# Patient Record
Sex: MALE | NOT HISPANIC OR LATINO | URBAN - METROPOLITAN AREA
[De-identification: names, ages, dates, MRNs, and addresses within clinical notes are randomized per-mention and may not be internally consistent; named-entity substitution may affect disease eponyms.]

---

## 2017-12-18 ENCOUNTER — INPATIENT (INPATIENT)
Facility: HOSPITAL | Age: 82
LOS: 3 days | Discharge: NURSING, REHAB OR LONG-TERM CARE | End: 2017-12-22
Attending: INTERNAL MEDICINE | Admitting: INTERNAL MEDICINE
Payer: MEDICARE

## 2017-12-18 VITALS
HEART RATE: 146 BPM | WEIGHT: 184.97 LBS | OXYGEN SATURATION: 95 % | SYSTOLIC BLOOD PRESSURE: 149 MMHG | HEIGHT: 70 IN | DIASTOLIC BLOOD PRESSURE: 75 MMHG | RESPIRATION RATE: 20 BRPM | TEMPERATURE: 99 F

## 2017-12-18 DIAGNOSIS — J15.9 UNSPECIFIED BACTERIAL PNEUMONIA: ICD-10-CM

## 2017-12-18 DIAGNOSIS — J18.9 PNEUMONIA, UNSPECIFIED ORGANISM: ICD-10-CM

## 2017-12-18 DIAGNOSIS — I10 ESSENTIAL (PRIMARY) HYPERTENSION: ICD-10-CM

## 2017-12-18 DIAGNOSIS — I48.0 PAROXYSMAL ATRIAL FIBRILLATION: ICD-10-CM

## 2017-12-18 DIAGNOSIS — E78.00 PURE HYPERCHOLESTEROLEMIA, UNSPECIFIED: ICD-10-CM

## 2017-12-18 DIAGNOSIS — Z98.41 CATARACT EXTRACTION STATUS, RIGHT EYE: Chronic | ICD-10-CM

## 2017-12-18 DIAGNOSIS — A41.9 SEPSIS, UNSPECIFIED ORGANISM: ICD-10-CM

## 2017-12-18 LAB
ALBUMIN SERPL ELPH-MCNC: 3 G/DL — LOW (ref 3.3–5)
ALP SERPL-CCNC: 90 U/L — SIGNIFICANT CHANGE UP (ref 40–120)
ALT FLD-CCNC: 16 U/L — SIGNIFICANT CHANGE UP (ref 12–78)
ANION GAP SERPL CALC-SCNC: 11 MMOL/L — SIGNIFICANT CHANGE UP (ref 5–17)
ANISOCYTOSIS BLD QL: SLIGHT — SIGNIFICANT CHANGE UP
APPEARANCE UR: CLEAR — SIGNIFICANT CHANGE UP
AST SERPL-CCNC: 21 U/L — SIGNIFICANT CHANGE UP (ref 15–37)
BACTERIA # UR AUTO: ABNORMAL
BILIRUB SERPL-MCNC: 3.2 MG/DL — HIGH (ref 0.2–1.2)
BILIRUB UR-MCNC: NEGATIVE — SIGNIFICANT CHANGE UP
BUN SERPL-MCNC: 19 MG/DL — SIGNIFICANT CHANGE UP (ref 7–23)
CALCIUM SERPL-MCNC: 8.4 MG/DL — LOW (ref 8.5–10.1)
CHLORIDE SERPL-SCNC: 97 MMOL/L — SIGNIFICANT CHANGE UP (ref 96–108)
CK MB BLD-MCNC: 2.7 % — SIGNIFICANT CHANGE UP (ref 0–3.5)
CK MB CFR SERPL CALC: 0.9 NG/ML — SIGNIFICANT CHANGE UP (ref 0.5–3.6)
CK SERPL-CCNC: 33 U/L — SIGNIFICANT CHANGE UP (ref 26–308)
CO2 SERPL-SCNC: 27 MMOL/L — SIGNIFICANT CHANGE UP (ref 22–31)
COLOR SPEC: YELLOW — SIGNIFICANT CHANGE UP
CREAT SERPL-MCNC: 1.33 MG/DL — HIGH (ref 0.5–1.3)
DIFF PNL FLD: ABNORMAL
FLUAV SPEC QL CULT: NEGATIVE — SIGNIFICANT CHANGE UP
FLUBV AG SPEC QL IA: NEGATIVE — SIGNIFICANT CHANGE UP
GIANT PLATELETS BLD QL SMEAR: PRESENT — SIGNIFICANT CHANGE UP
GLUCOSE SERPL-MCNC: 136 MG/DL — HIGH (ref 70–99)
GLUCOSE UR QL: NEGATIVE MG/DL — SIGNIFICANT CHANGE UP
HCT VFR BLD CALC: 46.7 % — SIGNIFICANT CHANGE UP (ref 39–50)
HGB BLD-MCNC: 15.2 G/DL — SIGNIFICANT CHANGE UP (ref 13–17)
HYALINE CASTS # UR AUTO: ABNORMAL /LPF
KETONES UR-MCNC: NEGATIVE — SIGNIFICANT CHANGE UP
LACTATE SERPL-SCNC: 1.8 MMOL/L — SIGNIFICANT CHANGE UP (ref 0.7–2)
LACTATE SERPL-SCNC: 2.9 MMOL/L — HIGH (ref 0.7–2)
LEUKOCYTE ESTERASE UR-ACNC: NEGATIVE — SIGNIFICANT CHANGE UP
LG PLATELETS BLD QL AUTO: SLIGHT — SIGNIFICANT CHANGE UP
MACROCYTES BLD QL: SLIGHT — SIGNIFICANT CHANGE UP
MCHC RBC-ENTMCNC: 30.5 PG — SIGNIFICANT CHANGE UP (ref 27–34)
MCHC RBC-ENTMCNC: 32.6 GM/DL — SIGNIFICANT CHANGE UP (ref 32–36)
MCV RBC AUTO: 93.6 FL — SIGNIFICANT CHANGE UP (ref 80–100)
MONOCYTES NFR BLD AUTO: 9 % — SIGNIFICANT CHANGE UP (ref 2–14)
NEUTROPHILS NFR BLD AUTO: 78 % — HIGH (ref 43–77)
NEUTS BAND # BLD: 1 % — SIGNIFICANT CHANGE UP (ref 0–8)
NITRITE UR-MCNC: NEGATIVE — SIGNIFICANT CHANGE UP
NT-PROBNP SERPL-SCNC: 2988 PG/ML — HIGH (ref 0–450)
PH UR: 6 — SIGNIFICANT CHANGE UP (ref 5–8)
PLAT MORPH BLD: NORMAL — SIGNIFICANT CHANGE UP
PLATELET # BLD AUTO: 263 K/UL — SIGNIFICANT CHANGE UP (ref 150–400)
POLYCHROMASIA BLD QL SMEAR: SLIGHT — SIGNIFICANT CHANGE UP
POTASSIUM SERPL-MCNC: 3.4 MMOL/L — LOW (ref 3.5–5.3)
POTASSIUM SERPL-SCNC: 3.4 MMOL/L — LOW (ref 3.5–5.3)
PROT SERPL-MCNC: 7.5 GM/DL — SIGNIFICANT CHANGE UP (ref 6–8.3)
PROT UR-MCNC: 15 MG/DL
RBC # BLD: 5 M/UL — SIGNIFICANT CHANGE UP (ref 4.2–5.8)
RBC # FLD: 12.1 % — SIGNIFICANT CHANGE UP (ref 11–15)
RBC BLD AUTO: ABNORMAL
RBC CASTS # UR COMP ASSIST: SIGNIFICANT CHANGE UP /HPF (ref 0–4)
SODIUM SERPL-SCNC: 135 MMOL/L — SIGNIFICANT CHANGE UP (ref 135–145)
SP GR SPEC: 1.01 — SIGNIFICANT CHANGE UP (ref 1.01–1.02)
TOXIC GRANULES BLD QL SMEAR: PRESENT — SIGNIFICANT CHANGE UP
TROPONIN I SERPL-MCNC: <.015 NG/ML — SIGNIFICANT CHANGE UP (ref 0.01–0.04)
UROBILINOGEN FLD QL: 1 MG/DL
VARIANT LYMPHS # BLD: 12 % — HIGH (ref 0–6)
WBC # BLD: 30.8 K/UL — HIGH (ref 3.8–10.5)
WBC # FLD AUTO: 30.8 K/UL — HIGH (ref 3.8–10.5)

## 2017-12-18 PROCEDURE — 99291 CRITICAL CARE FIRST HOUR: CPT

## 2017-12-18 PROCEDURE — 99223 1ST HOSP IP/OBS HIGH 75: CPT

## 2017-12-18 PROCEDURE — 93010 ELECTROCARDIOGRAM REPORT: CPT

## 2017-12-18 PROCEDURE — 71010: CPT | Mod: 26

## 2017-12-18 RX ORDER — SODIUM CHLORIDE 9 MG/ML
1000 INJECTION INTRAMUSCULAR; INTRAVENOUS; SUBCUTANEOUS ONCE
Qty: 0 | Refills: 0 | Status: COMPLETED | OUTPATIENT
Start: 2017-12-18 | End: 2017-12-18

## 2017-12-18 RX ORDER — PIPERACILLIN AND TAZOBACTAM 4; .5 G/20ML; G/20ML
3.38 INJECTION, POWDER, LYOPHILIZED, FOR SOLUTION INTRAVENOUS ONCE
Qty: 0 | Refills: 0 | Status: DISCONTINUED | OUTPATIENT
Start: 2017-12-18 | End: 2017-12-18

## 2017-12-18 RX ORDER — SODIUM CHLORIDE 9 MG/ML
3 INJECTION INTRAMUSCULAR; INTRAVENOUS; SUBCUTANEOUS ONCE
Qty: 0 | Refills: 0 | Status: COMPLETED | OUTPATIENT
Start: 2017-12-18 | End: 2017-12-18

## 2017-12-18 RX ORDER — ATORVASTATIN CALCIUM 80 MG/1
10 TABLET, FILM COATED ORAL AT BEDTIME
Qty: 0 | Refills: 0 | Status: DISCONTINUED | OUTPATIENT
Start: 2017-12-18 | End: 2017-12-22

## 2017-12-18 RX ORDER — VANCOMYCIN HCL 1 G
1000 VIAL (EA) INTRAVENOUS ONCE
Qty: 0 | Refills: 0 | Status: COMPLETED | OUTPATIENT
Start: 2017-12-18 | End: 2017-12-18

## 2017-12-18 RX ORDER — POTASSIUM CHLORIDE 20 MEQ
40 PACKET (EA) ORAL ONCE
Qty: 0 | Refills: 0 | Status: COMPLETED | OUTPATIENT
Start: 2017-12-18 | End: 2017-12-18

## 2017-12-18 RX ORDER — ALLOPURINOL 300 MG
100 TABLET ORAL
Qty: 0 | Refills: 0 | Status: DISCONTINUED | OUTPATIENT
Start: 2017-12-18 | End: 2017-12-22

## 2017-12-18 RX ORDER — OFLOXACIN 0.3 %
1 DROPS OPHTHALMIC (EYE) ONCE
Qty: 0 | Refills: 0 | Status: COMPLETED | OUTPATIENT
Start: 2017-12-18 | End: 2017-12-18

## 2017-12-18 RX ORDER — ACETAMINOPHEN 500 MG
650 TABLET ORAL EVERY 6 HOURS
Qty: 0 | Refills: 0 | Status: DISCONTINUED | OUTPATIENT
Start: 2017-12-18 | End: 2017-12-22

## 2017-12-18 RX ORDER — SODIUM CHLORIDE 9 MG/ML
2000 INJECTION INTRAMUSCULAR; INTRAVENOUS; SUBCUTANEOUS ONCE
Qty: 0 | Refills: 0 | Status: COMPLETED | OUTPATIENT
Start: 2017-12-18 | End: 2017-12-18

## 2017-12-18 RX ORDER — PIPERACILLIN AND TAZOBACTAM 4; .5 G/20ML; G/20ML
3.38 INJECTION, POWDER, LYOPHILIZED, FOR SOLUTION INTRAVENOUS EVERY 8 HOURS
Qty: 0 | Refills: 0 | Status: DISCONTINUED | OUTPATIENT
Start: 2017-12-18 | End: 2017-12-21

## 2017-12-18 RX ORDER — ACETAMINOPHEN 500 MG
975 TABLET ORAL ONCE
Qty: 0 | Refills: 0 | Status: COMPLETED | OUTPATIENT
Start: 2017-12-18 | End: 2017-12-18

## 2017-12-18 RX ORDER — OFLOXACIN 0.3 %
1 DROPS OPHTHALMIC (EYE) EVERY 4 HOURS
Qty: 0 | Refills: 0 | Status: COMPLETED | OUTPATIENT
Start: 2017-12-18 | End: 2017-12-19

## 2017-12-18 RX ORDER — PIPERACILLIN AND TAZOBACTAM 4; .5 G/20ML; G/20ML
3.38 INJECTION, POWDER, LYOPHILIZED, FOR SOLUTION INTRAVENOUS ONCE
Qty: 0 | Refills: 0 | Status: COMPLETED | OUTPATIENT
Start: 2017-12-18 | End: 2017-12-18

## 2017-12-18 RX ORDER — ENOXAPARIN SODIUM 100 MG/ML
30 INJECTION SUBCUTANEOUS EVERY 24 HOURS
Qty: 0 | Refills: 0 | Status: DISCONTINUED | OUTPATIENT
Start: 2017-12-18 | End: 2017-12-22

## 2017-12-18 RX ORDER — ATENOLOL 25 MG/1
50 TABLET ORAL DAILY
Qty: 0 | Refills: 0 | Status: DISCONTINUED | OUTPATIENT
Start: 2017-12-18 | End: 2017-12-22

## 2017-12-18 RX ADMIN — ENOXAPARIN SODIUM 30 MILLIGRAM(S): 100 INJECTION SUBCUTANEOUS at 17:25

## 2017-12-18 RX ADMIN — PIPERACILLIN AND TAZOBACTAM 100 GRAM(S): 4; .5 INJECTION, POWDER, LYOPHILIZED, FOR SOLUTION INTRAVENOUS at 08:11

## 2017-12-18 RX ADMIN — SODIUM CHLORIDE 500 MILLILITER(S): 9 INJECTION INTRAMUSCULAR; INTRAVENOUS; SUBCUTANEOUS at 09:07

## 2017-12-18 RX ADMIN — Medication 100 MILLIGRAM(S): at 17:33

## 2017-12-18 RX ADMIN — SODIUM CHLORIDE 3 MILLILITER(S): 9 INJECTION INTRAMUSCULAR; INTRAVENOUS; SUBCUTANEOUS at 08:58

## 2017-12-18 RX ADMIN — Medication 1 DROP(S): at 14:53

## 2017-12-18 RX ADMIN — ATORVASTATIN CALCIUM 10 MILLIGRAM(S): 80 TABLET, FILM COATED ORAL at 21:53

## 2017-12-18 RX ADMIN — Medication 1 DROP(S): at 21:54

## 2017-12-18 RX ADMIN — Medication 40 MILLIEQUIVALENT(S): at 10:07

## 2017-12-18 RX ADMIN — PIPERACILLIN AND TAZOBACTAM 12.5 GRAM(S): 4; .5 INJECTION, POWDER, LYOPHILIZED, FOR SOLUTION INTRAVENOUS at 14:53

## 2017-12-18 RX ADMIN — Medication 975 MILLIGRAM(S): at 08:11

## 2017-12-18 RX ADMIN — Medication 250 MILLIGRAM(S): at 08:42

## 2017-12-18 RX ADMIN — Medication 1 DROP(S): at 09:48

## 2017-12-18 RX ADMIN — Medication 1 DROP(S): at 09:05

## 2017-12-18 RX ADMIN — SODIUM CHLORIDE 2000 MILLILITER(S): 9 INJECTION INTRAMUSCULAR; INTRAVENOUS; SUBCUTANEOUS at 08:02

## 2017-12-18 RX ADMIN — PIPERACILLIN AND TAZOBACTAM 12.5 GRAM(S): 4; .5 INJECTION, POWDER, LYOPHILIZED, FOR SOLUTION INTRAVENOUS at 21:53

## 2017-12-18 RX ADMIN — Medication 1 DROP(S): at 17:26

## 2017-12-18 NOTE — H&P ADULT - NSHPLABSRESULTS_GEN_ALL_CORE
15.2   30.8  )-----------( 263      ( 18 Dec 2017 08:07 )             46.7   12-18    135  |  97  |  19  ----------------------------<  136<H>  3.4<L>   |  27  |  1.33<H>    Ca    8.4<L>      18 Dec 2017 08:07    TPro  7.5  /  Alb  3.0<L>  /  TBili  3.2<H>  /  DBili  x   /  AST  21  /  ALT  16  /  AlkPhos  90  12-18  < from: Xray Chest 1 View AP/PA. (12.18.17 @ 08:32) >    IMPRESSION:  Multifocal pneumonia in the right lung.    ekg- atrial fib?

## 2017-12-18 NOTE — ED PROVIDER NOTE - MEDICAL DECISION MAKING DETAILS
pt with sepsis and PNA R middle/lower lobe noted to admit for further care with Dr. Romero, Otherwise placed on isolation precautions due to rapidity of symptoms development. Placed DNR/DNI as per conversation with son who is surrogate/healthcare agent

## 2017-12-18 NOTE — H&P ADULT - NSHPREVIEWOFSYSTEMS_GEN_ALL_CORE
CONSTITUTIONAL fever, no weight loss, POSITIVE  fatigue  EYES: No eye pain, visual disturbances, or discharge  ENMT:  No difficulty hearing, tinnitus, vertigo; No sinus or throat pain  NECK: No pain or stiffness  RESPIRATORY:pos cough, no wheezing, chills or hemoptysis; mild shortness of breath  CARDIOVASCULAR: No chest pain, palpitations, dizziness, or leg swelling  GASTROINTESTINAL: No abdominal or epigastric pain. No nausea, vomiting, or hematemesis; No diarrhea or constipation. No melena or hematochezia.  GENITOURINARY: No dysuria, frequency, hematuria, or incontinence  NEUROLOGICAL: No headaches, memory loss, loss of strength, numbness, or tremors  SKIN: No itching, burning, rashes, or lesions   LYMPH NODES: No enlarged glands  ENDOCRINE: No heat or cold intolerance; No hair loss  MUSCULOSKELETAL: No joint pain or swelling; No muscle, back, or extremity pain  PSYCHIATRIC: No depression, anxiety, mood swings, or difficulty sleeping  HEME/LYMPH: No easy bruising, or bleeding gums  ALLERGY AND IMMUNOLOGIC: No hives or eczema

## 2017-12-18 NOTE — CONSULT NOTE ADULT - SUBJECTIVE AND OBJECTIVE BOX
[facility], Division of Infectious Diseases  BROOKS Bernardo A. Lee  [phone]  BENJIE CANDELARIO  89y, Male  [id number]    HPI:pt hard of hearing.  89 year old male with PMH of HTN, cataract Sx on R eye, otherwise presenting due to cough and SOB noted for past 2 days. Noted no fever at triage but after receiving patient to main ER noted rectal temp of 101,    States woke up and could not breath.  Has had some rhinorrhea recently, no sore throat, no dysuria, no diarrhea, no chest pain.  Denies sick contact.      PMH/PSH--  Elevated cholesterol  HTN (hypertension)  History of cataract surgery, right      Allergies--aspirin      Medications--  Antibiotics: piperacillin/tazobactam IVPB. 3.375 Gram(s) IV Intermittent every 8 hours    Immunologic:   Other: acetaminophen   Tablet PRN  allopurinol  ATENolol  Tablet  atorvastatin  enoxaparin Injectable  ofloxacin 0.3% Solution      Social History--  EtOH: denies ***  Tobacco: 70 years ago  Drug Use: denies ***  lives alone    Family/Marital History--    NC    Remainder not relevant to clinical concern.    Travel/Environmental/Occupational History:  retired  Review of Systems:  A >=10-point review of systems was obtained.     Pertinent positives and negatives--  Constitutional: No fevers. No Chills. No Rigors.   Eyes: ++ blurry vision + hard of hearing  ENMT: no sore throat  Cardiovascular: No chest pain. No palpitations.  Respiratory: ++ shortness of breath. ++ cough.  Gastrointestinal: No nausea or vomiting. No diarrhea or constipation.   Genitourinary: no dysuria  Musculoskeletal: no joint pain  Skin: no rash  Neurologic: no headache  Psychiatric: denies    Review of systems otherwise negative except as previously noted.    Physical Exam--  Vital Signs: T(F): 98.5 (12-18-17 @ 11:45), Max: 101.3 (12-18-17 @ 08:07)  HR: 89 (12-18-17 @ 11:45)  BP: 147/57 (12-18-17 @ 11:45)  RR: 21 (12-18-17 @ 11:45)  SpO2: 98% (12-18-17 @ 11:45)  Wt(kg): --  General: Nontoxic-appearing Male in no acute distress.  HEENT: AT/NC. Anicteric. Conjunctiva pink and moist. Oropharynx clear. teeth missin  Neck: Not rigid. No sense of mass.  Nodes: None palpable.  Lungs: Clear bilaterally without rales, wheezing or rhonchi  Heart: s1s2 rrr  Abdomen: Bowel sounds present and normoactive. Soft. Nondistended. Nontender  Back: No spinal tenderness. No costovertebral angle tenderness.   Extremities: No cyanosis or clubbing. trace edema.   Skin: Warm. Dry. Good turgor. No rash. No vasculitic stigmata.  Psychiatric: Appropriate affect and mood for situation.         Laboratory & Imaging Data--  CBC                        15.2   30.8  )-----------( 263      ( 18 Dec 2017 08:07 )             46.7       Chemistries  12-18    135  |  97  |  19  ----------------------------<  136<H>  3.4<L>   |  27  |  1.33<H>    Ca    8.4<L>      18 Dec 2017 08:07    TPro  7.5  /  Alb  3.0<L>  /  TBili  3.2<H>  /  DBili  x   /  AST  21  /  ALT  16  /  AlkPhos  90  12-18      Culture Data  Influenza Virus A and B Rapid Antigen (12.18.17 @ 08:20)    Influenza A Rapid Screen: Negative: Interpretation of Influenza virus Type A - Antigen Enzyme Immunoassay:  This test screens for Influenza A.  A negative result does not eliminate  the possibility of infection with influenza A.  Depending upon the type  and adequacy of the specimen collected, the specificity of the assay  ranges from % and the sensitivity from 20-70%.    Influenza B Rapid Screen: Negative: Interpretation of Influenza Virus Type B - Antigen Enzyme Immunoassay:  This test screens for Influenza B.  A negative result does not eliminate  the possibility of infection with influenza B. Depending upon the type  and adequacy of the specimen collected, the specificty of the assay  ranges from % and the sensitivity from 20-70%.        < from: Xray Chest 1 View AP/PA. (12.18.17 @ 08:32) >    EXAM:  CHEST SINGLE VIEW                            PROCEDURE DATE:  12/18/2017          INTERPRETATION:  cough    A frontal chest film demonstrates an infiltrate in the right upper lobe   in the anterior segment. There is also airspace disease in the right   lower lobe.    The heart size and vascular markings are within normal limits for   technique.      No mediastinal or hilar adenopathy is suggested. .     The osseous structures appear intact intact.     IMPRESSION:  Multifocal pneumonia inthe right lung.      < end of copied text >

## 2017-12-18 NOTE — ED PROVIDER NOTE - OBJECTIVE STATEMENT
89 year old male with PMH of HTN, cataract Sx on R eye, otherwise presenting due to cough and SOB noted for past 2 days. Noted no fever at triage but after receiving patient to main ER noted rectal temp of 101, sepsis protocol started on care of patient immediately. Pt otherwise states has not seen a doctor for a long time. Denies chest pain. Otherwise states no sick contacts and usually in good health, no recent hospitalizations.

## 2017-12-18 NOTE — ED PROVIDER NOTE - EYES, MLM
Left eye with conjunctival injection and crusting around eye, pupils equal, round and reactive to light.

## 2017-12-18 NOTE — ED PROVIDER NOTE - CRITICAL CARE PROVIDED
direct patient care (not related to procedure)/documentation/consultation with other physicians/interpretation of diagnostic studies/consult w/ pt's family directly relating to pts condition

## 2017-12-18 NOTE — H&P ADULT - NSHPPHYSICALEXAM_GEN_ALL_CORE
GENERAL: NAD well-developed  HEAD:  Atraumatic, Normocephalic  EYES: EOMI, PERRLA, conjunctiva and sclera clear  ENMT: No tonsillar erythema, exudates, or enlargement; Moist mucous membranes, Good dentition, No lesions  NECK: Supple, No JVD, Normal thyroid  NERVOUS SYSTEM:  Alert & Oriented X3, Good concentration; Motor Strength 5/5 B/L upper and lower extremities; DTRs 2+ intact and symmetric  CHEST/LUNG:  rales at base no rhonchi, wheezing, or rubs  HEART: Regular rate and rhythm; No murmurs, rubs, or gallops  ABDOMEN: Soft, Nontender, Nondistended; Bowel sounds present  EXTREMITIES:  2+ Peripheral Pulses, No clubbing, cyanosis, or edema  LYMPH: No lymphadenopathy   SKIN: No rashes or lesions

## 2017-12-18 NOTE — CONSULT NOTE ADULT - PROBLEM SELECTOR RECOMMENDATION 9
f/u blood cx  rvp concern for bacterial superimposed on viral pna  urine for legionella  not recently hospitalized   maybe concern for aspiration in this 89 yr old  given vancomycin times 1,   change zosyn to unasyn  add azithromycin until legionella is back  follow cbc and fever curve

## 2017-12-19 DIAGNOSIS — Z29.9 ENCOUNTER FOR PROPHYLACTIC MEASURES, UNSPECIFIED: ICD-10-CM

## 2017-12-19 DIAGNOSIS — R53.1 WEAKNESS: ICD-10-CM

## 2017-12-19 LAB
ANION GAP SERPL CALC-SCNC: 8 MMOL/L — SIGNIFICANT CHANGE UP (ref 5–17)
BUN SERPL-MCNC: 19 MG/DL — SIGNIFICANT CHANGE UP (ref 7–23)
CALCIUM SERPL-MCNC: 7.7 MG/DL — LOW (ref 8.5–10.1)
CHLORIDE SERPL-SCNC: 103 MMOL/L — SIGNIFICANT CHANGE UP (ref 96–108)
CO2 SERPL-SCNC: 27 MMOL/L — SIGNIFICANT CHANGE UP (ref 22–31)
CREAT SERPL-MCNC: 0.93 MG/DL — SIGNIFICANT CHANGE UP (ref 0.5–1.3)
CULTURE RESULTS: NO GROWTH — SIGNIFICANT CHANGE UP
GLUCOSE SERPL-MCNC: 111 MG/DL — HIGH (ref 70–99)
HCT VFR BLD CALC: 36.9 % — LOW (ref 39–50)
HGB BLD-MCNC: 12.6 G/DL — LOW (ref 13–17)
LEGIONELLA AG UR QL: NEGATIVE — SIGNIFICANT CHANGE UP
MCHC RBC-ENTMCNC: 31.8 PG — SIGNIFICANT CHANGE UP (ref 27–34)
MCHC RBC-ENTMCNC: 34 GM/DL — SIGNIFICANT CHANGE UP (ref 32–36)
MCV RBC AUTO: 93.6 FL — SIGNIFICANT CHANGE UP (ref 80–100)
PLATELET # BLD AUTO: 205 K/UL — SIGNIFICANT CHANGE UP (ref 150–400)
POTASSIUM SERPL-MCNC: 3.8 MMOL/L — SIGNIFICANT CHANGE UP (ref 3.5–5.3)
POTASSIUM SERPL-SCNC: 3.8 MMOL/L — SIGNIFICANT CHANGE UP (ref 3.5–5.3)
RBC # BLD: 3.94 M/UL — LOW (ref 4.2–5.8)
RBC # FLD: 12.5 % — SIGNIFICANT CHANGE UP (ref 11–15)
SODIUM SERPL-SCNC: 138 MMOL/L — SIGNIFICANT CHANGE UP (ref 135–145)
SPECIMEN SOURCE: SIGNIFICANT CHANGE UP
WBC # BLD: 22.3 K/UL — HIGH (ref 3.8–10.5)
WBC # FLD AUTO: 22.3 K/UL — HIGH (ref 3.8–10.5)

## 2017-12-19 PROCEDURE — 99233 SBSQ HOSP IP/OBS HIGH 50: CPT

## 2017-12-19 RX ORDER — IPRATROPIUM/ALBUTEROL SULFATE 18-103MCG
3 AEROSOL WITH ADAPTER (GRAM) INHALATION EVERY 6 HOURS
Qty: 0 | Refills: 0 | Status: DISCONTINUED | OUTPATIENT
Start: 2017-12-19 | End: 2017-12-22

## 2017-12-19 RX ORDER — ASPIRIN/CALCIUM CARB/MAGNESIUM 324 MG
325 TABLET ORAL DAILY
Qty: 0 | Refills: 0 | Status: DISCONTINUED | OUTPATIENT
Start: 2017-12-19 | End: 2017-12-22

## 2017-12-19 RX ORDER — INFLUENZA VIRUS VACCINE 15; 15; 15; 15 UG/.5ML; UG/.5ML; UG/.5ML; UG/.5ML
0.5 SUSPENSION INTRAMUSCULAR ONCE
Qty: 0 | Refills: 0 | Status: DISCONTINUED | OUTPATIENT
Start: 2017-12-19 | End: 2017-12-22

## 2017-12-19 RX ORDER — IPRATROPIUM/ALBUTEROL SULFATE 18-103MCG
3 AEROSOL WITH ADAPTER (GRAM) INHALATION ONCE
Qty: 0 | Refills: 0 | Status: COMPLETED | OUTPATIENT
Start: 2017-12-19 | End: 2017-12-19

## 2017-12-19 RX ADMIN — Medication 1 DROP(S): at 10:20

## 2017-12-19 RX ADMIN — Medication 100 MILLIGRAM(S): at 17:54

## 2017-12-19 RX ADMIN — Medication 1 DROP(S): at 13:58

## 2017-12-19 RX ADMIN — Medication 1 DROP(S): at 02:17

## 2017-12-19 RX ADMIN — Medication 100 MILLIGRAM(S): at 10:20

## 2017-12-19 RX ADMIN — Medication 3 MILLILITER(S): at 17:05

## 2017-12-19 RX ADMIN — ATORVASTATIN CALCIUM 10 MILLIGRAM(S): 80 TABLET, FILM COATED ORAL at 21:22

## 2017-12-19 RX ADMIN — ENOXAPARIN SODIUM 30 MILLIGRAM(S): 100 INJECTION SUBCUTANEOUS at 17:54

## 2017-12-19 RX ADMIN — PIPERACILLIN AND TAZOBACTAM 12.5 GRAM(S): 4; .5 INJECTION, POWDER, LYOPHILIZED, FOR SOLUTION INTRAVENOUS at 05:45

## 2017-12-19 RX ADMIN — Medication 1 DROP(S): at 05:44

## 2017-12-19 RX ADMIN — PIPERACILLIN AND TAZOBACTAM 12.5 GRAM(S): 4; .5 INJECTION, POWDER, LYOPHILIZED, FOR SOLUTION INTRAVENOUS at 13:57

## 2017-12-19 RX ADMIN — Medication 3 MILLILITER(S): at 11:02

## 2017-12-19 RX ADMIN — PIPERACILLIN AND TAZOBACTAM 12.5 GRAM(S): 4; .5 INJECTION, POWDER, LYOPHILIZED, FOR SOLUTION INTRAVENOUS at 21:25

## 2017-12-19 RX ADMIN — Medication 3 MILLILITER(S): at 03:26

## 2017-12-19 RX ADMIN — ATENOLOL 50 MILLIGRAM(S): 25 TABLET ORAL at 05:44

## 2017-12-19 NOTE — PROGRESS NOTE ADULT - PROBLEM SELECTOR PLAN 1
Would continue IV Zosyn, patient seem sot still be significantly compromised by PNA and will require continued inpatient care.  Will follow results of urine legionella but patient improving on current Rx.

## 2017-12-19 NOTE — PROGRESS NOTE ADULT - SUBJECTIVE AND OBJECTIVE BOX
Patient is a 89y old  Male who presents with a chief complaint of     INTERVAL HPI/ OVERNIGHT EVENTS: Pt was seen and examined at bedside today, No significant overnight events, pt admits to feeling better, still has cough, denies CP and SOB     MEDICATIONS  (STANDING):  ALBUTerol/ipratropium for Nebulization 3 milliLiter(s) Nebulizer every 6 hours  allopurinol 100 milliGRAM(s) Oral two times a day after meals  aspirin 325 milliGRAM(s) Oral daily  ATENolol  Tablet 50 milliGRAM(s) Oral daily  atorvastatin 10 milliGRAM(s) Oral at bedtime  enoxaparin Injectable 30 milliGRAM(s) SubCutaneous every 24 hours  influenza   Vaccine 0.5 milliLiter(s) IntraMuscular once  piperacillin/tazobactam IVPB. 3.375 Gram(s) IV Intermittent every 8 hours    MEDICATIONS  (PRN):  acetaminophen   Tablet 650 milliGRAM(s) Oral every 6 hours PRN For Temp greater than 38 C (100.4 F)      Allergies    aspirin (Stomach Upset)    Intolerances        REVIEW OF SYSTEMS:    Unable to examine due to [ ] Altered Mental Status [ ] Advanced Dementia [ ] Expressive Aphasia [ ] Non-verbal patient    CONSTITUTIONAL: No fever, + generalized weakness/Fatigue, No weight loss  EYES: No eye pain, visual disturbances, or discharge  ENMT:  No difficulty hearing, tinnitus, vertigo; No sinus or throat pain  NECK: No pain or stiffness  RESPIRATORY: +cough w/ cream sputum, No shortness of breath,  wheezing, chills or hemoptysis   CARDIOVASCULAR: No chest pain, palpitations, or leg swelling  GASTROINTESTINAL: No abdominal pain. No nausea, vomiting, diarrhea or constipation. No melena or hematochezia.  GENITOURINARY: No dysuria, frequency, hematuria, or incontinence  NEUROLOGICAL: No headaches, Dizziness, memory loss, loss of strength, numbness, or tremors  SKIN: No itching, burning, rashes, or lesions   MUSCULOSKELETAL: No joint pain or swelling; No muscle, back, or extremity pain  PSYCHIATRIC: No depression, anxiety, mood swings, or difficulty sleeping  HEME/LYMPH: No easy bruising, or bleeding gums  ALLERGY AND IMMUNOLOGIC: No hives or eczema    Vital Signs Last 24 Hrs  T(C): 36.9 (19 Dec 2017 17:33), Max: 37 (19 Dec 2017 00:41)  T(F): 98.4 (19 Dec 2017 17:33), Max: 98.6 (19 Dec 2017 00:41)  HR: 75 (19 Dec 2017 17:45) (74 - 111)  BP: 126/75 (19 Dec 2017 17:45) (126/75 - 145/74)  BP(mean): --  RR: 18 (19 Dec 2017 17:33) (18 - 20)  SpO2: 95% (19 Dec 2017 17:45) (95% - 98%)    PHYSICAL EXAM:  GENERAL: NAD, well-groomed, well-developed, heard of hearing  HEAD:  Atraumatic, Normocephalic  EYES: EOMI, PERRLA, conjunctiva and sclera clear  ENMT: Moist mucous membranes  NECK: Supple, No JVD, Normal thyroid  CHEST/LUNG: Clear to Auscultation bilaterally; No rales, rhonchi, wheezing, or rubs  HEART: Regular rate and rhythm; No murmurs, rubs, or gallops  ABDOMEN: Soft, Nontender, Nondistended; Bowel sounds present  EXTREMITIES:  2+ Peripheral Pulses, No clubbing, cyanosis, or edema  LYMPH: No lymphadenopathy noted  SKIN: No rashes or lesions  NERVOUS SYSTEM:  Alert & Oriented X3, Good concentration; Motor Strength 5/5 B/L upper and lower extremities    LABS:                        12.6   22.3  )-----------( 205      ( 19 Dec 2017 06:37 )             36.9     12-19    138  |  103  |  19  ----------------------------<  111<H>  3.8   |  27  |  0.93    Ca    7.7<L>      19 Dec 2017 06:37    TPro  7.5  /  Alb  3.0<L>  /  TBili  3.2<H>  /  DBili  x   /  AST  21  /  ALT  16  /  AlkPhos  90  12-18      Urinalysis Basic - ( 18 Dec 2017 11:26 )    Color: Yellow / Appearance: Clear / S.010 / pH: x  Gluc: x / Ketone: Negative  / Bili: Negative / Urobili: 1 mg/dL   Blood: x / Protein: 15 mg/dL / Nitrite: Negative   Leuk Esterase: Negative / RBC: 0-2 /HPF / WBC x   Sq Epi: x / Non Sq Epi: x / Bacteria: Occasional      CAPILLARY BLOOD GLUCOSE            Culture - Urine (collected 17)  Source: .Urine Clean Catch (Midstream)  Final Report (17):    No growth    Culture - Blood (collected 17)  Source: .Blood Blood  Preliminary Report (17):    No growth to date.    Culture - Blood (collected 17)  Source: .Blood Blood  Preliminary Report (17):    No growth to date.        RADIOLOGY & ADDITIONAL TESTS:    < from: Xray Chest 1 View AP/PA. (17 @ 08:32) >  IMPRESSION:  Multifocal pneumonia inthe right lung.    < end of copied text >          Imaging Personally Reviewed:  [x ] YES  [ ] NO    Consultant(s) Notes Reviewed:  [x ] YES  [ ] NO    Care Discussed with Consultants/Other Providers [ x YES  [ ] NO

## 2017-12-19 NOTE — PHYSICAL THERAPY INITIAL EVALUATION ADULT - PERTINENT HX OF CURRENT PROBLEM, REHAB EVAL
Patient came to ED with increasing dyspnea, related to cough. Chart reviewed and noted – hematology: downtrending but raised WBC, low abut WNL H+H, chemistry: raised glucose (downtrending; no HbA1C), unrinalysis: no evident infection;virology: ruled out influenza or RVP; imaging: Chest XRay showing clear lung fileds s signs of consolidation, effusion, edema, or atelectasis multifocal PNA on right UL. Impression: PNA with impaired balance and strength.

## 2017-12-19 NOTE — PHYSICAL THERAPY INITIAL EVALUATION ADULT - MODIFIED CLINICAL TEST OF SENSORY INTEGRATION IN BALANCE TEST
Barthel Index: Feeding Score _10__, Bathing Score _0__, Grooming Score _5__, Dressing Score _5__, Bowels Score _0__, Bladder Score _5__, Toilet Score _0__, Transfers Score _10__, Mobility Score _0__, Stairs Score _0__,     Total Score __35_

## 2017-12-19 NOTE — PHYSICAL THERAPY INITIAL EVALUATION ADULT - GENERAL OBSERVATIONS, REHAB EVAL
Patient supine in bed c stable vital signs. Cardiac monitor in place. AAOx4. Hard of hearing (usually with hearing aides); visually impaired due to cataracts. Patient denies pain or shortness of breath at rest. Chest Auscultation: crackles and expiratory wheezes on right anterior and posterior chest. Palpation: no tactile fremitus, symmetric expansion. Cough: strong, effective.

## 2017-12-19 NOTE — PROGRESS NOTE ADULT - PROBLEM SELECTOR PLAN 1
Sepsis POA resolved now, Right lung, presumed Gram - bacteria, influenza neg, as seen on CXR, afebrile, ++leukocytosis, ID consult appreciated, will continue with IV broad spectrum abx.

## 2017-12-19 NOTE — PHYSICAL THERAPY INITIAL EVALUATION ADULT - PRECAUTIONS/LIMITATIONS, REHAB EVAL
per EMR has conjunctivitis; per son by bedside later in session, has cataracts; uses hearing aides/cardiac precautions/fall precautions/hearing precautions/vision precautions

## 2017-12-19 NOTE — PHYSICAL THERAPY INITIAL EVALUATION ADULT - IMPAIRMENTS FOUND, PT EVAL
ROM/sensory integrity/poor safety awareness/ventilation and respiration/gas exchange/neuromotor development and sensory integration/visual motor/gait, locomotion, and balance/integumentary integrity/joint integrity and mobility/muscle strength/aerobic capacity/endurance/cranial and peripheral nerve integrity

## 2017-12-19 NOTE — PROGRESS NOTE ADULT - SUBJECTIVE AND OBJECTIVE BOX
infectious diseases progress note:    BENJIE CANDELARIO is a 89y y. o. Male patient    Patient reports: breathing attack this morning    ROS:    EYES:  Negative  blurry vision or double vision  GASTROINTESTINAL:  Negative for nausea, vomiting, diarrhea  -otherwise negative except for subjective    Allergies    aspirin (Stomach Upset)    Intolerances        ANTIBIOTICS/RELEVANT:  antimicrobials  piperacillin/tazobactam IVPB. 3.375 Gram(s) IV Intermittent every 8 hours    immunologic:    OTHER:  acetaminophen   Tablet 650 milliGRAM(s) Oral every 6 hours PRN  ALBUTerol/ipratropium for Nebulization 3 milliLiter(s) Nebulizer every 6 hours  ALBUTerol/ipratropium for Nebulization. 3 milliLiter(s) Nebulizer once  allopurinol 100 milliGRAM(s) Oral two times a day after meals  ATENolol  Tablet 50 milliGRAM(s) Oral daily  atorvastatin 10 milliGRAM(s) Oral at bedtime  enoxaparin Injectable 30 milliGRAM(s) SubCutaneous every 24 hours  ofloxacin 0.3% Solution 1 Drop(s) Left EYE every 4 hours      Objective:  Last 24-Vital Signs Last 24 Hrs  T(C): 37 (19 Dec 2017 00:41), Max: 37 (19 Dec 2017 00:41)  T(F): 98.6 (19 Dec 2017 00:41), Max: 98.6 (19 Dec 2017 00:41)  HR: 111 (19 Dec 2017 03:29) (86 - 116)  BP: 136/64 (19 Dec 2017 00:41) (136/64 - 147/74)  BP(mean): --  RR: 20 (19 Dec 2017 00:41) (18 - 21)  SpO2: 97% (19 Dec 2017 03:29) (96% - 98%)    T(C): 37 (17 @ 00:41), Max: 38.5 (17 @ 08:07)  T(F): 98.6 (17 @ 00:41), Max: 101.3 (17 @ 08:07)  T(C): 37 (17 @ 00:41), Max: 38.5 (17 @ 08:07)  T(F): 98.6 (17 @ 00:41), Max: 101.3 (17 @ 08:07)  T(C): 37 (17 @ 00:41), Max: 38.5 (17 @ 08:07)  T(F): 98.6 (17 @ 00:41), Max: 101.3 (17 @ 08:07)    PHYSICAL EXAM:  Constitutional:Well-developed, well nourished  Eyes:PERRLA, EOMI  Ear/Nose/Throat: oropharynx normal	  Neck:no JVD, no lymphadenopathy, supple  Respiratory: no accessory muscle use, lung fields with significant ronchi and crackles mostly on the right  Cardiovascular:RRR, normal S1, S2 no m/r/g  Gastrointestinal:soft, NT, no HSM, BS-normal  Extremities:no clubbing, no cyanosis, edema absent  Neuro-patient alert, oriented and appropriate  Skin-no sig lesions      LABS:                        12.6   22.3  )-----------( 205      ( 19 Dec 2017 06:37 )             36.9       WBC 22.3   @ 06:37  WBC 30.8   @ 08:07          138  |  103  |  19  ----------------------------<  111<H>  3.8   |  27  |  0.93    Ca    7.7<L>      19 Dec 2017 06:37    TPro  7.5  /  Alb  3.0<L>  /  TBili  3.2<H>  /  DBili  x   /  AST  21  /  ALT  16  /  AlkPhos  90        Urinalysis Basic - ( 18 Dec 2017 11:26 )    Color: Yellow / Appearance: Clear / S.010 / pH: x  Gluc: x / Ketone: Negative  / Bili: Negative / Urobili: 1 mg/dL   Blood: x / Protein: 15 mg/dL / Nitrite: Negative   Leuk Esterase: Negative / RBC: 0-2 /HPF / WBC x   Sq Epi: x / Non Sq Epi: x / Bacteria: Occasional          MICROBIOLOGY:        RADIOLOGY & ADDITIONAL STUDIES:

## 2017-12-19 NOTE — PHYSICAL THERAPY INITIAL EVALUATION ADULT - PLANNED THERAPY INTERVENTIONS, PT EVAL
gait training/postural re-education/strengthening/neuromuscular re-education/ROM/transfer training/balance training/stretching/bed mobility training

## 2017-12-19 NOTE — PHYSICAL THERAPY INITIAL EVALUATION ADULT - CRITERIA FOR SKILLED THERAPEUTIC INTERVENTIONS
functional limitations in following categories/anticipated discharge recommendation/impairments found/risk reduction/prevention/rehab potential

## 2017-12-19 NOTE — PHYSICAL THERAPY INITIAL EVALUATION ADULT - ADDITIONAL COMMENTS
As per patient, lives alone in private house c 3 stair steps to enter c handrails. Only dwells now on house's main level, where living area was converted to his bedroom and bathroom is about 10 feet from bed. Aleks[facility] home health aide. Does not use a cane he has at home to ambulate prior to admission.

## 2017-12-20 LAB
ANION GAP SERPL CALC-SCNC: 9 MMOL/L — SIGNIFICANT CHANGE UP (ref 5–17)
BUN SERPL-MCNC: 20 MG/DL — SIGNIFICANT CHANGE UP (ref 7–23)
CALCIUM SERPL-MCNC: 7.6 MG/DL — LOW (ref 8.5–10.1)
CHLORIDE SERPL-SCNC: 104 MMOL/L — SIGNIFICANT CHANGE UP (ref 96–108)
CO2 SERPL-SCNC: 27 MMOL/L — SIGNIFICANT CHANGE UP (ref 22–31)
CREAT SERPL-MCNC: 0.9 MG/DL — SIGNIFICANT CHANGE UP (ref 0.5–1.3)
GLUCOSE SERPL-MCNC: 91 MG/DL — SIGNIFICANT CHANGE UP (ref 70–99)
HCT VFR BLD CALC: 36.6 % — LOW (ref 39–50)
HGB BLD-MCNC: 12.3 G/DL — LOW (ref 13–17)
MCHC RBC-ENTMCNC: 31.4 PG — SIGNIFICANT CHANGE UP (ref 27–34)
MCHC RBC-ENTMCNC: 33.7 GM/DL — SIGNIFICANT CHANGE UP (ref 32–36)
MCV RBC AUTO: 93.1 FL — SIGNIFICANT CHANGE UP (ref 80–100)
PLATELET # BLD AUTO: 216 K/UL — SIGNIFICANT CHANGE UP (ref 150–400)
POTASSIUM SERPL-MCNC: 3.6 MMOL/L — SIGNIFICANT CHANGE UP (ref 3.5–5.3)
POTASSIUM SERPL-SCNC: 3.6 MMOL/L — SIGNIFICANT CHANGE UP (ref 3.5–5.3)
RBC # BLD: 3.93 M/UL — LOW (ref 4.2–5.8)
RBC # FLD: 11.9 % — SIGNIFICANT CHANGE UP (ref 11–15)
SODIUM SERPL-SCNC: 140 MMOL/L — SIGNIFICANT CHANGE UP (ref 135–145)
TSH SERPL-MCNC: 1.05 UU/ML — SIGNIFICANT CHANGE UP (ref 0.36–3.74)
WBC # BLD: 17.9 K/UL — HIGH (ref 3.8–10.5)
WBC # FLD AUTO: 17.9 K/UL — HIGH (ref 3.8–10.5)

## 2017-12-20 PROCEDURE — 93010 ELECTROCARDIOGRAM REPORT: CPT

## 2017-12-20 PROCEDURE — 99233 SBSQ HOSP IP/OBS HIGH 50: CPT

## 2017-12-20 RX ADMIN — Medication 3 MILLILITER(S): at 17:11

## 2017-12-20 RX ADMIN — Medication 325 MILLIGRAM(S): at 11:41

## 2017-12-20 RX ADMIN — Medication 3 MILLILITER(S): at 11:21

## 2017-12-20 RX ADMIN — Medication 3 MILLILITER(S): at 23:49

## 2017-12-20 RX ADMIN — Medication 100 MILLIGRAM(S): at 16:38

## 2017-12-20 RX ADMIN — PIPERACILLIN AND TAZOBACTAM 12.5 GRAM(S): 4; .5 INJECTION, POWDER, LYOPHILIZED, FOR SOLUTION INTRAVENOUS at 21:55

## 2017-12-20 RX ADMIN — ATENOLOL 50 MILLIGRAM(S): 25 TABLET ORAL at 05:57

## 2017-12-20 RX ADMIN — Medication 100 MILLIGRAM(S): at 09:15

## 2017-12-20 RX ADMIN — ATORVASTATIN CALCIUM 10 MILLIGRAM(S): 80 TABLET, FILM COATED ORAL at 21:55

## 2017-12-20 RX ADMIN — ENOXAPARIN SODIUM 30 MILLIGRAM(S): 100 INJECTION SUBCUTANEOUS at 16:38

## 2017-12-20 RX ADMIN — PIPERACILLIN AND TAZOBACTAM 12.5 GRAM(S): 4; .5 INJECTION, POWDER, LYOPHILIZED, FOR SOLUTION INTRAVENOUS at 05:57

## 2017-12-20 RX ADMIN — PIPERACILLIN AND TAZOBACTAM 12.5 GRAM(S): 4; .5 INJECTION, POWDER, LYOPHILIZED, FOR SOLUTION INTRAVENOUS at 13:42

## 2017-12-20 NOTE — PROGRESS NOTE ADULT - PROBLEM SELECTOR PLAN 1
Sepsis POA resolved now, Right lung, presumed Gram - bacteria, influenza neg, as seen on CXR, afebrile, improving leukocytosis, ID on board, will continue with IV broad spectrum abx.

## 2017-12-20 NOTE — PROGRESS NOTE ADULT - SUBJECTIVE AND OBJECTIVE BOX
Patient is a 89y old  Male who presents with a chief complaint of     INTERVAL HPI/ OVERNIGHT EVENTS: Pt was seen and examined at bedside today, No significant overnight events, His breathing and cough continues to feel better, pt still feels a little weak     MEDICATIONS  (STANDING):  ALBUTerol/ipratropium for Nebulization 3 milliLiter(s) Nebulizer every 6 hours  allopurinol 100 milliGRAM(s) Oral two times a day after meals  aspirin 325 milliGRAM(s) Oral daily  ATENolol  Tablet 50 milliGRAM(s) Oral daily  atorvastatin 10 milliGRAM(s) Oral at bedtime  enoxaparin Injectable 30 milliGRAM(s) SubCutaneous every 24 hours  influenza   Vaccine 0.5 milliLiter(s) IntraMuscular once  piperacillin/tazobactam IVPB. 3.375 Gram(s) IV Intermittent every 8 hours    MEDICATIONS  (PRN):  acetaminophen   Tablet 650 milliGRAM(s) Oral every 6 hours PRN For Temp greater than 38 C (100.4 F)      Allergies    aspirin (Stomach Upset)    Intolerances        REVIEW OF SYSTEMS:    Unable to examine due to [ ] Altered Mental Status [ ] Advanced Dementia [ ] Expressive Aphasia [ ] Non-verbal patient    CONSTITUTIONAL: No fever, + generalized weakness/Fatigue, No weight loss  EYES: No eye pain, visual disturbances, or discharge  ENMT:  No difficulty hearing, tinnitus, vertigo; No sinus or throat pain  NECK: No pain or stiffness  RESPIRATORY: +cough, No shortness of breath, wheezing, chills or hemoptysis   CARDIOVASCULAR: No chest pain, palpitations, or leg swelling  GASTROINTESTINAL: No abdominal pain. No nausea, vomiting, diarrhea or constipation. No melena or hematochezia.  GENITOURINARY: No dysuria, frequency, hematuria, or incontinence  NEUROLOGICAL: No headaches, Dizziness, memory loss, loss of strength, numbness, or tremors  SKIN: No itching, burning, rashes, or lesions   MUSCULOSKELETAL: right heel pain, No joint pain or swelling; No muscle, back, or extremity pain  PSYCHIATRIC: No depression, anxiety, mood swings, or difficulty sleeping  HEME/LYMPH: No easy bruising, or bleeding gums  ALLERGY AND IMMUNOLOGIC: No hives or eczema    Vital Signs Last 24 Hrs  T(C): 36.9 (20 Dec 2017 18:43), Max: 37 (20 Dec 2017 05:29)  T(F): 98.4 (20 Dec 2017 18:43), Max: 98.6 (20 Dec 2017 05:29)  HR: 96 (20 Dec 2017 18:43) (86 - 103)  BP: 132/67 (20 Dec 2017 18:43) (114/71 - 135/61)  BP(mean): --  RR: 20 (20 Dec 2017 18:43) (20 - 20)  SpO2: 96% (20 Dec 2017 17:33) (93% - 97%)    PHYSICAL EXAM:  GENERAL: NAD, well-groomed, well-developed, heard of hearing  HEAD:  Atraumatic, Normocephalic  EYES: EOMI, PERRLA, conjunctiva and sclera clear  ENMT: Moist mucous membranes  NECK: Supple, No JVD, Normal thyroid  CHEST/LUNG: Clear to Auscultation bilaterally; No rales, rhonchi, wheezing, or rubs  HEART: Regular rate and rhythm; No murmurs, rubs, or gallops  ABDOMEN: Soft, Nontender, Nondistended; Bowel sounds present  EXTREMITIES: trace le edema,  2+ Peripheral Pulses, No clubbing, cyanosis  LYMPH: No lymphadenopathy noted  SKIN: No rashes or lesions  NERVOUS SYSTEM:  Alert & Oriented X3, Good concentration; Motor Strength 5/5 B/L upper and lower extremities    LABS:                        12.3   17.9  )-----------( 216      ( 20 Dec 2017 06:27 )             36.6     12-20    140  |  104  |  20  ----------------------------<  91  3.6   |  27  |  0.90    Ca    7.6<L>      20 Dec 2017 06:27          CAPILLARY BLOOD GLUCOSE            Culture - Urine (collected 12-18-17)  Source: .Urine Clean Catch (Midstream)  Final Report (12-19-17):    No growth    Culture - Blood (collected 12-18-17)  Source: .Blood Blood  Preliminary Report (12-19-17):    No growth to date.    Culture - Blood (collected 12-18-17)  Source: .Blood Blood  Preliminary Report (12-19-17):    No growth to date.        RADIOLOGY & ADDITIONAL TESTS:          Imaging Personally Reviewed:  [ ] YES  [ ] NO    Consultant(s) Notes Reviewed:  [x ] YES  [ ] NO    Care Discussed with Consultants/Other Providers [x ] YES  [ ] NO

## 2017-12-21 DIAGNOSIS — R06.09 OTHER FORMS OF DYSPNEA: ICD-10-CM

## 2017-12-21 PROCEDURE — 99233 SBSQ HOSP IP/OBS HIGH 50: CPT

## 2017-12-21 RX ORDER — AMPICILLIN SODIUM AND SULBACTAM SODIUM 250; 125 MG/ML; MG/ML
1.5 INJECTION, POWDER, FOR SUSPENSION INTRAMUSCULAR; INTRAVENOUS ONCE
Qty: 0 | Refills: 0 | Status: COMPLETED | OUTPATIENT
Start: 2017-12-21 | End: 2017-12-21

## 2017-12-21 RX ORDER — AMPICILLIN SODIUM AND SULBACTAM SODIUM 250; 125 MG/ML; MG/ML
1.5 INJECTION, POWDER, FOR SUSPENSION INTRAMUSCULAR; INTRAVENOUS EVERY 6 HOURS
Qty: 0 | Refills: 0 | Status: DISCONTINUED | OUTPATIENT
Start: 2017-12-22 | End: 2017-12-22

## 2017-12-21 RX ORDER — PIPERACILLIN AND TAZOBACTAM 4; .5 G/20ML; G/20ML
3.38 INJECTION, POWDER, LYOPHILIZED, FOR SOLUTION INTRAVENOUS EVERY 8 HOURS
Qty: 0 | Refills: 0 | Status: DISCONTINUED | OUTPATIENT
Start: 2017-12-21 | End: 2017-12-21

## 2017-12-21 RX ORDER — AMPICILLIN SODIUM AND SULBACTAM SODIUM 250; 125 MG/ML; MG/ML
INJECTION, POWDER, FOR SUSPENSION INTRAMUSCULAR; INTRAVENOUS
Qty: 0 | Refills: 0 | Status: DISCONTINUED | OUTPATIENT
Start: 2017-12-21 | End: 2017-12-22

## 2017-12-21 RX ADMIN — Medication 3 MILLILITER(S): at 17:21

## 2017-12-21 RX ADMIN — Medication 3 MILLILITER(S): at 23:57

## 2017-12-21 RX ADMIN — AMPICILLIN SODIUM AND SULBACTAM SODIUM 100 GRAM(S): 250; 125 INJECTION, POWDER, FOR SUSPENSION INTRAMUSCULAR; INTRAVENOUS at 21:23

## 2017-12-21 RX ADMIN — Medication 1 TABLET(S): at 17:27

## 2017-12-21 RX ADMIN — Medication 3 MILLILITER(S): at 11:40

## 2017-12-21 RX ADMIN — Medication 3 MILLILITER(S): at 05:29

## 2017-12-21 RX ADMIN — PIPERACILLIN AND TAZOBACTAM 12.5 GRAM(S): 4; .5 INJECTION, POWDER, LYOPHILIZED, FOR SOLUTION INTRAVENOUS at 05:37

## 2017-12-21 RX ADMIN — Medication 100 MILLIGRAM(S): at 09:57

## 2017-12-21 RX ADMIN — Medication 100 MILLIGRAM(S): at 18:02

## 2017-12-21 RX ADMIN — ATENOLOL 50 MILLIGRAM(S): 25 TABLET ORAL at 05:37

## 2017-12-21 RX ADMIN — ENOXAPARIN SODIUM 30 MILLIGRAM(S): 100 INJECTION SUBCUTANEOUS at 17:26

## 2017-12-21 RX ADMIN — Medication 325 MILLIGRAM(S): at 12:19

## 2017-12-21 NOTE — PROGRESS NOTE ADULT - PROBLEM SELECTOR PLAN 2
continue home meds Sepsis POA resolved now, Right lung, presumed Gram - bacteria, influenza neg, as seen on CXR, afebrile, improving leukocytosis, ID note appreciated, will f/u chest CT, continue with IV broad spectrum abx.

## 2017-12-21 NOTE — PROGRESS NOTE ADULT - PROBLEM SELECTOR PLAN 1
Sepsis POA resolved now, Right lung, presumed Gram - bacteria, influenza neg, as seen on CXR, afebrile, improving leukocytosis, ID on board, will continue with IV broad spectrum abx. cont O2 supplement, duo-nebs prn, f/u CT chest, pulmonary consult

## 2017-12-21 NOTE — PROGRESS NOTE ADULT - SUBJECTIVE AND OBJECTIVE BOX
Patient is a 89y old  Male who presents with a chief complaint of     INTERVAL HPI/ OVERNIGHT EVENTS: Pt was seen and examined at bedside today, No significant overnight events     MEDICATIONS  (STANDING):  ALBUTerol/ipratropium for Nebulization 3 milliLiter(s) Nebulizer every 6 hours  allopurinol 100 milliGRAM(s) Oral two times a day after meals  amoxicillin  875 milliGRAM(s)/clavulanate 1 Tablet(s) Oral two times a day  aspirin 325 milliGRAM(s) Oral daily  ATENolol  Tablet 50 milliGRAM(s) Oral daily  atorvastatin 10 milliGRAM(s) Oral at bedtime  enoxaparin Injectable 30 milliGRAM(s) SubCutaneous every 24 hours  influenza   Vaccine 0.5 milliLiter(s) IntraMuscular once    MEDICATIONS  (PRN):  acetaminophen   Tablet 650 milliGRAM(s) Oral every 6 hours PRN For Temp greater than 38 C (100.4 F)      Allergies    aspirin (Stomach Upset)    Intolerances        REVIEW OF SYSTEMS:    Unable to examine due to [ ] Altered Mental Status [ ] Advanced Dementia [ ] Expressive Aphasia [ ] Non-verbal patient    CONSTITUTIONAL: No fever, NO generalized weakness/Fatigue, No weight loss  EYES: No eye pain, visual disturbances, or discharge  ENMT:  No difficulty hearing, tinnitus, vertigo; No sinus or throat pain  NECK: No pain or stiffness  RESPIRATORY: No shortness of breath,  cough, wheezing, chills or hemoptysis   CARDIOVASCULAR: No chest pain, palpitations, or leg swelling  GASTROINTESTINAL: No abdominal pain. No nausea, vomiting, diarrhea or constipation. No melena or hematochezia.  GENITOURINARY: No dysuria, frequency, hematuria, or incontinence  NEUROLOGICAL: No headaches, Dizziness, memory loss, loss of strength, numbness, or tremors  SKIN: No itching, burning, rashes, or lesions   MUSCULOSKELETAL: No joint pain or swelling; No muscle, back, or extremity pain  PSYCHIATRIC: No depression, anxiety, mood swings, or difficulty sleeping  HEME/LYMPH: No easy bruising, or bleeding gums  ALLERGY AND IMMUNOLOGIC: No hives or eczema    Vital Signs Last 24 Hrs  T(C): 36.4 (21 Dec 2017 18:50), Max: 37.3 (21 Dec 2017 00:56)  T(F): 97.6 (21 Dec 2017 18:50), Max: 99.2 (21 Dec 2017 00:56)  HR: 78 (21 Dec 2017 18:50) (78 - 107)  BP: 144/69 (21 Dec 2017 18:50) (107/80 - 144/69)  BP(mean): --  RR: 18 (21 Dec 2017 18:50) (16 - 18)  SpO2: 98% (21 Dec 2017 18:50) (95% - 99%)    PHYSICAL EXAM:  GENERAL: NAD, well-groomed, well-developed  HEAD:  Atraumatic, Normocephalic  EYES: EOMI, PERRLA, conjunctiva and sclera clear  ENMT: Moist mucous membranes  NECK: Supple, No JVD, Normal thyroid  CHEST/LUNG: Clear to Auscultation bilaterally; No rales, rhonchi, wheezing, or rubs  HEART: Regular rate and rhythm; No murmurs, rubs, or gallops  ABDOMEN: Soft, Nontender, Nondistended; Bowel sounds present  EXTREMITIES:  2+ Peripheral Pulses, No clubbing, cyanosis, or edema  LYMPH: No lymphadenopathy noted  SKIN: No rashes or lesions  NERVOUS SYSTEM:  Alert & Oriented X3, Good concentration; Motor Strength 5/5 B/L upper and lower extremities    LABS:                        12.3   17.9  )-----------( 216      ( 20 Dec 2017 06:27 )             36.6     12-20    140  |  104  |  20  ----------------------------<  91  3.6   |  27  |  0.90    Ca    7.6<L>      20 Dec 2017 06:27          CAPILLARY BLOOD GLUCOSE            Culture - Urine (collected 12-18-17)  Source: .Urine Clean Catch (Midstream)  Final Report (12-19-17):    No growth    Culture - Blood (collected 12-18-17)  Source: .Blood Blood  Preliminary Report (12-19-17):    No growth to date.    Culture - Blood (collected 12-18-17)  Source: .Blood Blood  Preliminary Report (12-19-17):    No growth to date.        RADIOLOGY & ADDITIONAL TESTS:          Imaging Personally Reviewed:  [ ] YES  [ ] NO    Consultant(s) Notes Reviewed:  [ ] YES  [ ] NO    Care Discussed with Consultants/Other Providers [ ] YES  [ ] NO Patient is a 89y old  Male who presents with a chief complaint of     INTERVAL HPI/ OVERNIGHT EVENTS: Pt was seen and examined at bedside today, No significant overnight events, pt states that he is feeling better, +cough, admits to SOB with exertion, no cp      MEDICATIONS  (STANDING):  ALBUTerol/ipratropium for Nebulization 3 milliLiter(s) Nebulizer every 6 hours  allopurinol 100 milliGRAM(s) Oral two times a day after meals  amoxicillin  875 milliGRAM(s)/clavulanate 1 Tablet(s) Oral two times a day  aspirin 325 milliGRAM(s) Oral daily  ATENolol  Tablet 50 milliGRAM(s) Oral daily  atorvastatin 10 milliGRAM(s) Oral at bedtime  enoxaparin Injectable 30 milliGRAM(s) SubCutaneous every 24 hours  influenza   Vaccine 0.5 milliLiter(s) IntraMuscular once    MEDICATIONS  (PRN):  acetaminophen   Tablet 650 milliGRAM(s) Oral every 6 hours PRN For Temp greater than 38 C (100.4 F)      Allergies    aspirin (Stomach Upset)    Intolerances        REVIEW OF SYSTEMS:    Unable to examine due to [ ] Altered Mental Status [ ] Advanced Dementia [ ] Expressive Aphasia [ ] Non-verbal patient    CONSTITUTIONAL: No fever, + generalized weakness/Fatigue, No weight loss  EYES: No eye pain, visual disturbances, or discharge  ENMT:  No difficulty hearing, tinnitus, vertigo; No sinus or throat pain  NECK: No pain or stiffness  RESPIRATORY: + shortness of breath, + cough, no wheezing, chills or hemoptysis   CARDIOVASCULAR: No chest pain, palpitations, or leg swelling  GASTROINTESTINAL: No abdominal pain. No nausea, vomiting, diarrhea or constipation. No melena or hematochezia.  GENITOURINARY: No dysuria, frequency, hematuria, or incontinence  NEUROLOGICAL: No headaches, Dizziness, memory loss, loss of strength, numbness, or tremors  SKIN: No itching, burning, rashes, or lesions   MUSCULOSKELETAL: No joint pain or swelling; No muscle, back, or extremity pain  PSYCHIATRIC: No depression, anxiety, mood swings, or difficulty sleeping  HEME/LYMPH: No easy bruising, or bleeding gums  ALLERGY AND IMMUNOLOGIC: No hives or eczema    Vital Signs Last 24 Hrs  T(C): 36.4 (21 Dec 2017 18:50), Max: 37.3 (21 Dec 2017 00:56)  T(F): 97.6 (21 Dec 2017 18:50), Max: 99.2 (21 Dec 2017 00:56)  HR: 78 (21 Dec 2017 18:50) (78 - 107)  BP: 144/69 (21 Dec 2017 18:50) (107/80 - 144/69)  BP(mean): --  RR: 18 (21 Dec 2017 18:50) (16 - 18)  SpO2: 98% (21 Dec 2017 18:50) (95% - 99%)    PHYSICAL EXAM:  GENERAL: NAD, but pt looks SOB when speaking, well-groomed, well-developed  HEAD:  Atraumatic, Normocephalic  EYES: EOMI, PERRLA, conjunctiva and sclera clear  ENMT: Moist mucous membranes  NECK: Supple, No JVD, Normal thyroid  CHEST/LUNG: Clear to Auscultation bilaterally; distant breath sounds, No rales, rhonchi, wheezing, or rubs  HEART: Regular rate and rhythm; No murmurs, rubs, or gallops  ABDOMEN: Soft, Nontender, Nondistended; Bowel sounds present  EXTREMITIES:  2+ Peripheral Pulses, No clubbing, cyanosis, or edema  LYMPH: No lymphadenopathy noted  SKIN: No rashes or lesions  NERVOUS SYSTEM:  Alert & Oriented X3, Good concentration; Motor Strength 5/5 B/L upper and lower extremities    LABS:                        12.3   17.9  )-----------( 216      ( 20 Dec 2017 06:27 )             36.6     12-20    140  |  104  |  20  ----------------------------<  91  3.6   |  27  |  0.90    Ca    7.6<L>      20 Dec 2017 06:27          CAPILLARY BLOOD GLUCOSE            Culture - Urine (collected 12-18-17)  Source: .Urine Clean Catch (Midstream)  Final Report (12-19-17):    No growth    Culture - Blood (collected 12-18-17)  Source: .Blood Blood  Preliminary Report (12-19-17):    No growth to date.    Culture - Blood (collected 12-18-17)  Source: .Blood Blood  Preliminary Report (12-19-17):    No growth to date.        RADIOLOGY & ADDITIONAL TESTS:          Imaging Personally Reviewed:  [ ] YES  [ ] NO    Consultant(s) Notes Reviewed:  [x ] YES  [ ] NO    Care Discussed with Consultants/Other Providers [ x] YES  [ ] NO

## 2017-12-21 NOTE — PROGRESS NOTE ADULT - SUBJECTIVE AND OBJECTIVE BOX
[facility], Division of Infectious Diseases  BROOKS Bernardo A. Lee  [phone]  Name: BENJIE CANDELARIO  Age: 89y  Gender: Male  MRN: [id number]    Interval History--  Notes reviewed  less cough today.  smiling      Past Medical History--  Elevated cholesterol  HTN (hypertension)  History of cataract surgery, right      For details regarding the patient's social history, family history, and other miscellaneous elements, please refer the initial infectious diseases consultation and/or the admitting history and physical examination for this admission.    Allergies    aspirin (Stomach Upset)    Intolerances        Medications--  Antibiotics:  amoxicillin  875 milliGRAM(s)/clavulanate 1 Tablet(s) Oral two times a day    Immunologic:  influenza   Vaccine 0.5 milliLiter(s) IntraMuscular once    Other:  acetaminophen   Tablet PRN  ALBUTerol/ipratropium for Nebulization  allopurinol  aspirin  ATENolol  Tablet  atorvastatin  enoxaparin Injectable      Review of Systems--  A 10-point review of systems was obtained.     Pertinent positives and negatives--  Constitutional: No fevers. No Chills. No Rigors.   Cardiovascular: No chest pain. No palpitations.  Respiratory: No shortness of breath. No cough.  Gastrointestinal: No nausea or vomiting. No diarrhea or constipation.   Psychiatric: Pleasant.     Review of systems otherwise negative except as previously noted.    Physical Examination--  Vital Signs: T(F): 98.5 (12-21-17 @ 12:24), Max: 99.2 (12-21-17 @ 00:56)  HR: 80 (12-21-17 @ 12:24)  BP: 138/64 (12-21-17 @ 12:24)  RR: 18 (12-21-17 @ 12:24)  SpO2: 98% (12-21-17 @ 12:24)  Wt(kg): --  General: Nontoxic-appearing Male in no acute distress.  HEENT: AT/NC.  Anicteric. Conjunctiva pink and moist. Oropharynx clear. Dentition fair.  Neck: Not rigid. No sense of mass.  Nodes: None palpable.  Lungs: decreased bs  Heart: Regular rate and rhythm. No Murmur. No rub. No gallop. No palpable thrill.  Abdomen: Bowel sounds present and normoactive. Soft. Nondistended. Nontender.   Extremities: No cyanosis or clubbing. trace edema.   Skin: Warm. Dry. Good turgor. No rash. No vasculitic stigmata.  Psychiatric: Appropriate affect and mood for situation.         Laboratory Studies--  CBC                        12.3   17.9  )-----------( 216      ( 20 Dec 2017 06:27 )             36.6       Chemistries  12-20    140  |  104  |  20  ----------------------------<  91  3.6   |  27  |  0.90    Ca    7.6<L>      20 Dec 2017 06:27        Culture Data    Culture - Urine (12.18.17 @ 15:02)    Specimen Source: .Urine Clean Catch (Midstream)    Culture Results:   No growth        Rapid Respiratory Viral Panel (12.18.17 @ 09:48)    Rapid RVP Result: NotDetec: This Respiratory Panel uses polymerase chain reaction (PCR) to detect for  adenovirus; coronavirus (HKU1, NL63, 229E, OC43); human metapneumovirus  (hMPV); human enterovirus/rhinovirus (Entero/RV); influenza A; influenza  A/H1; influenza A/H3; influenza A/H1-2009; influenza B; parainfluenza  viruses 1, 2, 3, 4; respiratory syncytial virus; Mycoplasma pneumoniae;  and Chlamydophila pneumoniae.      Legionella pneumophila Antigen, Urine (12.18.17 @ 14:34)    Legionella Antigen, Urine: Negative

## 2017-12-21 NOTE — PROGRESS NOTE ADULT - PROBLEM SELECTOR PLAN 1
Would continue IV unasyn,   CT scan chest given still with leukocytosis  patient seem slightly improved by PNA but still with some dsypnea  urine legionella negative.

## 2017-12-22 ENCOUNTER — TRANSCRIPTION ENCOUNTER (OUTPATIENT)
Age: 82
End: 2017-12-22

## 2017-12-22 VITALS — OXYGEN SATURATION: 97 %

## 2017-12-22 LAB
ANION GAP SERPL CALC-SCNC: 8 MMOL/L — SIGNIFICANT CHANGE UP (ref 5–17)
BUN SERPL-MCNC: 16 MG/DL — SIGNIFICANT CHANGE UP (ref 7–23)
CALCIUM SERPL-MCNC: 7.8 MG/DL — LOW (ref 8.5–10.1)
CHLORIDE SERPL-SCNC: 103 MMOL/L — SIGNIFICANT CHANGE UP (ref 96–108)
CO2 SERPL-SCNC: 30 MMOL/L — SIGNIFICANT CHANGE UP (ref 22–31)
CREAT SERPL-MCNC: 0.84 MG/DL — SIGNIFICANT CHANGE UP (ref 0.5–1.3)
GLUCOSE SERPL-MCNC: 93 MG/DL — SIGNIFICANT CHANGE UP (ref 70–99)
HCT VFR BLD CALC: 34.9 % — LOW (ref 39–50)
HGB BLD-MCNC: 12 G/DL — LOW (ref 13–17)
MCHC RBC-ENTMCNC: 32.2 PG — SIGNIFICANT CHANGE UP (ref 27–34)
MCHC RBC-ENTMCNC: 34.3 GM/DL — SIGNIFICANT CHANGE UP (ref 32–36)
MCV RBC AUTO: 93.9 FL — SIGNIFICANT CHANGE UP (ref 80–100)
PLATELET # BLD AUTO: 240 K/UL — SIGNIFICANT CHANGE UP (ref 150–400)
POTASSIUM SERPL-MCNC: 3.6 MMOL/L — SIGNIFICANT CHANGE UP (ref 3.5–5.3)
POTASSIUM SERPL-SCNC: 3.6 MMOL/L — SIGNIFICANT CHANGE UP (ref 3.5–5.3)
RBC # BLD: 3.72 M/UL — LOW (ref 4.2–5.8)
RBC # FLD: 11.6 % — SIGNIFICANT CHANGE UP (ref 11–15)
SODIUM SERPL-SCNC: 141 MMOL/L — SIGNIFICANT CHANGE UP (ref 135–145)
WBC # BLD: 13.1 K/UL — HIGH (ref 3.8–10.5)
WBC # FLD AUTO: 13.1 K/UL — HIGH (ref 3.8–10.5)

## 2017-12-22 PROCEDURE — 99238 HOSP IP/OBS DSCHRG MGMT 30/<: CPT

## 2017-12-22 PROCEDURE — 71250 CT THORAX DX C-: CPT | Mod: 26

## 2017-12-22 RX ORDER — ASPIRIN/CALCIUM CARB/MAGNESIUM 324 MG
1 TABLET ORAL
Qty: 0 | Refills: 0 | COMMUNITY
Start: 2017-12-22

## 2017-12-22 RX ADMIN — Medication 3 MILLILITER(S): at 12:03

## 2017-12-22 RX ADMIN — AMPICILLIN SODIUM AND SULBACTAM SODIUM 100 GRAM(S): 250; 125 INJECTION, POWDER, FOR SUSPENSION INTRAMUSCULAR; INTRAVENOUS at 03:56

## 2017-12-22 RX ADMIN — Medication 100 MILLIGRAM(S): at 17:09

## 2017-12-22 RX ADMIN — Medication 325 MILLIGRAM(S): at 09:35

## 2017-12-22 RX ADMIN — Medication 3 MILLILITER(S): at 05:53

## 2017-12-22 RX ADMIN — Medication 1 TABLET(S): at 17:08

## 2017-12-22 RX ADMIN — ENOXAPARIN SODIUM 30 MILLIGRAM(S): 100 INJECTION SUBCUTANEOUS at 17:08

## 2017-12-22 RX ADMIN — ATENOLOL 50 MILLIGRAM(S): 25 TABLET ORAL at 05:09

## 2017-12-22 RX ADMIN — AMPICILLIN SODIUM AND SULBACTAM SODIUM 100 GRAM(S): 250; 125 INJECTION, POWDER, FOR SUSPENSION INTRAMUSCULAR; INTRAVENOUS at 09:35

## 2017-12-22 RX ADMIN — Medication 100 MILLIGRAM(S): at 09:34

## 2017-12-22 NOTE — DISCHARGE NOTE ADULT - HOSPITAL COURSE
89 year old male with PMH of HTN, cataract Sx on R eye, otherwise presenting due to cough and SOB noted for past 2 days. Noted no fever at triage but after receiving patient to main ER noted rectal temp of 101, sepsis protocol started on care of patient immediately. Pt otherwise states has not seen a doctor for a long time. Denies chest pain. Otherwise states no sick contacts and usually in good health, no recent hospitalizations. Patient admitted to telemetry and started on IV antibiotics. 89 year old male with PMH of HTN, cataract Sx on R eye, otherwise presenting due to cough and SOB noted for past 2 days. Noted no fever at triage but after receiving patient to main ER noted rectal temp of 101, sepsis protocol started on care of patient immediately. Pt otherwise states has not seen a doctor for a long time. Denies chest pain. Otherwise states no sick contacts and usually in good health, no recent hospitalizations. Patient admitted to telemetry and started on IV antibiotics. Pt stable for discharge to [facility] w/ PO ABX.

## 2017-12-22 NOTE — PROGRESS NOTE ADULT - SUBJECTIVE AND OBJECTIVE BOX
infectious diseases progress note:    BENJIE CANDELARIO is a 89y y. o. Male patient    Patient reports: feeling improved    ROS:    EYES:  Negative  blurry vision or double vision  GASTROINTESTINAL:  Negative for nausea, vomiting, diarrhea  -otherwise negative except for subjective    Allergies    aspirin (Stomach Upset)    Intolerances        ANTIBIOTICS/RELEVANT:  antimicrobials  ampicillin/sulbactam  IVPB 1.5 Gram(s) IV Intermittent every 6 hours  ampicillin/sulbactam  IVPB        immunologic:  influenza   Vaccine 0.5 milliLiter(s) IntraMuscular once    OTHER:  acetaminophen   Tablet 650 milliGRAM(s) Oral every 6 hours PRN  ALBUTerol/ipratropium for Nebulization 3 milliLiter(s) Nebulizer every 6 hours  allopurinol 100 milliGRAM(s) Oral two times a day after meals  aspirin 325 milliGRAM(s) Oral daily  ATENolol  Tablet 50 milliGRAM(s) Oral daily  atorvastatin 10 milliGRAM(s) Oral at bedtime  enoxaparin Injectable 30 milliGRAM(s) SubCutaneous every 24 hours      Objective:  Last 24-Vital Signs Last 24 Hrs  T(C): 36.9 (22 Dec 2017 11:44), Max: 37.6 (21 Dec 2017 23:33)  T(F): 98.4 (22 Dec 2017 11:44), Max: 99.7 (21 Dec 2017 23:33)  HR: 66 (22 Dec 2017 12:04) (66 - 107)  BP: 132/82 (22 Dec 2017 11:44) (132/82 - 153/80)  BP(mean): --  RR: 18 (22 Dec 2017 11:44) (18 - 20)  SpO2: 97% (22 Dec 2017 12:04) (96% - 98%)    T(C): 36.9 (12-22-17 @ 11:44), Max: 37.6 (12-21-17 @ 23:33)  T(F): 98.4 (12-22-17 @ 11:44), Max: 99.7 (12-21-17 @ 23:33)  T(C): 36.9 (12-22-17 @ 11:44), Max: 37.6 (12-21-17 @ 23:33)  T(F): 98.4 (12-22-17 @ 11:44), Max: 99.7 (12-21-17 @ 23:33)  T(C): 36.9 (12-22-17 @ 11:44), Max: 37.6 (12-21-17 @ 23:33)  T(F): 98.4 (12-22-17 @ 11:44), Max: 99.7 (12-21-17 @ 23:33)    PHYSICAL EXAM:  Constitutional:Well-developed, well nourished  Eyes:PERRLA, EOMI, erythema left eye  Ear/Nose/Throat: oropharynx normal	  Neck:no JVD, no lymphadenopathy, supple  Respiratory: no accessory muscle use, lung fields with some ronchi on right  Cardiovascular:RRR, normal S1, S2 no m/r/g  Gastrointestinal:soft, NT, no HSM, BS-normal  Extremities:no clubbing, no cyanosis, edema absent  Neuro-patient alert, oriented and appropriate  Skin-no sig lesions      LABS:                        12.0   13.1  )-----------( 240      ( 22 Dec 2017 06:53 )             34.9       WBC 13.1  12-22 @ 06:53  WBC 17.9  12-20 @ 06:27  WBC 22.3  12-19 @ 06:37  WBC 30.8  12-18 @ 08:07      12-22    141  |  103  |  16  ----------------------------<  93  3.6   |  30  |  0.84    Ca    7.8<L>      22 Dec 2017 06:53              MICROBIOLOGY:        RADIOLOGY & ADDITIONAL STUDIES:    < from: CT Chest No Cont (12.22.17 @ 11:00) >    EXAM:  CT CHEST                            PROCEDURE DATE:  12/22/2017          INTERPRETATION:  CLINICAL INFORMATION: Dyspnea and cough    COMPARISON: None.    PROCEDURE:   CT of the Chest was performed without intravenous contrast.  Sagittal and coronal reformats were performed.      FINDINGS:    CHEST:     LUNGS AND LARGE AIRWAYS: Patent central airways.  Emphysema. Right upper   and lower lobe opacities. Lower lobe atelectasis.  PLEURA: Small bilateral pleural effusions, right larger than left.  VESSELS: Atherosclerotic changes of thoracic aorta and coronary arteries.  HEART: Heart size is normal. Trace pericardial effusion.  MEDIASTINUM AND ROBBIE: Several prominent mediastinal lymph nodes.  CHEST WALL AND LOWER NECK: Within normal limits.  VISUALIZED UPPER ABDOMEN: Moderate hiatal hernia and partially   intrathoracic stomach. Cholelithiasis. Nonspecific perinephric stranding.   Atrophic pancreas.  BONES: Shoulder and spine degenerative changes.    IMPRESSION:     Emphysema.  Right upper and lower lobe opacities, compatible with multifocal   pneumonia.  Small bilateral pleural effusions, right larger than left.

## 2017-12-22 NOTE — DISCHARGE NOTE ADULT - PLAN OF CARE
Continue augmentin 825mg BID PO discharge to rehab with po antibiotics until 12/28/17 rate controlled. on aspirin 325mg improved leukocytosis. Continue po augmentin in rehab continue home medications discharge to rehab Continue augmentin 875mg BID PO

## 2017-12-22 NOTE — PROGRESS NOTE ADULT - ASSESSMENT
89M with HTN admitted for dyspnea sepsis secondary right sided pneumonia
89m with history of cough with multifocal pneumonia     IMPROVE VTE Individual Risk Assessment          RISK                                                          Points    [  ] Previous VTE                                                3    [  ] Thrombophilia                                             2    [  ] Lower limb paralysis                                    2        (unable to hold up >15 seconds)      [  ] Current Cancer                                             2         (within 6 months)    [x  ] Immobilization > 24 hrs                              1    [  ] ICU/CCU stay > 24 hours                            1    [ x ] Age > 60                                                    1    IMPROVE VTE Score ___2____    1

## 2017-12-22 NOTE — DISCHARGE NOTE ADULT - PATIENT PORTAL LINK FT
“You can access the [facility] Patient Portal, offered by [facility], by registering with the following website: [email or web address]”

## 2017-12-22 NOTE — DISCHARGE NOTE ADULT - CARE PLAN
Principal Discharge DX:	Multifocal pneumonia  Goal:	Continue augmentin 825mg BID PO  Instructions for follow-up, activity and diet:	discharge to rehab with po antibiotics until 12/28/17  Secondary Diagnosis:	Paroxysmal atrial fibrillation  Instructions for follow-up, activity and diet:	rate controlled. on aspirin 325mg  Secondary Diagnosis:	Sepsis  Instructions for follow-up, activity and diet:	improved leukocytosis. Continue po augmentin in rehab  Secondary Diagnosis:	Essential hypertension  Instructions for follow-up, activity and diet:	continue home medications  Secondary Diagnosis:	Weakness  Instructions for follow-up, activity and diet:	discharge to rehab Principal Discharge DX:	Multifocal pneumonia  Goal:	Continue augmentin 875mg BID PO  Instructions for follow-up, activity and diet:	discharge to rehab with po antibiotics until 12/28/17  Secondary Diagnosis:	Paroxysmal atrial fibrillation  Instructions for follow-up, activity and diet:	rate controlled. on aspirin 325mg  Secondary Diagnosis:	Sepsis  Instructions for follow-up, activity and diet:	improved leukocytosis. Continue po augmentin in rehab  Secondary Diagnosis:	Essential hypertension  Instructions for follow-up, activity and diet:	continue home medications  Secondary Diagnosis:	Weakness  Instructions for follow-up, activity and diet:	discharge to rehab

## 2017-12-22 NOTE — PROGRESS NOTE ADULT - PROBLEM SELECTOR PLAN 1
PAtient improving and today will change over to oral abx  Augmentin 875mg PO BID until 12-28  fine for transition to rehab per ID    Thank you for consulting us and involving us in the management of this most interesting and challenging case.     Please Call with any further questions

## 2017-12-22 NOTE — DISCHARGE NOTE ADULT - CARE PROVIDER_API CALL
PMD,   Phone: (   )    -  Fax: (   )    -    Nicolle Carrillo), Infectious Disease; Internal Medicine  [address] [place] [address]  Phone: [phone]  Fax: [phone]

## 2017-12-23 LAB
CULTURE RESULTS: SIGNIFICANT CHANGE UP
CULTURE RESULTS: SIGNIFICANT CHANGE UP
SPECIMEN SOURCE: SIGNIFICANT CHANGE UP
SPECIMEN SOURCE: SIGNIFICANT CHANGE UP

## 2017-12-26 ENCOUNTER — OUTPATIENT (OUTPATIENT)
Dept: OUTPATIENT SERVICES | Facility: HOSPITAL | Age: 82
LOS: 1 days | Discharge: HOME | End: 2017-12-26
Payer: MEDICARE

## 2017-12-26 DIAGNOSIS — J18.9 PNEUMONIA, UNSPECIFIED ORGANISM: ICD-10-CM

## 2017-12-26 DIAGNOSIS — Z98.41 CATARACT EXTRACTION STATUS, RIGHT EYE: Chronic | ICD-10-CM

## 2017-12-26 PROBLEM — E78.00 PURE HYPERCHOLESTEROLEMIA, UNSPECIFIED: Chronic | Status: ACTIVE | Noted: 2017-12-18

## 2017-12-26 PROBLEM — I10 ESSENTIAL (PRIMARY) HYPERTENSION: Chronic | Status: ACTIVE | Noted: 2017-12-18

## 2017-12-26 PROCEDURE — 71010: CPT | Mod: 26

## 2017-12-27 DIAGNOSIS — A41.9 SEPSIS, UNSPECIFIED ORGANISM: ICD-10-CM

## 2017-12-27 DIAGNOSIS — I48.0 PAROXYSMAL ATRIAL FIBRILLATION: ICD-10-CM

## 2017-12-27 DIAGNOSIS — Z98.41 CATARACT EXTRACTION STATUS, RIGHT EYE: ICD-10-CM

## 2017-12-27 DIAGNOSIS — J15.6 PNEUMONIA DUE TO OTHER GRAM-NEGATIVE BACTERIA: ICD-10-CM

## 2017-12-27 DIAGNOSIS — R53.1 WEAKNESS: ICD-10-CM

## 2017-12-27 DIAGNOSIS — I10 ESSENTIAL (PRIMARY) HYPERTENSION: ICD-10-CM

## 2017-12-27 DIAGNOSIS — Z28.21 IMMUNIZATION NOT CARRIED OUT BECAUSE OF PATIENT REFUSAL: ICD-10-CM

## 2017-12-27 DIAGNOSIS — R06.02 SHORTNESS OF BREATH: ICD-10-CM

## 2017-12-27 DIAGNOSIS — E78.00 PURE HYPERCHOLESTEROLEMIA, UNSPECIFIED: ICD-10-CM

## 2018-01-04 ENCOUNTER — APPOINTMENT (OUTPATIENT)
Dept: RADIOLOGY | Facility: HOSPITAL | Age: 83
End: 2018-01-04

## 2018-01-04 ENCOUNTER — OUTPATIENT (OUTPATIENT)
Dept: OUTPATIENT SERVICES | Facility: HOSPITAL | Age: 83
LOS: 1 days | Discharge: HOME | End: 2018-01-04
Payer: MEDICARE

## 2018-01-04 DIAGNOSIS — R13.12 DYSPHAGIA, OROPHARYNGEAL PHASE: ICD-10-CM

## 2018-01-04 DIAGNOSIS — Z98.41 CATARACT EXTRACTION STATUS, RIGHT EYE: Chronic | ICD-10-CM

## 2018-01-04 PROCEDURE — 70371 SPEECH EVALUATION COMPLEX: CPT | Mod: 26

## 2018-01-06 ENCOUNTER — EMERGENCY (EMERGENCY)
Facility: HOSPITAL | Age: 83
LOS: 0 days | Discharge: HOME | End: 2018-01-06
Attending: EMERGENCY MEDICINE
Payer: MEDICARE

## 2018-01-06 VITALS
OXYGEN SATURATION: 96 % | TEMPERATURE: 98 F | RESPIRATION RATE: 16 BRPM | HEIGHT: 72 IN | SYSTOLIC BLOOD PRESSURE: 179 MMHG | DIASTOLIC BLOOD PRESSURE: 81 MMHG | HEART RATE: 71 BPM

## 2018-01-06 DIAGNOSIS — E78.00 PURE HYPERCHOLESTEROLEMIA, UNSPECIFIED: ICD-10-CM

## 2018-01-06 DIAGNOSIS — S01.01XA LACERATION WITHOUT FOREIGN BODY OF SCALP, INITIAL ENCOUNTER: ICD-10-CM

## 2018-01-06 DIAGNOSIS — Z88.8 ALLERGY STATUS TO OTHER DRUGS, MEDICAMENTS AND BIOLOGICAL SUBSTANCES STATUS: ICD-10-CM
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
## 2018-01-06 DIAGNOSIS — S09.90XA UNSPECIFIED INJURY OF HEAD, INITIAL ENCOUNTER: ICD-10-CM

## 2018-01-06 DIAGNOSIS — Z79.2 LONG TERM (CURRENT) USE OF ANTIBIOTICS: ICD-10-CM
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
## 2018-01-06 DIAGNOSIS — Z98.41 CATARACT EXTRACTION STATUS, RIGHT EYE: Chronic | ICD-10-CM

## 2018-01-06 DIAGNOSIS — I10 ESSENTIAL (PRIMARY) HYPERTENSION: ICD-10-CM

## 2018-01-06 DIAGNOSIS — Z79.82 LONG TERM (CURRENT) USE OF ASPIRIN: ICD-10-CM

## 2018-01-06 PROCEDURE — 70450 CT HEAD/BRAIN W/O DYE: CPT | Mod: 26

## 2018-01-06 PROCEDURE — 72125 CT NECK SPINE W/O DYE: CPT | Mod: 26

## 2018-01-06 PROCEDURE — 99284 EMERGENCY DEPT VISIT MOD MDM: CPT | Mod: 25

## 2018-01-06 PROCEDURE — 12002 RPR S/N/AX/GEN/TRNK2.6-7.5CM: CPT

## 2018-01-06 NOTE — ED ADULT NURSE NOTE - CHIEF COMPLAINT QUOTE
Found on floor in [facility] [address], pt states tripped over a wire and hit head, No LOC , head laceration to left side of head

## 2018-01-06 NOTE — ED ADULT TRIAGE NOTE - CHIEF COMPLAINT QUOTE
Found on floor in [facility], pt states tripped over a wire and hit head, No LOC , head laceration to left side of head

## 2018-01-06 NOTE — ED ADULT NURSE NOTE - OBJECTIVE STATEMENT
received ft brought from [facility] s/p trip and fall, laceration to top of scalp, moderate bleeding noted no loc per tx papers, pt alert, oriented to person/place no dizziness at present no n/v

## 2018-01-08 ENCOUNTER — OUTPATIENT (OUTPATIENT)
Dept: OUTPATIENT SERVICES | Facility: HOSPITAL | Age: 83
LOS: 1 days | Discharge: HOME | End: 2018-01-08
Payer: MEDICARE

## 2018-01-08 DIAGNOSIS — R52 PAIN, UNSPECIFIED: ICD-10-CM

## 2018-01-08 DIAGNOSIS — Z98.41 CATARACT EXTRACTION STATUS, RIGHT EYE: Chronic | ICD-10-CM

## 2018-01-08 PROCEDURE — 72100 X-RAY EXAM L-S SPINE 2/3 VWS: CPT | Mod: 26

## 2018-01-09 ENCOUNTER — OUTPATIENT (OUTPATIENT)
Dept: OUTPATIENT SERVICES | Facility: HOSPITAL | Age: 83
LOS: 1 days | Discharge: HOME | End: 2018-01-09
Payer: MEDICARE

## 2018-01-09 DIAGNOSIS — Z98.41 CATARACT EXTRACTION STATUS, RIGHT EYE: Chronic | ICD-10-CM

## 2018-01-09 DIAGNOSIS — J18.9 PNEUMONIA, UNSPECIFIED ORGANISM: ICD-10-CM

## 2018-01-09 PROCEDURE — 71045 X-RAY EXAM CHEST 1 VIEW: CPT | Mod: 26

## 2018-01-11 ENCOUNTER — INPATIENT (INPATIENT)
Facility: HOSPITAL | Age: 83
LOS: 32 days | Discharge: HOSPICE | End: 2018-02-13
Attending: FAMILY MEDICINE | Admitting: FAMILY MEDICINE
Payer: MEDICARE

## 2018-01-11 VITALS
OXYGEN SATURATION: 98 % | HEART RATE: 109 BPM | RESPIRATION RATE: 24 BRPM | HEIGHT: 69 IN | TEMPERATURE: 98 F | SYSTOLIC BLOOD PRESSURE: 128 MMHG | DIASTOLIC BLOOD PRESSURE: 79 MMHG | WEIGHT: 179.9 LBS

## 2018-01-11 DIAGNOSIS — Z98.41 CATARACT EXTRACTION STATUS, RIGHT EYE: Chronic | ICD-10-CM

## 2018-01-11 LAB
ALBUMIN SERPL ELPH-MCNC: 2.4 G/DL — LOW (ref 3.3–5)
ALP SERPL-CCNC: 63 U/L — SIGNIFICANT CHANGE UP (ref 40–120)
ALT FLD-CCNC: 15 U/L — SIGNIFICANT CHANGE UP (ref 12–78)
ANION GAP SERPL CALC-SCNC: 5 MMOL/L — SIGNIFICANT CHANGE UP (ref 5–17)
APPEARANCE UR: ABNORMAL
APTT BLD: 33.7 SEC — SIGNIFICANT CHANGE UP (ref 27.5–37.4)
AST SERPL-CCNC: 29 U/L — SIGNIFICANT CHANGE UP (ref 15–37)
BACTERIA # UR AUTO: ABNORMAL
BASE EXCESS BLDA CALC-SCNC: 3 MMOL/L — HIGH (ref -2–2)
BASOPHILS # BLD AUTO: 0.1 K/UL — SIGNIFICANT CHANGE UP (ref 0–0.2)
BASOPHILS NFR BLD AUTO: 0.8 % — SIGNIFICANT CHANGE UP (ref 0–2)
BILIRUB SERPL-MCNC: 0.6 MG/DL — SIGNIFICANT CHANGE UP (ref 0.2–1.2)
BILIRUB UR-MCNC: NEGATIVE — SIGNIFICANT CHANGE UP
BLOOD GAS COMMENTS: SIGNIFICANT CHANGE UP
BLOOD GAS COMMENTS: SIGNIFICANT CHANGE UP
BLOOD GAS SOURCE: SIGNIFICANT CHANGE UP
BUN SERPL-MCNC: 21 MG/DL — SIGNIFICANT CHANGE UP (ref 7–23)
CALCIUM SERPL-MCNC: 8.1 MG/DL — LOW (ref 8.5–10.1)
CHLORIDE SERPL-SCNC: 103 MMOL/L — SIGNIFICANT CHANGE UP (ref 96–108)
CO2 SERPL-SCNC: 32 MMOL/L — HIGH (ref 22–31)
COLOR SPEC: YELLOW — SIGNIFICANT CHANGE UP
CREAT SERPL-MCNC: 0.99 MG/DL — SIGNIFICANT CHANGE UP (ref 0.5–1.3)
DIFF PNL FLD: ABNORMAL
EOSINOPHIL # BLD AUTO: 0.1 K/UL — SIGNIFICANT CHANGE UP (ref 0–0.5)
EOSINOPHIL NFR BLD AUTO: 0.4 % — SIGNIFICANT CHANGE UP (ref 0–6)
EPI CELLS # UR: SIGNIFICANT CHANGE UP
FLUAV H1 2009 PAND RNA SPEC QL NAA+PROBE: DETECTED
FLUAV SPEC QL CULT: POSITIVE
FLUBV AG SPEC QL IA: NEGATIVE — SIGNIFICANT CHANGE UP
GLUCOSE BLDC GLUCOMTR-MCNC: 130 MG/DL — HIGH (ref 70–99)
GLUCOSE SERPL-MCNC: 153 MG/DL — HIGH (ref 70–99)
GLUCOSE UR QL: NEGATIVE MG/DL — SIGNIFICANT CHANGE UP
HCO3 BLDA-SCNC: 28 MMOL/L — SIGNIFICANT CHANGE UP (ref 21–29)
HCT VFR BLD CALC: 39.1 % — SIGNIFICANT CHANGE UP (ref 39–50)
HGB BLD-MCNC: 13.2 G/DL — SIGNIFICANT CHANGE UP (ref 13–17)
HOROWITZ INDEX BLDA+IHG-RTO: 100 — SIGNIFICANT CHANGE UP
INR BLD: 1.25 RATIO — HIGH (ref 0.88–1.16)
KETONES UR-MCNC: NEGATIVE — SIGNIFICANT CHANGE UP
LACTATE SERPL-SCNC: 1.5 MMOL/L — SIGNIFICANT CHANGE UP (ref 0.7–2)
LEUKOCYTE ESTERASE UR-ACNC: ABNORMAL
LYMPHOCYTES # BLD AUTO: 0.7 K/UL — LOW (ref 1–3.3)
LYMPHOCYTES # BLD AUTO: 4.9 % — LOW (ref 13–44)
MCHC RBC-ENTMCNC: 31.9 PG — SIGNIFICANT CHANGE UP (ref 27–34)
MCHC RBC-ENTMCNC: 33.7 GM/DL — SIGNIFICANT CHANGE UP (ref 32–36)
MCV RBC AUTO: 94.6 FL — SIGNIFICANT CHANGE UP (ref 80–100)
MONOCYTES # BLD AUTO: 1.3 K/UL — HIGH (ref 0–0.9)
MONOCYTES NFR BLD AUTO: 9.3 % — SIGNIFICANT CHANGE UP (ref 2–14)
NEUTROPHILS # BLD AUTO: 12.2 K/UL — HIGH (ref 1.8–7.4)
NEUTROPHILS NFR BLD AUTO: 84.6 % — HIGH (ref 43–77)
NITRITE UR-MCNC: NEGATIVE — SIGNIFICANT CHANGE UP
NT-PROBNP SERPL-SCNC: 2433 PG/ML — HIGH (ref 0–450)
PCO2 BLDA: 46 MMHG — SIGNIFICANT CHANGE UP (ref 32–46)
PH BLD: 7.4 — SIGNIFICANT CHANGE UP (ref 7.35–7.45)
PH UR: 5 — SIGNIFICANT CHANGE UP (ref 5–8)
PLATELET # BLD AUTO: 244 K/UL — SIGNIFICANT CHANGE UP (ref 150–400)
PO2 BLDA: 305 MMHG — HIGH (ref 74–108)
POTASSIUM SERPL-MCNC: 3.9 MMOL/L — SIGNIFICANT CHANGE UP (ref 3.5–5.3)
POTASSIUM SERPL-SCNC: 3.9 MMOL/L — SIGNIFICANT CHANGE UP (ref 3.5–5.3)
PROT SERPL-MCNC: 6.7 GM/DL — SIGNIFICANT CHANGE UP (ref 6–8.3)
PROT UR-MCNC: 30 MG/DL
PROTHROM AB SERPL-ACNC: 13.7 SEC — HIGH (ref 9.8–12.7)
RAPID RVP RESULT: DETECTED
RBC # BLD: 4.13 M/UL — LOW (ref 4.2–5.8)
RBC # FLD: 12.5 % — SIGNIFICANT CHANGE UP (ref 11–15)
RBC CASTS # UR COMP ASSIST: >50 /HPF (ref 0–4)
SAO2 % BLDA: 100 % — HIGH (ref 92–96)
SODIUM SERPL-SCNC: 140 MMOL/L — SIGNIFICANT CHANGE UP (ref 135–145)
SP GR SPEC: 1.02 — SIGNIFICANT CHANGE UP (ref 1.01–1.02)
UROBILINOGEN FLD QL: NEGATIVE MG/DL — SIGNIFICANT CHANGE UP
WBC # BLD: 14.5 K/UL — HIGH (ref 3.8–10.5)
WBC # FLD AUTO: 14.5 K/UL — HIGH (ref 3.8–10.5)
WBC UR QL: SIGNIFICANT CHANGE UP

## 2018-01-11 PROCEDURE — 93010 ELECTROCARDIOGRAM REPORT: CPT

## 2018-01-11 PROCEDURE — 99285 EMERGENCY DEPT VISIT HI MDM: CPT

## 2018-01-11 PROCEDURE — 71045 X-RAY EXAM CHEST 1 VIEW: CPT | Mod: 26

## 2018-01-11 PROCEDURE — 99223 1ST HOSP IP/OBS HIGH 75: CPT

## 2018-01-11 RX ORDER — SODIUM CHLORIDE 9 MG/ML
3 INJECTION INTRAMUSCULAR; INTRAVENOUS; SUBCUTANEOUS ONCE
Qty: 0 | Refills: 0 | Status: COMPLETED | OUTPATIENT
Start: 2018-01-11 | End: 2018-01-11

## 2018-01-11 RX ORDER — ASPIRIN/CALCIUM CARB/MAGNESIUM 324 MG
325 TABLET ORAL DAILY
Qty: 0 | Refills: 0 | Status: DISCONTINUED | OUTPATIENT
Start: 2018-01-12 | End: 2018-01-12

## 2018-01-11 RX ORDER — ATORVASTATIN CALCIUM 80 MG/1
20 TABLET, FILM COATED ORAL AT BEDTIME
Qty: 0 | Refills: 0 | Status: DISCONTINUED | OUTPATIENT
Start: 2018-01-11 | End: 2018-02-10

## 2018-01-11 RX ORDER — AZITHROMYCIN 500 MG/1
500 TABLET, FILM COATED ORAL DAILY
Qty: 0 | Refills: 0 | Status: DISCONTINUED | OUTPATIENT
Start: 2018-01-11 | End: 2018-01-20

## 2018-01-11 RX ORDER — PIPERACILLIN AND TAZOBACTAM 4; .5 G/20ML; G/20ML
3.38 INJECTION, POWDER, LYOPHILIZED, FOR SOLUTION INTRAVENOUS EVERY 12 HOURS
Qty: 0 | Refills: 0 | Status: DISCONTINUED | OUTPATIENT
Start: 2018-01-11 | End: 2018-01-11

## 2018-01-11 RX ORDER — VANCOMYCIN HCL 1 G
750 VIAL (EA) INTRAVENOUS EVERY 12 HOURS
Qty: 0 | Refills: 0 | Status: DISCONTINUED | OUTPATIENT
Start: 2018-01-11 | End: 2018-01-21

## 2018-01-11 RX ORDER — ACETAMINOPHEN 500 MG
650 TABLET ORAL ONCE
Qty: 0 | Refills: 0 | Status: COMPLETED | OUTPATIENT
Start: 2018-01-11 | End: 2018-01-11

## 2018-01-11 RX ORDER — MEROPENEM 1 G/30ML
INJECTION INTRAVENOUS
Qty: 0 | Refills: 0 | Status: DISCONTINUED | OUTPATIENT
Start: 2018-01-11 | End: 2018-02-09

## 2018-01-11 RX ORDER — DOCUSATE SODIUM 100 MG
1 CAPSULE ORAL
Qty: 0 | Refills: 0 | COMMUNITY

## 2018-01-11 RX ORDER — SENNA PLUS 8.6 MG/1
1 TABLET ORAL
Qty: 0 | Refills: 0 | COMMUNITY

## 2018-01-11 RX ORDER — ATENOLOL 25 MG/1
50 TABLET ORAL DAILY
Qty: 0 | Refills: 0 | Status: DISCONTINUED | OUTPATIENT
Start: 2018-01-11 | End: 2018-02-13

## 2018-01-11 RX ORDER — MAGNESIUM HYDROXIDE 400 MG/1
15 TABLET, CHEWABLE ORAL
Qty: 0 | Refills: 0 | COMMUNITY

## 2018-01-11 RX ORDER — ENOXAPARIN SODIUM 100 MG/ML
80 INJECTION SUBCUTANEOUS
Qty: 0 | Refills: 0 | Status: DISCONTINUED | OUTPATIENT
Start: 2018-01-11 | End: 2018-01-11

## 2018-01-11 RX ORDER — SODIUM CHLORIDE 9 MG/ML
1000 INJECTION, SOLUTION INTRAVENOUS
Qty: 0 | Refills: 0 | Status: DISCONTINUED | OUTPATIENT
Start: 2018-01-11 | End: 2018-01-17

## 2018-01-11 RX ORDER — MEROPENEM 1 G/30ML
1000 INJECTION INTRAVENOUS EVERY 8 HOURS
Qty: 0 | Refills: 0 | Status: DISCONTINUED | OUTPATIENT
Start: 2018-01-11 | End: 2018-02-09

## 2018-01-11 RX ORDER — PIPERACILLIN AND TAZOBACTAM 4; .5 G/20ML; G/20ML
3.38 INJECTION, POWDER, LYOPHILIZED, FOR SOLUTION INTRAVENOUS ONCE
Qty: 0 | Refills: 0 | Status: COMPLETED | OUTPATIENT
Start: 2018-01-11 | End: 2018-01-11

## 2018-01-11 RX ORDER — PANTOPRAZOLE SODIUM 20 MG/1
40 TABLET, DELAYED RELEASE ORAL EVERY 12 HOURS
Qty: 0 | Refills: 0 | Status: DISCONTINUED | OUTPATIENT
Start: 2018-01-11 | End: 2018-02-13

## 2018-01-11 RX ORDER — ACETAMINOPHEN 500 MG
650 TABLET ORAL EVERY 6 HOURS
Qty: 0 | Refills: 0 | Status: DISCONTINUED | OUTPATIENT
Start: 2018-01-11 | End: 2018-02-13

## 2018-01-11 RX ORDER — IPRATROPIUM/ALBUTEROL SULFATE 18-103MCG
3 AEROSOL WITH ADAPTER (GRAM) INHALATION EVERY 6 HOURS
Qty: 0 | Refills: 0 | Status: DISCONTINUED | OUTPATIENT
Start: 2018-01-11 | End: 2018-02-10

## 2018-01-11 RX ORDER — VANCOMYCIN HCL 1 G
1000 VIAL (EA) INTRAVENOUS ONCE
Qty: 0 | Refills: 0 | Status: COMPLETED | OUTPATIENT
Start: 2018-01-11 | End: 2018-01-11

## 2018-01-11 RX ORDER — BUDESONIDE, MICRONIZED 100 %
0.5 POWDER (GRAM) MISCELLANEOUS EVERY 12 HOURS
Qty: 0 | Refills: 0 | Status: DISCONTINUED | OUTPATIENT
Start: 2018-01-11 | End: 2018-02-10

## 2018-01-11 RX ORDER — LACTOBACILLUS ACIDOPHILUS 100MM CELL
2 CAPSULE ORAL
Qty: 0 | Refills: 0 | COMMUNITY

## 2018-01-11 RX ORDER — ENOXAPARIN SODIUM 100 MG/ML
80 INJECTION SUBCUTANEOUS DAILY
Qty: 0 | Refills: 0 | Status: DISCONTINUED | OUTPATIENT
Start: 2018-01-11 | End: 2018-01-11

## 2018-01-11 RX ORDER — MEROPENEM 1 G/30ML
1000 INJECTION INTRAVENOUS ONCE
Qty: 0 | Refills: 0 | Status: COMPLETED | OUTPATIENT
Start: 2018-01-11 | End: 2018-01-11

## 2018-01-11 RX ORDER — ENOXAPARIN SODIUM 100 MG/ML
60 INJECTION SUBCUTANEOUS EVERY 12 HOURS
Qty: 0 | Refills: 0 | Status: DISCONTINUED | OUTPATIENT
Start: 2018-01-11 | End: 2018-01-11

## 2018-01-11 RX ORDER — LACTOBACILLUS ACIDOPHILUS 100MM CELL
1 CAPSULE ORAL EVERY 12 HOURS
Qty: 0 | Refills: 0 | Status: DISCONTINUED | OUTPATIENT
Start: 2018-01-11 | End: 2018-02-13

## 2018-01-11 RX ADMIN — Medication 650 MILLIGRAM(S): at 13:43

## 2018-01-11 RX ADMIN — Medication 1 TABLET(S): at 22:52

## 2018-01-11 RX ADMIN — ATENOLOL 50 MILLIGRAM(S): 25 TABLET ORAL at 22:53

## 2018-01-11 RX ADMIN — SODIUM CHLORIDE 3 MILLILITER(S): 9 INJECTION INTRAMUSCULAR; INTRAVENOUS; SUBCUTANEOUS at 08:56

## 2018-01-11 RX ADMIN — AZITHROMYCIN 500 MILLIGRAM(S): 500 TABLET, FILM COATED ORAL at 16:54

## 2018-01-11 RX ADMIN — MEROPENEM 100 MILLIGRAM(S): 1 INJECTION INTRAVENOUS at 18:36

## 2018-01-11 RX ADMIN — ENOXAPARIN SODIUM 80 MILLIGRAM(S): 100 INJECTION SUBCUTANEOUS at 18:59

## 2018-01-11 RX ADMIN — PANTOPRAZOLE SODIUM 40 MILLIGRAM(S): 20 TABLET, DELAYED RELEASE ORAL at 18:41

## 2018-01-11 RX ADMIN — Medication 75 MILLIGRAM(S): at 13:43

## 2018-01-11 RX ADMIN — PIPERACILLIN AND TAZOBACTAM 100 GRAM(S): 4; .5 INJECTION, POWDER, LYOPHILIZED, FOR SOLUTION INTRAVENOUS at 14:14

## 2018-01-11 RX ADMIN — ATORVASTATIN CALCIUM 20 MILLIGRAM(S): 80 TABLET, FILM COATED ORAL at 22:53

## 2018-01-11 RX ADMIN — Medication 150 MILLIGRAM(S): at 16:55

## 2018-01-11 NOTE — ED ADULT NURSE REASSESSMENT NOTE - NS ED NURSE REASSESS COMMENT FT1
Pa was notifed about po pill not given pt is NPO, a message was left for md See to call hospital to clarify order

## 2018-01-11 NOTE — CONSULT NOTE ADULT - SUBJECTIVE AND OBJECTIVE BOX
CARDIOLOGY CONSULT NOTE    Patient is a 89y Male with a known history of :    HPI:  89 y/o white male with hx Paroxysmal A Fib, HTN, HL, Gout, s/p Rt Cataract Surgery, admitted today via the ER from [facility] with SOB initially requiring NRB O2 and found to be febrile to 101.7 with wheezing and rhonchi on physical exam.  W/u with Rapid Influenza testing was done and now reported Positive for Influenza A and 2 Blood Cx's and Urine Cx were obtained and pending.  CXR was done and reported now with a RLL Infiltrate along with trace Pleural effusion.    Recently admitted to [facility] from 12/18 to 12/22 during which time he was rx'ed for Multifocal Rt PNA.  Patient now c/o cough with only intermittent sputum production since yesterday along with increased SOB and fatigue.  No c/o cp, no abdominal pain, no N/V but c/o soft stools which he cannot always control.  No c/o dysuria or hesitancy, and claims his appetite is fair.   Patient was begun empirically on Zosyn and Vanco and rec'd Tamiflu x 1 dose.    REVIEW OF SYSTEMS:    CONSTITUTIONAL: + fever and fatigue  EYES: No eye pain, visual disturbances, or discharge  ENMT:  No difficulty hearing, tinnitus, vertigo; No sinus or throat pain  NECK: No pain or stiffness  BREASTS: No pain, masses, or nipple discharge  RESPIRATORY: +cough / wheezing / shortness of breath  CARDIOVASCULAR: No chest pain, palpitations, dizziness, or leg swelling  GASTROINTESTINAL: No abdominal or epigastric pain. No nausea, vomiting, or hematemesis; No diarrhea or constipation. No melena or hematochezia.  GENITOURINARY: No dysuria, frequency, hematuria, or incontinence  NEUROLOGICAL: No headaches, memory loss, loss of strength, numbness, or tremors  SKIN: No itching, burning, rashes, or lesions   LYMPH NODES: No enlarged glands  ENDOCRINE: No heat or cold intolerance; No hair loss  MUSCULOSKELETAL: No joint pain or swelling; No muscle, back, or extremity pain  PSYCHIATRIC: No depression, anxiety, mood swings, or difficulty sleeping  HEME/LYMPH: No easy bruising, or bleeding gums  ALLERGY AND IMMUNOLOGIC: No hives or eczema    MEDICATIONS  (STANDING):  azithromycin   Tablet 500 milliGRAM(s) Oral daily  dextrose 5% + sodium chloride 0.9%. 1000 milliLiter(s) (75 mL/Hr) IV Continuous <Continuous>  enoxaparin Injectable 80 milliGRAM(s) SubCutaneous daily  lactobacillus acidophilus 1 Tablet(s) Oral every 12 hours  meropenem IVPB      meropenem IVPB 1000 milliGRAM(s) IV Intermittent once  meropenem IVPB 1000 milliGRAM(s) IV Intermittent every 8 hours  oseltamivir 30 milliGRAM(s) Oral two times a day  pantoprazole  Injectable 40 milliGRAM(s) IV Push every 12 hours  vancomycin  IVPB 750 milliGRAM(s) IV Intermittent every 12 hours    MEDICATIONS  (PRN):  acetaminophen  Suppository 650 milliGRAM(s) Rectal every 6 hours PRN For Temp greater than 38 C (100.4 F)    Home Medications:  acetaminophen 650 mg oral tablet:  (06 Jan 2018 15:51)  allopurinol 100 mg oral tablet: 1 tab(s) orally 2 times a day (06 Jan 2018 15:51)  aspirin 325 mg oral tablet: 1 tab(s) orally once a day (06 Jan 2018 15:51)  atenolol 50 mg oral tablet: 1 tab(s) orally once a day (06 Jan 2018 15:51)  Bacid (LAC) oral tablet: 2 tab(s) orally once a day (06 Jan 2018 15:51)  Colace 100 mg oral capsule: 1 cap(s) orally 2 times a day (06 Jan 2018 15:51)  hydroCHLOROthiazide 25 mg oral tablet: 1 tab(s) orally once a day (06 Jan 2018 15:51)  Lipitor 10 mg oral tablet: 1 tab(s) orally once a day (06 Jan 2018 15:51)  Milk of Magnesia 8% oral suspension: 15 milliliter(s) orally once a day (at bedtime), As Needed (06 Jan 2018 15:51)  Senna 8.6 mg oral tablet: 1 tab(s) orally once a day (at bedtime) (06 Jan 2018 15:51)  Zosyn 3 g-0.375 g/50 mL intravenous solution: intravenous every 12 hours (11 Jan 2018 17:39)        ALLERGIES: aspirin (Stomach Upset)      FAMILY HISTORY:  No pertinent family history in first degree relatives      PHYSICAL EXAMINATION:  -----------------------------  T(C): 36.8 (01-11-18 @ 11:17), Max: 38.7 (01-11-18 @ 08:39)  HR: 106 (01-11-18 @ 16:57) (106 - 116)  BP: 135/66 (01-11-18 @ 16:57) (121/60 - 135/66)  RR: 27 (01-11-18 @ 16:57) (21 - 32)  SpO2: 95% (01-11-18 @ 16:57) (90% - 98%)  Wt(kg): --    Height (cm): 175.26 (01-11 @ 08:23)  Weight (kg): 81.6 (01-11 @ 08:23)  BMI (kg/m2): 26.6 (01-11 @ 08:23)  BSA (m2): 1.97 (01-11 @ 08:23)    Constitutional: mild respiratory distress.   Eyes: the conjunctiva exhibited no abnormalities and the eyelids demonstrated no xanthelasmas.   HEENT: normal oral mucosa, no oral pallor and no oral cyanosis.   Neck: normal jugular venous A waves present, normal jugular venous V waves present and no jugular venous kamara A waves.   Pulmonary: coarse BS bilaterally; decreased at the bases; diffuse wheezing  Cardiovascular: heart rate and rhythm were normal, normal S1 and S2 and no murmur, gallop, rub, heave or thrill are present.   Abdomen: soft, non-tender, no hepato-splenomegaly and no abdominal mass palpated.   Musculoskeletal: the gait could not be assessed..   Extremities: no clubbing of the fingernails, no localized cyanosis, no petechial hemorrhages and no ischemic changes.   Skin: normal skin color and pigmentation, no rash, no venous stasis, no skin lesions, no skin ulcer and no xanthoma was observed.   Psychiatric: oriented to person, place, and time, the affect was normal, the mood was normal and not feeling anxious.     LABS:   --------  01-11    140  |  103  |  21  ----------------------------<  153<H>  3.9   |  32<H>  |  0.99    Ca    8.1<L>      11 Jan 2018 08:59    TPro  6.7  /  Alb  2.4<L>  /  TBili  0.6  /  DBili  x   /  AST  29  /  ALT  15  /  AlkPhos  63  01-11                         13.2   14.5  )-----------( 244      ( 11 Jan 2018 08:59 )             39.1     PT/INR - ( 11 Jan 2018 08:59 )   PT: 13.7 sec;   INR: 1.25 ratio         PTT - ( 11 Jan 2018 08:59 )  PTT:33.7 sec  01-11 @ 13:22 BNP: 2433 pg/mL

## 2018-01-11 NOTE — ED PROVIDER NOTE - OBJECTIVE STATEMENT
89 year old male with h/o HTN, hypercholesterolemia, pneumonia, paroxysmal atrial fibrillation and gout presents today sent in from [facility] for respiratory distress, pulse ox- 93% HR-138 and bp-181/93, at the bedside pt's breathing improved with 100% NRB but audible wheezing and rhonchi can be heard +fever

## 2018-01-11 NOTE — PHARMACY COMMUNICATION NOTE - COMMENTS
Md wants this dose and frequency because the trough from [place] came back at 10 and I explained that we do not have access to those troughs and the expected trough would be 17, I suggested 500 q 12h, but MD insisted that the trough from [facility] makes him want to keep the same dose and frequency.

## 2018-01-11 NOTE — CONSULT NOTE ADULT - SUBJECTIVE AND OBJECTIVE BOX
89 y/o white male with hx Paroxysmal A Fib, HTN, HL, Gout, s/p Rt Cataract Surgery, admitted today via the ER from [facility] with SOB initially requiring NRB O2 and found to be febrile to 101.7 with wheezing and rhonchi on physical exam.  W/u with Rapid Influenza testing was done and now reported Positive for Influenza A and 2 Blood Cx's and Urine Cx were obtained and pending.  CXR was done and reported now with a RLL Infiltrate along with trace Pleural effusion.    Recently admitted to [facility] from  to  during which time he was rx'ed for Multifocal Rt PNA.  Patient now c/o cough with only intermittent sputum production since yesterday along with increased SOB and fatigue.  No c/o cp, no abdominal pain, no N/V but c/o soft stools which he cannot always control.  No c/o dysuria or hesitancy, and claims his appetite is fair.       PAST MEDICAL & SURGICAL HISTORY:  Paroxysmal atrial fibrillation  Gout  Pneumonia  Elevated cholesterol  HTN (hypertension)  History of cataract surgery, right  FAMILY HISTORY:  No pertinent family history in first degree relatives  Allergies    aspirin (Stomach Upset)    Intolerances    MEDICATIONS  (STANDING):  ALBUTerol/ipratropium for Nebulization 3 milliLiter(s) Nebulizer every 6 hours  aspirin 325 milliGRAM(s) Oral daily  ATENolol  Tablet 50 milliGRAM(s) Oral daily  atorvastatin 20 milliGRAM(s) Oral at bedtime  azithromycin   Tablet 500 milliGRAM(s) Oral daily  dextrose 5% + sodium chloride 0.9%. 1000 milliLiter(s) (75 mL/Hr) IV Continuous <Continuous>  lactobacillus acidophilus 1 Tablet(s) Oral every 12 hours  meropenem IVPB      meropenem IVPB 1000 milliGRAM(s) IV Intermittent every 8 hours  oseltamivir 30 milliGRAM(s) Oral two times a day  pantoprazole  Injectable 40 milliGRAM(s) IV Push every 12 hours  vancomycin  IVPB 750 milliGRAM(s) IV Intermittent every 12 hours    MEDICATIONS  (PRN):  acetaminophen  Suppository 650 milliGRAM(s) Rectal every 6 hours PRN For Temp greater than 38 C (100.4 F)    ROS  as per HPI, patient not a good historian   Signs Last 24 Hrs  T(C): 36.8 (2018 17:33), Max: 38.7 (2018 08:39)  T(F): 98.2 (2018 17:33), Max: 101.7 (2018 08:39)  HR: 100 (2018 17:33) (100 - 116)  BP: 136/78 (2018 17:33) (121/60 - 136/78)  BP(mean): --  RR: 24 (2018 17:33) (21 - 32)  SpO2: 94% (2018 17:33) (90% - 98%)  Physical Exam  patient lying in bed in no respiratory distress on Nasal Cannula   HEENT - EOMI, PERRLA ,neck supple , No JVD  lungs - decreased BS, has bilateral  wheezing , ronchi or rales   COR- [id number] , no murmer  Abd- soft, BS+, no tenderness, no guarding   ext- ecc neg, good pulses  Neuro - Alert , oriented X3   Skin- No rashes     ABG - ( 2018 10:30 )  pH: x     /  pCO2: 46    /  pO2: 305   / HCO3: 28    / Base Excess: 3.0   /  SaO2: 100             Urinalysis Basic - ( 2018 12:03 )    Color: Yellow / Appearance: Slightly Turbid / S.025 / pH: x  Gluc: x / Ketone: Negative  / Bili: Negative / Urobili: Negative mg/dL   Blood: x / Protein: 30 mg/dL / Nitrite: Negative   Leuk Esterase: Trace / RBC: >50 /HPF / WBC 3-5   Sq Epi: x / Non Sq Epi: Few / Bacteria: Moderate    PT/INR - ( 2018 08:59 )   PT: 13.7 sec;   INR: 1.25 ratio         PTT - ( 2018 08:59 )  PTT:33.7 sec                        13.2   14.5  )-----------( 244      ( 2018 08:59 )             39.1   -11    140  |  103  |  21  ----------------------------<  153<H>  3.9   |  32<H>  |  0.99    Ca    8.1<L>      2018 08:59    TPro  6.7  /  Alb  2.4<L>  /  TBili  0.6  /  DBili  x   /  AST  29  /  ALT  15  /  AlkPhos  63            RVP:   @ 14:05  229E Coronavirus: --           Adenovirus: --           Bordetella Pertussis --           Chlamydia Pneumoniae --           Entero/Rhinovirus --           HKU1 Coronavirus --           hMPV --           Influenza A --           Influenza AH1 --           Influenza AH1  --           Influenza AH3 Detected     Influenza B --           Mycoplasma pneumoniae --           NL63 Coronavirus --           OC43 Coronavirus --           Parainfluenza 1 --           Parainfluenza 2 --           Parainfluenza 3 --           Parainfluenza 4 --           Resp Syncytial Virus --           CXR - RLL infiitrate

## 2018-01-11 NOTE — ED PROVIDER NOTE - CARE PLAN
Principal Discharge DX:	Pneumonia Principal Discharge DX:	Pneumonia  Secondary Diagnosis:	Sepsis Principal Discharge DX:	Pneumonia  Secondary Diagnosis:	Sepsis  Secondary Diagnosis:	Influenza A

## 2018-01-11 NOTE — CONSULT NOTE ADULT - ASSESSMENT
89 y/o white male with hx Paroxysmal A Fib, HTN, HL, Gout, s/p recent LIJVS  admission for Rt Multifocal PNA  from 12/18 - 12/22, s/p trip and fall at [facility] with subsequent Lt Scalp laceration requiring sutures, now admitted with fever, cough, SOB, and fatigue and found to have Influenza A Positive by RVP and new RLL PNA with trace effusion reported along with leukocytosis.    -cont supportive care  -on abx: meropenem / vanco / azithro for PNA  -on Tamiflu for flu  -not on AC (NOACs) for afib secondary to falls; please re-start home asa  -was on atenolol for rate control of Afib; HRs now 110s.  Please restart bbs.  -holding HCTZ during acute event  -can re-start home statin

## 2018-01-11 NOTE — PHARMACY COMMUNICATION NOTE - COMMENTS
s/w dr garcia - aware pt's crcl=44.65ml/min and recommended merrem 1gm q12h, however dr garcia wants to keep merrem 1gm iv q8h due to pt's current status

## 2018-01-11 NOTE — CONSULT NOTE ADULT - ASSESSMENT
RLL Pneumonia - health care associated pneumonia   influenza   Bronchospasm     Recommendations   patient started on vanco , merem and zithromax   on tamiflu   start on duoneb and budesonide   oxygen via Nasal Cannula   f/u oxygen saturation   f/u cultures   f/u CXR in few days   Thank you for this consult  will f/u the patient with you

## 2018-01-11 NOTE — CONSULT NOTE ADULT - SUBJECTIVE AND OBJECTIVE BOX
HPI:  89 y/o white male with hx Paroxysmal A Fib, HTN, Increased Cholesterol, Gout, s/p Rt Cataract Surgery, admitted today via the ER from [facility] with SOB initially requiring NRB O2 and found to be febrile to 101.7 with wheezing and rhonchi on physical exam.  W/u with Rapid Influenza testing was done and now reported Positive for Influenza A and 2 Blood Cx's and Urine Cx were obtained and pending.  CXR was done and reported now with a RLL Infiltrate along with trace Pleural effusion.  Patient is originally from his own home and was admitted to [facility] from  to  during which time he was rx'ed for Multifocal Rt PNA with Zosyn and Vanco and then d/c'ed to [facility] on po Augmentin.  Patient was again seen in the ER on  after he apparently tripped on a telephone wire while at [facility] and sustained a laceration to the Lt Scalp which required sutures and patient was then d/c'ed  back to [facility].  Patient now c/o cough with only intermittent sputum production since yesterday along with increased SOB and fatigue.  No c/o cp, no abdominal pain, no N/V but c/o soft stools which he cannot always control.  No c/o dysuria or hesitancy, and claims his appetite is fair.   Patient was begun empirically on Zosyn and Vanco and rec'd Tamiflu x 1 dose.      Allergies :  aspirin (Stomach Upset)  Intolerances - ASA as above        Social History:  Tobacco: negative x > 70yrs.  Alcohol: negative  Recent Travel: none  Immunizations: Rec'd Influenza Vaccine in the Fall and Pneumovax in the past  Patient lives alone in his own home and ambulates without assistance  Patient's son lives a few miles away  No pets      MEDICATIONS  (STANDING):  dextrose 5% + sodium chloride 0.9%. 1000 milliLiter(s) (75 mL/Hr) IV Continuous <Continuous>  enoxaparin Injectable 80 milliGRAM(s) SubCutaneous daily  pantoprazole  Injectable 40 milliGRAM(s) IV Push every 12 hours  piperacillin/tazobactam IVPB. 3.375 Gram(s) IV Intermittent every 12 hours  vancomycin  IVPB 750 milliGRAM(s) IV Intermittent every 12 hours    MEDICATIONS  (PRN):  acetaminophen  Suppository 650 milliGRAM(s) Rectal every 6 hours PRN For Temp greater than 38 C (100.4 F)        LABS:  CBC Full  -  ( 2018 08:59 )  WBC Count : 14.5 K/uL  Hemoglobin : 13.2 g/dL  Hematocrit : 39.1 %  Platelet Count - Automated : 244 K/uL  Mean Cell Volume : 94.6 fl  Mean Cell Hemoglobin : 31.9 pg  Mean Cell Hemoglobin Concentration : 33.7 gm/dL  Auto Neutrophil # : 12.2 K/uL  Auto Lymphocyte # : 0.7 K/uL  Auto Monocyte # : 1.3 K/uL  Auto Eosinophil # : 0.1 K/uL  Auto Basophil # : 0.1 K/uL  Auto Neutrophil % : 84.6 %  Auto Lymphocyte % : 4.9 %  Auto Monocyte % : 9.3 %  Auto Eosinophil % : 0.4 %  Auto Basophil % : 0.8 %        140  |  103  |  21  ----------------------------<  153<H>  3.9   |  32<H>  |  0.99    Ca    8.1<L>      2018 08:59    TPro  6.7  /  Alb  2.4<L>  /  TBili  0.6  /  DBili  x   /  AST  29  /  ALT  15  /  AlkPhos  63  01-11    LIVER FUNCTIONS - ( 2018 08:59 )  Alb: 2.4 g/dL / Pro: 6.7 gm/dL / ALK PHOS: 63 U/L / ALT: 15 U/L / AST: 29 U/L / GGT: x           PT/INR - ( 2018 08:59 )   PT: 13.7 sec;   INR: 1.25 ratio       PTT - ( 2018 08:59 )  PTT:33.7 sec    Urinalysis Basic - ( 2018 12:03 )  Color: Yellow / Appearance: Slightly Turbid / S.025 / pH: x  Gluc: x / Ketone: Negative  / Bili: Negative / Urobili: Negative mg/dL   Blood: x / Protein: 30 mg/dL / Nitrite: Negative   Leuk Esterase: Trace / RBC: >50 /HPF / WBC 3-5   Sq Epi: x / Non Sq Epi: Few / Bacteria: Moderate      ABG - ( 2018 10:30 )  pH: x     /  pCO2: 46    /  pO2: 305   / HCO3: 28    / Base Excess: 3.0   /  SaO2: 100         Rapid Influenza A - Positive  Rapid Influenza B - Negative    Rapid RVP - pending       Radiology:  CXR  -  < from: Xray Chest 1 View AP- PORTABLE-Urgent (18 @ 09:35) >  INTERPRETATION:  cough    A frontal chest film demonstrates a right lower lobe infiltrate with   atelectasis.    The heart size and vascular markingsare within normal limits for   technique.      There is a trace right pleural effusion .     The osseous structures appear intact intact.     IMPRESSION:  Right lower lobe infiltrate. Trace right pleural effusion.                                CXR -   < from: Xray Chest 1 View AP/PA. (18 @ 13:46) >  A frontal chest film  demonstrates grossly clear lungs.  No acute   infiltrate or consolidation is  noted. The costophrenic anglesare   grossly clear.    The heart size and vascular markings are within normal limits for   technique.      No mediastinal or hilar adenopathy is suggested. .     The osseous structures appear intact intact.     IMPRESSION:  Clear  lungs.  No acute airspace disease suggested.                 Vital Signs Last 24 Hrs  T(C): 36.8 (2018 11:17), Max: 38.7 (2018 08:39)  T(F): 98.2 (2018 11:17), Max: 101.7 (2018 08:39)  HR: 113 (2018 11:17) (107 - 116)  BP: 126/60 (2018 11:17) (121/60 - 134/63)  BP(mean): --  RR: 28 (2018 11:17) (21 - 32)  SpO2: 97% (2018 11:17) (90% - 98%)  Supplemental O2: on NC O2 now      PHYSICAL EXAM    General: awake, alert, elderly white male Ox3, pleasant and cooperative, but uncomfortable with frequent moist cough noted and appears very fatigued and with some SOB increased with coughing  HEENT: conj pink, sclerae anicteric, PERRLA, no oral lesions noted but mucosa and tongue very dry and coated with dried secretions, edentulous  Neck: supple, no nodes noted  Heart: IRREG R, II/VI systolic murmur at LSB and [place]  Lungs: diffuse moist rhonchi with scattered wheeze noted bilaterally  Abdomen: soft, BS +, nontender to palpation, no masses or HS-megaly noted                  no CVA or Spinal tenderness elicited  Extremities: no edema LE's                    no calf tenderness noted  Skin: warm, dry, no rash nosted  Scalp  - Lt upper parietal area with sutures intact - no erythema noted, but some dried eschar along the suture line, nontender to palpation        Impression:   89 y/o white male with hx Paroxysmal A Fib, HTN, elevated Cholesterol, Gout, s/p recent LIJVS  admission for Rt Multifocal PNA  from  - , s/p trip and fall at [facility] with subsequent Lt Scalp laceration requiring sutures, now admitted with fever, cough, SOB, and fatigue and found to have Influenza A Positive by RVP and new RLL PNA with trace effusion reported along with leukocytosis.      Suggestions:  Will therefore rx empirically with Meropenem + Vanco + Zithromax for Healthcare-Acquired Rt PNA and Tamiflu for rx of Acute Influenza A  infection.  Follow-up Blood and urine Cx's as well as Urine Legionella Ag and Chlamydia Pneumoniae Serology. Will request Sputum for Cx as well if patient able to expectorate a  specimen and follow-up temps and labs.  Droplet Precautions for Influenza A.  Follow-up CXR.  Discussed in detail with patient at bedside.  Thanks, will follow with you.

## 2018-01-11 NOTE — ED ADULT NURSE NOTE - NS ED NURSE REPORT GIVEN TO FT
Problem: Patient Care Overview  Goal: Plan of Care Review  Outcome: Ongoing (interventions implemented as appropriate)  Pt remains free from injury or falls. Vital signs stable throughout night on room air. Positions self independently. No complaints of nausea or pain. Telemetry maintained. Pt speaks Occitan but daughter or son at bedside to translate to patient. Daughter at bedside, bed in low locked position and call light within reach.  Will continue to monitor.        DANIEL Trevizo DANIEL Reddy

## 2018-01-12 LAB
-  COAGULASE NEGATIVE STAPHYLOCOCCUS: SIGNIFICANT CHANGE UP
ANION GAP SERPL CALC-SCNC: 7 MMOL/L — SIGNIFICANT CHANGE UP (ref 5–17)
BASOPHILS # BLD AUTO: 0.1 K/UL — SIGNIFICANT CHANGE UP (ref 0–0.2)
BASOPHILS NFR BLD AUTO: 0.7 % — SIGNIFICANT CHANGE UP (ref 0–2)
BUN SERPL-MCNC: 22 MG/DL — SIGNIFICANT CHANGE UP (ref 7–23)
CALCIUM SERPL-MCNC: 8.2 MG/DL — LOW (ref 8.5–10.1)
CHLORIDE SERPL-SCNC: 105 MMOL/L — SIGNIFICANT CHANGE UP (ref 96–108)
CO2 SERPL-SCNC: 30 MMOL/L — SIGNIFICANT CHANGE UP (ref 22–31)
CREAT SERPL-MCNC: 0.79 MG/DL — SIGNIFICANT CHANGE UP (ref 0.5–1.3)
CRP SERPL-MCNC: 10.9 MG/DL — HIGH (ref 0–0.4)
CULTURE RESULTS: NO GROWTH — SIGNIFICANT CHANGE UP
EOSINOPHIL # BLD AUTO: 0 K/UL — SIGNIFICANT CHANGE UP (ref 0–0.5)
EOSINOPHIL NFR BLD AUTO: 0.3 % — SIGNIFICANT CHANGE UP (ref 0–6)
ERYTHROCYTE [SEDIMENTATION RATE] IN BLOOD: 27 MM/HR — HIGH (ref 0–20)
GLUCOSE SERPL-MCNC: 91 MG/DL — SIGNIFICANT CHANGE UP (ref 70–99)
GRAM STN FLD: SIGNIFICANT CHANGE UP
GRAM STN FLD: SIGNIFICANT CHANGE UP
HCT VFR BLD CALC: 37.2 % — LOW (ref 39–50)
HGB BLD-MCNC: 12.5 G/DL — LOW (ref 13–17)
LEGIONELLA AG UR QL: NEGATIVE — SIGNIFICANT CHANGE UP
LYMPHOCYTES # BLD AUTO: 17.5 % — SIGNIFICANT CHANGE UP (ref 13–44)
LYMPHOCYTES # BLD AUTO: 2.1 K/UL — SIGNIFICANT CHANGE UP (ref 1–3.3)
MCHC RBC-ENTMCNC: 31.7 PG — SIGNIFICANT CHANGE UP (ref 27–34)
MCHC RBC-ENTMCNC: 33.7 GM/DL — SIGNIFICANT CHANGE UP (ref 32–36)
MCV RBC AUTO: 94.2 FL — SIGNIFICANT CHANGE UP (ref 80–100)
METHOD TYPE: SIGNIFICANT CHANGE UP
MONOCYTES # BLD AUTO: 1.3 K/UL — HIGH (ref 0–0.9)
MONOCYTES NFR BLD AUTO: 10.8 % — SIGNIFICANT CHANGE UP (ref 2–14)
NEUTROPHILS # BLD AUTO: 8.5 K/UL — HIGH (ref 1.8–7.4)
NEUTROPHILS NFR BLD AUTO: 70.7 % — SIGNIFICANT CHANGE UP (ref 43–77)
PLATELET # BLD AUTO: 195 K/UL — SIGNIFICANT CHANGE UP (ref 150–400)
POTASSIUM SERPL-MCNC: 4 MMOL/L — SIGNIFICANT CHANGE UP (ref 3.5–5.3)
POTASSIUM SERPL-SCNC: 4 MMOL/L — SIGNIFICANT CHANGE UP (ref 3.5–5.3)
PROCALCITONIN SERPL-MCNC: 0.68 NG/ML — HIGH (ref 0–0.04)
RBC # BLD: 3.95 M/UL — LOW (ref 4.2–5.8)
RBC # FLD: 12.4 % — SIGNIFICANT CHANGE UP (ref 11–15)
SODIUM SERPL-SCNC: 142 MMOL/L — SIGNIFICANT CHANGE UP (ref 135–145)
SPECIMEN SOURCE: SIGNIFICANT CHANGE UP
VANCOMYCIN TROUGH SERPL-MCNC: 13.5 UG/ML — SIGNIFICANT CHANGE UP (ref 10–20)
WBC # BLD: 12 K/UL — HIGH (ref 3.8–10.5)
WBC # FLD AUTO: 12 K/UL — HIGH (ref 3.8–10.5)

## 2018-01-12 PROCEDURE — 99232 SBSQ HOSP IP/OBS MODERATE 35: CPT

## 2018-01-12 RX ORDER — ASPIRIN/CALCIUM CARB/MAGNESIUM 324 MG
81 TABLET ORAL DAILY
Qty: 0 | Refills: 0 | Status: DISCONTINUED | OUTPATIENT
Start: 2018-01-12 | End: 2018-02-13

## 2018-01-12 RX ORDER — ENOXAPARIN SODIUM 100 MG/ML
70 INJECTION SUBCUTANEOUS DAILY
Qty: 0 | Refills: 0 | Status: DISCONTINUED | OUTPATIENT
Start: 2018-01-12 | End: 2018-01-12

## 2018-01-12 RX ADMIN — ATENOLOL 50 MILLIGRAM(S): 25 TABLET ORAL at 05:37

## 2018-01-12 RX ADMIN — Medication 81 MILLIGRAM(S): at 16:45

## 2018-01-12 RX ADMIN — Medication 0.5 MILLIGRAM(S): at 17:19

## 2018-01-12 RX ADMIN — Medication 0.5 MILLIGRAM(S): at 06:06

## 2018-01-12 RX ADMIN — Medication 3 MILLILITER(S): at 17:16

## 2018-01-12 RX ADMIN — MEROPENEM 100 MILLIGRAM(S): 1 INJECTION INTRAVENOUS at 05:37

## 2018-01-12 RX ADMIN — Medication 30 MILLIGRAM(S): at 02:04

## 2018-01-12 RX ADMIN — ATORVASTATIN CALCIUM 20 MILLIGRAM(S): 80 TABLET, FILM COATED ORAL at 21:52

## 2018-01-12 RX ADMIN — Medication 1 TABLET(S): at 05:37

## 2018-01-12 RX ADMIN — SODIUM CHLORIDE 75 MILLILITER(S): 9 INJECTION, SOLUTION INTRAVENOUS at 02:04

## 2018-01-12 RX ADMIN — Medication 150 MILLIGRAM(S): at 18:48

## 2018-01-12 RX ADMIN — AZITHROMYCIN 500 MILLIGRAM(S): 500 TABLET, FILM COATED ORAL at 11:44

## 2018-01-12 RX ADMIN — MEROPENEM 100 MILLIGRAM(S): 1 INJECTION INTRAVENOUS at 16:45

## 2018-01-12 RX ADMIN — PANTOPRAZOLE SODIUM 40 MILLIGRAM(S): 20 TABLET, DELAYED RELEASE ORAL at 18:48

## 2018-01-12 RX ADMIN — Medication 3 MILLILITER(S): at 06:02

## 2018-01-12 RX ADMIN — Medication 3 MILLILITER(S): at 01:57

## 2018-01-12 RX ADMIN — PANTOPRAZOLE SODIUM 40 MILLIGRAM(S): 20 TABLET, DELAYED RELEASE ORAL at 05:36

## 2018-01-12 RX ADMIN — Medication 30 MILLIGRAM(S): at 11:45

## 2018-01-12 RX ADMIN — Medication 3 MILLILITER(S): at 11:16

## 2018-01-12 RX ADMIN — SODIUM CHLORIDE 75 MILLILITER(S): 9 INJECTION, SOLUTION INTRAVENOUS at 18:48

## 2018-01-12 RX ADMIN — Medication 150 MILLIGRAM(S): at 05:37

## 2018-01-12 RX ADMIN — MEROPENEM 100 MILLIGRAM(S): 1 INJECTION INTRAVENOUS at 21:51

## 2018-01-12 NOTE — PROGRESS NOTE ADULT - SUBJECTIVE AND OBJECTIVE BOX
Patient is a 89y old  Male who presents with a chief complaint of     INTERVAL HPI/OVERNIGHT EVENTS: much improved; Dx c influenza, bacteremia    MEDICATIONS  (STANDING):  ALBUTerol/ipratropium for Nebulization 3 milliLiter(s) Nebulizer every 6 hours  aspirin 325 milliGRAM(s) Oral daily  ATENolol  Tablet 50 milliGRAM(s) Oral daily  atorvastatin 20 milliGRAM(s) Oral at bedtime  azithromycin   Tablet 500 milliGRAM(s) Oral daily  buDESOnide   0.5 milliGRAM(s) Respule 0.5 milliGRAM(s) Inhalation every 12 hours  dextrose 5% + sodium chloride 0.9%. 1000 milliLiter(s) (75 mL/Hr) IV Continuous <Continuous>  lactobacillus acidophilus 1 Tablet(s) Oral every 12 hours  meropenem IVPB      meropenem IVPB 1000 milliGRAM(s) IV Intermittent every 8 hours  oseltamivir 30 milliGRAM(s) Oral two times a day  pantoprazole  Injectable 40 milliGRAM(s) IV Push every 12 hours  vancomycin  IVPB 750 milliGRAM(s) IV Intermittent every 12 hours    MEDICATIONS  (PRN):  acetaminophen  Suppository 650 milliGRAM(s) Rectal every 6 hours PRN For Temp greater than 38 C (100.4 F)      Allergies    aspirin (Stomach Upset)    Intolerances        REVIEW OF SYSTEMS:        HEENT - wnl  RESPIRATORY: cough sob  CARDIOVASCULAR: No chest pain, palpitations, dizziness, or leg swelling  GASTROINTESTINAL: No abdominal or epigastric pain. No nausea, vomiting, or hematemesis; No diarrhea or constipation. No melena or hematochezia.  GENITOURINARY: No dysuria, frequency, hematuria, or incontinence  SKIN: No itching, burning, rashes, or lesions   MUSCULOSKELETAL: weakness  PSYCHIATRIC: No depression, anxiety, mood swings, or difficulty sleeping        Vital Signs Last 24 Hrs  T(C): 36.2 (2018 04:54), Max: 36.8 (2018 11:17)  T(F): 97.2 (2018 04:54), Max: 98.2 (2018 11:17)  HR: 77 (2018 06:08) (74 - 118)  BP: 156/72 (2018 04:54) (109/48 - 170/89)  BP(mean): --  RR: 20 (2018 04:54) (20 - 28)  SpO2: 99% (2018 06:08) (92% - 99%)    PHYSICAL EXAM:  general       HEENT wnl  CHEST/LUNG: rhonchi anteriorly, < BS at bases  HEART: Regular rate and rhythm; No murmurs, rubs, or gallops  ABDOMEN: Soft, Nontender, Nondistended; Bowel sounds present  EXTREMITIES:  2+ Peripheral Pulses, No clubbing, cyanosis, or edema  LYMPH: No lymphadenopathy noted  SKIN: No rashes or lesions    LABS: G pos cocci in clusters in blood reported                        12.5   12.0  )-----------( 195      ( 2018 06:19 )             37.2     01-12    142  |  105  |  22  ----------------------------<  91  4.0   |  30  |  0.79    Ca    8.2<L>      2018 06:19    TPro  6.7  /  Alb  2.4<L>  /  TBili  0.6  /  DBili  x   /  AST  29  /  ALT  15  /  AlkPhos  63  01-11    PT/INR - ( 2018 08:59 )   PT: 13.7 sec;   INR: 1.25 ratio         PTT - ( 2018 08:59 )  PTT:33.7 sec  Urinalysis Basic - ( 2018 12:03 )    Color: Yellow / Appearance: Slightly Turbid / S.025 / pH: x  Gluc: x / Ketone: Negative  / Bili: Negative / Urobili: Negative mg/dL   Blood: x / Protein: 30 mg/dL / Nitrite: Negative   Leuk Esterase: Trace / RBC: >50 /HPF / WBC 3-5   Sq Epi: x / Non Sq Epi: Few / Bacteria: Moderate      CAPILLARY BLOOD GLUCOSE      POCT Blood Glucose.: 130 mg/dL (2018 21:30)    ABG - ( 2018 10:30 )  pH: x     /  pCO2: 46    /  pO2: 305   / HCO3: 28    / Base Excess: 3.0   /  SaO2: 100                         RADIOLOGY & ADDITIONAL TESTS:    Imaging Personally Reviewed:  [y ] YES  [ ] NO    Consultant(s) Notes Reviewed:  [y ] YES  [ ] NO    Care Discussed with Consultants/Other Providers [ ] YES  [ ] NO    PROBLEMS:  PNEUMONIA, SEPSIS  DIFFICULTY BREATHING  Pneumonia  Respiratory Fx  Influenza  Bacteremia/Sepsis      Care discussed with family,         [  ]   yes  [  ]  No    imp:    stable[ ]    unstable[ v     improving [  v ]       unchanged  [  ]                Plans:  Continue present plans  [ v ] swallow reevaluation, the start diet

## 2018-01-12 NOTE — PROGRESS NOTE ADULT - SUBJECTIVE AND OBJECTIVE BOX
Vital Signs Last 24 Hrs  T(C): 36.4 (2018 18:07), Max: 36.4 (2018 23:55)  T(F): 97.6 (2018 18:07), Max: 97.6 (2018 23:55)  HR: 74 (2018 18:07) (69 - 118)  BP: 136/50 (2018 18:07) (109/48 - 170/89)  BP(mean): --  RR: 18 (2018 18:07) (16 - 28)  SpO2: 98% (2018 18:07) (92% - 99%)  Physical Exam  patient sitting in chair confused  in no respiratory distress  HEENT - EOMI, PERRLA ,neck supple , No JVD  lungs - decreased BS, no wheezing , ronchi or rales   COR- [id number] , no murmer  Abd- soft, BS+, no tenderness, no guarding   ext- ecc neg, good pulses  Neuro - Alert , oriented X3   Skin- No rashes   MEDICATIONS  (STANDING):  ALBUTerol/ipratropium for Nebulization 3 milliLiter(s) Nebulizer every 6 hours  aspirin enteric coated 81 milliGRAM(s) Oral daily  ATENolol  Tablet 50 milliGRAM(s) Oral daily  atorvastatin 20 milliGRAM(s) Oral at bedtime  azithromycin   Tablet 500 milliGRAM(s) Oral daily  buDESOnide   0.5 milliGRAM(s) Respule 0.5 milliGRAM(s) Inhalation every 12 hours  dextrose 5% + sodium chloride 0.9%. 1000 milliLiter(s) (75 mL/Hr) IV Continuous <Continuous>  lactobacillus acidophilus 1 Tablet(s) Oral every 12 hours  meropenem IVPB      meropenem IVPB 1000 milliGRAM(s) IV Intermittent every 8 hours  oseltamivir 30 milliGRAM(s) Oral two times a day  pantoprazole  Injectable 40 milliGRAM(s) IV Push every 12 hours  vancomycin  IVPB 750 milliGRAM(s) IV Intermittent every 12 hours    MEDICATIONS  (PRN):  acetaminophen  Suppository 650 milliGRAM(s) Rectal every 6 hours PRN For Temp greater than 38 C (100.4 F)  ABG - ( 2018 10:30 )  pH: x     /  pCO2: 46    /  pO2: 305   / HCO3: 28    / Base Excess: 3.0   /  SaO2: 100                                   12.5   12.0  )-----------( 195      ( 2018 06:19 )             37.2   -12    142  |  105  |  22  ----------------------------<  91  4.0   |  30  |  0.79    Ca    8.2<L>      2018 06:19    TPro  6.7  /  Alb  2.4<L>  /  TBili  0.6  /  DBili  x   /  AST  29  /  ALT  15  /  AlkPhos  63  01-11    Urinalysis Basic - ( 2018 12:03 )    Color: Yellow / Appearance: Slightly Turbid / S.025 / pH: x  Gluc: x / Ketone: Negative  / Bili: Negative / Urobili: Negative mg/dL   Blood: x / Protein: 30 mg/dL / Nitrite: Negative   Leuk Esterase: Trace / RBC: >50 /HPF / WBC 3-5   Sq Epi: x / Non Sq Epi: Few / Bacteria: Moderate            RVP:   @ 14:05  229E Coronavirus: --           Adenovirus: --           Bordetella Pertussis --           Chlamydia Pneumoniae --           Entero/Rhinovirus --           HKU1 Coronavirus --           hMPV --           Influenza A --           Influenza AH1 --           Influenza AH1  --           Influenza AH3 Detected     Influenza B --           Mycoplasma pneumoniae --           NL63 Coronavirus --           OC43 Coronavirus --           Parainfluenza 1 --           Parainfluenza 2 --           Parainfluenza 3 --           Parainfluenza 4 --           Resp Syncytial Virus --

## 2018-01-12 NOTE — PROGRESS NOTE ADULT - ASSESSMENT
influenza   RLL Pneumonia - health care associated pneumonia   bronchospasm - improved     Recommendations   continue broad spectrum Antibiotics as per ID   on tamiflu   bronchodilators   monitor oxygen saturation   repeat CXR in few days

## 2018-01-12 NOTE — CHART NOTE - NSCHARTNOTEFT_GEN_A_CORE
Hospitalist Medicine NP    CC: "burgundy color urine 100 ml" as per RN    HPI:89 y/o white male with hx Paroxysmal A Fib, HTN, HL, Gout, s/p Rt Cataract Surgery, admitted today via the ER from [facility] with SOB initially requiring NRB O2 and found to be febrile to 101.7 with wheezing and rhonchi on physical exam.  W/u with Rapid Influenza testing was done and now reported Positive for Influenza A and 2 Blood Cx's and Urine Cx were obtained and pending.  CXR was done and reported now with a RLL Infiltrate along with trace Pleural effusion.    Recently admitted to [facility] from 12/18 to 12/22 during which time he was rx'ed for Multifocal Rt PNA.  Patient now c/o cough with only intermittent sputum production since yesterday along with increased SOB and fatigue.  No c/o cp, no abdominal pain, no N/V but c/o soft stools which he cannot always control.  No c/o dysuria or hesitancy, and claims his appetite is fair.   Patient was begun empirically on Zosyn and Vanco and rec'd Tamiflu x 1 dose.    seen for hematuria X 1 episode    Vital Signs Last 24 Hrs  T(C): 36.2 (12 Jan 2018 11:20), Max: 36.8 (11 Jan 2018 17:33)  T(F): 97.2 (12 Jan 2018 11:20), Max: 98.2 (11 Jan 2018 17:33)  HR: 69 (12 Jan 2018 11:47) (69 - 118)  BP: 132/63 (12 Jan 2018 11:20) (109/48 - 170/89)  BP(mean): --  RR: 16 (12 Jan 2018 11:20) (16 - 28)  SpO2: 98% (12 Jan 2018 11:47) (92% - 99%)    REVIEW OF SYSTEMS:    RESPIRATORY: occasional shortness of breath  CARDIOVASCULAR: No chest pain, palpitations, dizziness, or leg swelling  GASTROINTESTINAL: No abdominal or epigastric pain. No nausea, vomiting, or hematemesis; No diarrhea or constipation. No melena or hematochezia.  GENITOURINARY: c/o hematuria        PHYSICAL EXAM:    GENERAL: NAD, well-groomed, well-developed  NERVOUS SYSTEM:  Alert & Oriented X3, Good concentration for his age  CHEST/LUNG: scattered rhonchi, wheezing,   HEART: Regular rate and rhythm; No murmurs, rubs, or gallops  ABDOMEN: Soft, Nontender, Nondistended; Bowel sounds present  EXTREMITIES:  2+ Peripheral Pulses, No clubbing, cyanosis, or edema    Assessment: Patient 89y with Pmhx Paroxysmal atrial fibrillation, Gout, Pneumonia, Elevated cholesterol, HTN (hypertension) admitted with PNEUMONIA, SEPSIS  DIFFICULTY BREATHING, Paroxysmal atrial fibrillation, Gout, Pneumonia, Elevated cholesterol, HTN (hypertension)      Plan:  - d/c lovenox and maybe start ASA 81 mg daily??  - follow up labs  - Will D/w Dr. See  - will continue to follow up

## 2018-01-12 NOTE — PROGRESS NOTE ADULT - SUBJECTIVE AND OBJECTIVE BOX
HPI:  89 y/o white male with hx Paroxysmal A Fib, HTN, HL, Gout, s/p Rt Cataract Surgery, admitted today via the ER from [facility] with SOB initially requiring NRB O2 and found to be febrile to 101.7 with wheezing and rhonchi on physical exam.  W/u with Rapid Influenza testing was done and now reported Positive for Influenza A and 2 Blood Cx's and Urine Cx were obtained and pending.  CXR was done and reported now with a RLL Infiltrate along with trace Pleural effusion.    Recently admitted to [facility] from 12/18 to 12/22 during which time he was rx'ed for Multifocal Rt PNA.  Patient now c/o cough with only intermittent sputum production since yesterday along with increased SOB and fatigue.  No c/o cp, no abdominal pain, no N/V but c/o soft stools which he cannot always control.  No c/o dysuria or hesitancy, and claims his appetite is fair.   Patient was begun empirically on Zosyn and Vanco and rec'd Tamiflu x 1 dose.    REVIEW OF SYSTEMS:  CONSTITUTIONAL: + fever and fatigue  EYES: No eye pain, visual disturbances, or discharge  ENMT:  No difficulty hearing, tinnitus, vertigo; No sinus or throat pain  NECK: No pain or stiffness  BREASTS: No pain, masses, or nipple discharge  RESPIRATORY: +cough / wheezing / shortness of breath  CARDIOVASCULAR: No chest pain, palpitations, dizziness, or leg swelling  GASTROINTESTINAL: No abdominal or epigastric pain. No nausea, vomiting, or hematemesis; No diarrhea or constipation. No melena or hematochezia.  GENITOURINARY: No dysuria, frequency, hematuria, or incontinence  NEUROLOGICAL: No headaches, memory loss, loss of strength, numbness, or tremors  SKIN: No itching, burning, rashes, or lesions   LYMPH NODES: No enlarged glands  ENDOCRINE: No heat or cold intolerance; No hair loss  MUSCULOSKELETAL: No joint pain or swelling; No muscle, back, or extremity pain  PSYCHIATRIC: No depression, anxiety, mood swings, or difficulty sleeping  HEME/LYMPH: No easy bruising, or bleeding gums  ALLERGY AND IMMUNOLOGIC: No hives or eczema      PHYSICAL EXAMINATION:  -----------------------------  Vital Signs Last 24 Hrs  T(C): 36.2 (12 Jan 2018 11:20), Max: 36.8 (11 Jan 2018 17:33)  T(F): 97.2 (12 Jan 2018 11:20), Max: 98.2 (11 Jan 2018 17:33)  HR: 69 (12 Jan 2018 11:47) (69 - 118)  BP: 132/63 (12 Jan 2018 11:20) (109/48 - 170/89)  RR: 16 (12 Jan 2018 11:20) (16 - 28)  SpO2: 98% (12 Jan 2018 11:47) (92% - 99%)    Constitutional: mild respiratory distress.   Eyes: the conjunctiva exhibited no abnormalities and the eyelids demonstrated no xanthelasmas.   HEENT: normal oral mucosa, no oral pallor and no oral cyanosis.   Neck: normal jugular venous A waves present, normal jugular venous V waves present and no jugular venous kamara A waves.   Pulmonary: coarse BS bilaterally; decreased at the bases; diffuse wheezing  Cardiovascular: heart rate and rhythm were normal, normal S1 and S2 and no murmur, gallop, rub, heave or thrill are present.   Abdomen: soft, non-tender, no hepato-splenomegaly and no abdominal mass palpated.   Musculoskeletal: the gait could not be assessed..   Extremities: no clubbing of the fingernails, no localized cyanosis, no petechial hemorrhages and no ischemic changes.   Skin: normal skin color and pigmentation, no rash, no venous stasis, no skin lesions, no skin ulcer and no xanthoma was observed.   Psychiatric: oriented to person, place, and time, the affect was normal, the mood was normal and not feeling anxious.     LABS:   --------                          12.5   12.0  )-----------( 195      ( 12 Jan 2018 06:19 )             37.2       01-12    142  |  105  |  22  ----------------------------<  91  4.0   |  30  |  0.79    Ca    8.2<L>      12 Jan 2018 06:19    TPro  6.7  /  Alb  2.4<L>  /  TBili  0.6  /  DBili  x   /  AST  29  /  ALT  15  /  AlkPhos  63  01-11

## 2018-01-13 LAB
ANION GAP SERPL CALC-SCNC: 5 MMOL/L — SIGNIFICANT CHANGE UP (ref 5–17)
BUN SERPL-MCNC: 17 MG/DL — SIGNIFICANT CHANGE UP (ref 7–23)
CALCIUM SERPL-MCNC: 8.1 MG/DL — LOW (ref 8.5–10.1)
CHLORIDE SERPL-SCNC: 106 MMOL/L — SIGNIFICANT CHANGE UP (ref 96–108)
CO2 SERPL-SCNC: 32 MMOL/L — HIGH (ref 22–31)
CREAT SERPL-MCNC: 0.66 MG/DL — SIGNIFICANT CHANGE UP (ref 0.5–1.3)
CULTURE RESULTS: SIGNIFICANT CHANGE UP
GLUCOSE SERPL-MCNC: 105 MG/DL — HIGH (ref 70–99)
GRAM STN FLD: SIGNIFICANT CHANGE UP
HCT VFR BLD CALC: 38.2 % — LOW (ref 39–50)
HGB BLD-MCNC: 12.3 G/DL — LOW (ref 13–17)
MCHC RBC-ENTMCNC: 30.8 PG — SIGNIFICANT CHANGE UP (ref 27–34)
MCHC RBC-ENTMCNC: 32.3 GM/DL — SIGNIFICANT CHANGE UP (ref 32–36)
MCV RBC AUTO: 95.3 FL — SIGNIFICANT CHANGE UP (ref 80–100)
ORGANISM # SPEC MICROSCOPIC CNT: SIGNIFICANT CHANGE UP
ORGANISM # SPEC MICROSCOPIC CNT: SIGNIFICANT CHANGE UP
PLATELET # BLD AUTO: 212 K/UL — SIGNIFICANT CHANGE UP (ref 150–400)
POTASSIUM SERPL-MCNC: 3.7 MMOL/L — SIGNIFICANT CHANGE UP (ref 3.5–5.3)
POTASSIUM SERPL-SCNC: 3.7 MMOL/L — SIGNIFICANT CHANGE UP (ref 3.5–5.3)
RBC # BLD: 4.01 M/UL — LOW (ref 4.2–5.8)
RBC # FLD: 12.7 % — SIGNIFICANT CHANGE UP (ref 11–15)
SODIUM SERPL-SCNC: 143 MMOL/L — SIGNIFICANT CHANGE UP (ref 135–145)
SPECIMEN SOURCE: SIGNIFICANT CHANGE UP
WBC # BLD: 10.3 K/UL — SIGNIFICANT CHANGE UP (ref 3.8–10.5)
WBC # FLD AUTO: 10.3 K/UL — SIGNIFICANT CHANGE UP (ref 3.8–10.5)

## 2018-01-13 PROCEDURE — 99231 SBSQ HOSP IP/OBS SF/LOW 25: CPT

## 2018-01-13 RX ADMIN — MEROPENEM 100 MILLIGRAM(S): 1 INJECTION INTRAVENOUS at 21:48

## 2018-01-13 RX ADMIN — ATENOLOL 50 MILLIGRAM(S): 25 TABLET ORAL at 05:38

## 2018-01-13 RX ADMIN — Medication 0.5 MILLIGRAM(S): at 06:19

## 2018-01-13 RX ADMIN — Medication 150 MILLIGRAM(S): at 06:51

## 2018-01-13 RX ADMIN — Medication 150 MILLIGRAM(S): at 18:19

## 2018-01-13 RX ADMIN — PANTOPRAZOLE SODIUM 40 MILLIGRAM(S): 20 TABLET, DELAYED RELEASE ORAL at 05:38

## 2018-01-13 RX ADMIN — Medication 3 MILLILITER(S): at 12:13

## 2018-01-13 RX ADMIN — PANTOPRAZOLE SODIUM 40 MILLIGRAM(S): 20 TABLET, DELAYED RELEASE ORAL at 18:19

## 2018-01-13 RX ADMIN — Medication 0.5 MILLIGRAM(S): at 17:43

## 2018-01-13 RX ADMIN — MEROPENEM 100 MILLIGRAM(S): 1 INJECTION INTRAVENOUS at 05:37

## 2018-01-13 RX ADMIN — Medication 3 MILLILITER(S): at 06:19

## 2018-01-13 RX ADMIN — Medication 200 MILLIGRAM(S): at 18:20

## 2018-01-13 RX ADMIN — Medication 200 MILLIGRAM(S): at 13:44

## 2018-01-13 RX ADMIN — Medication 81 MILLIGRAM(S): at 11:50

## 2018-01-13 RX ADMIN — Medication 200 MILLIGRAM(S): at 21:48

## 2018-01-13 RX ADMIN — Medication 3 MILLILITER(S): at 17:43

## 2018-01-13 RX ADMIN — ATORVASTATIN CALCIUM 20 MILLIGRAM(S): 80 TABLET, FILM COATED ORAL at 21:48

## 2018-01-13 RX ADMIN — Medication 1 TABLET(S): at 05:38

## 2018-01-13 RX ADMIN — Medication 3 MILLILITER(S): at 00:54

## 2018-01-13 RX ADMIN — Medication 30 MILLIGRAM(S): at 11:50

## 2018-01-13 RX ADMIN — SODIUM CHLORIDE 75 MILLILITER(S): 9 INJECTION, SOLUTION INTRAVENOUS at 12:40

## 2018-01-13 RX ADMIN — Medication 30 MILLIGRAM(S): at 01:49

## 2018-01-13 RX ADMIN — AZITHROMYCIN 500 MILLIGRAM(S): 500 TABLET, FILM COATED ORAL at 11:50

## 2018-01-13 RX ADMIN — MEROPENEM 100 MILLIGRAM(S): 1 INJECTION INTRAVENOUS at 13:44

## 2018-01-13 RX ADMIN — Medication 30 MILLIGRAM(S): at 23:59

## 2018-01-13 RX ADMIN — Medication 1 TABLET(S): at 18:20

## 2018-01-13 NOTE — SWALLOW BEDSIDE ASSESSMENT ADULT - SWALLOW EVAL: DIAGNOSIS
Pt is a 89 yr old male p/w RLL infiltrate and influenza A positive; Pt with labored breathing and changes during swallow and for duration of meal; A Modified Barium Swallow exam done 1-4-18 revealed penetration into the airway of thin liquids; full SLP report to be obtained.

## 2018-01-13 NOTE — SWALLOW BEDSIDE ASSESSMENT ADULT - SLP GENERAL OBSERVATIONS
Pt seen at bedside with son present for exam. Pt appeared with labored breathing and in weakened state; verbal output with confabulations and unintelligible.

## 2018-01-13 NOTE — SWALLOW BEDSIDE ASSESSMENT ADULT - COMMENTS
CXR - RLL infiltrate;  Influenza A pos;  Recently admitted [facility] 12/18 to 12/22 for Multifocal Rt PNA;

## 2018-01-13 NOTE — SWALLOW BEDSIDE ASSESSMENT ADULT - SLP PERTINENT HISTORY OF CURRENT PROBLEM
admitted from [facility] with SOB initially requiring NRB O2 and found to be febrile to 101.7 with wheezing and rhonchi on physical exam.

## 2018-01-13 NOTE — SWALLOW BEDSIDE ASSESSMENT ADULT - ASR SWALLOW RECOMMEND DIAG
VFSS/MBS/completed [facility] on 1-4-18; SLP report pending completed [facility] on 1-4-18; [facility] SLP report pending/VFSS/MBS

## 2018-01-13 NOTE — SWALLOW BEDSIDE ASSESSMENT ADULT - SWALLOW EVAL: RECOMMENDED FEEDING/EATING TECHNIQUES
maintain upright posture during/after eating for 30 mins/alternate food with liquid/allow for swallow between intakes/check mouth frequently for oral residue/pocketing/no straws/position upright (90 degrees)/small sips/bites/oral hygiene

## 2018-01-13 NOTE — PROGRESS NOTE ADULT - ASSESSMENT
89 y/o white male with hx Paroxysmal A Fib, HTN, HLD, Gout, s/p recent [facility]  admission for Rt Multifocal PNA  from 12/18 - 12/22, s/p trip and fall at [facility] with subsequent Lt Scalp laceration requiring sutures, now admitted with fever, cough, SOB, and fatigue and found to have Influenza A Positive by RVP and new RLL PNA with trace effusion reported along with leukocytosis.    Con't with:    MEDICATIONS  (STANDING):  ALBUTerol/ipratropium for Nebulization 3 milliLiter(s) Nebulizer every 6 hours  aspirin enteric coated 81 milliGRAM(s) Oral daily  ATENolol  Tablet 50 milliGRAM(s) Oral daily  atorvastatin 20 milliGRAM(s) Oral at bedtime  azithromycin   Tablet 500 milliGRAM(s) Oral daily  buDESOnide   0.5 milliGRAM(s) Respule 0.5 milliGRAM(s) Inhalation every 12 hours  dextrose 5% + sodium chloride 0.9%. 1000 milliLiter(s) (75 mL/Hr) IV Continuous <Continuous>  guaiFENesin    Syrup 200 milliGRAM(s) Oral every 4 hours  lactobacillus acidophilus 1 Tablet(s) Oral every 12 hours    meropenem IVPB 1000 milliGRAM(s) IV Intermittent every 8 hours  oseltamivir 30 milliGRAM(s) Oral two times a day  pantoprazole  Injectable 40 milliGRAM(s) IV Push every 12 hours  vancomycin  IVPB 750 milliGRAM(s) IV Intermittent every 12 hours    Can d/c tele.  Supportive care.  Signing off. please call back prn.    Brennan Davies MD, FACC 89 y/o white male with hx Paroxysmal A Fib, HTN, HLD, Gout, s/p recent [facility]  admission for Rt Multifocal PNA  from 12/18 - 12/22, s/p trip and fall at [facility] with subsequent Lt Scalp laceration requiring sutures, now admitted with fever, cough, SOB, and fatigue and found to have Influenza A Positive by RVP and new RLL PNA with trace effusion reported along with leukocytosis.    Con't with:    MEDICATIONS  (STANDING):  ALBUTerol/ipratropium for Nebulization 3 milliLiter(s) Nebulizer every 6 hours  aspirin enteric coated 81 milliGRAM(s) Oral daily  ATENolol  Tablet 50 milliGRAM(s) Oral daily  atorvastatin 20 milliGRAM(s) Oral at bedtime  azithromycin   Tablet 500 milliGRAM(s) Oral daily  buDESOnide   0.5 milliGRAM(s) Respule 0.5 milliGRAM(s) Inhalation every 12 hours  dextrose 5% + sodium chloride 0.9%. 1000 milliLiter(s) (75 mL/Hr) IV Continuous <Continuous>  guaiFENesin    Syrup 200 milliGRAM(s) Oral every 4 hours  lactobacillus acidophilus 1 Tablet(s) Oral every 12 hours    meropenem IVPB 1000 milliGRAM(s) IV Intermittent every 8 hours  oseltamivir 30 milliGRAM(s) Oral two times a day  pantoprazole  Injectable 40 milliGRAM(s) IV Push every 12 hours  vancomycin  IVPB 750 milliGRAM(s) IV Intermittent every 12 hours    Can d/c tele.  Supportive care.  Signing off. please call back prn.  No A/C now.    Brennan Davies MD, FACC

## 2018-01-13 NOTE — PROGRESS NOTE ADULT - SUBJECTIVE AND OBJECTIVE BOX
HPI:  89 y/o white male with hx Paroxysmal A Fib, HTN, HL, Gout, s/p Rt Cataract Surgery, [facility] initially requiring NRB O2 and found to be febrile to 101.7 with wheezing and rhonchi on physical exam.  W/u with Rapid Influenza testing was done and now reported Positive for Influenza A and 2 Blood Cx's and Urine Cx were obtained and pending.  CXR was done and reported now with a RLL Infiltrate along with trace Pleural effusion.    Recently admitted to [facility] from 12/18 to 12/22 during which time he was rx'ed for Multifocal Rt PNA.  Patient now c/o cough with only intermittent sputum production since yesterday along with increased SOB and fatigue.  No c/o cp, no abdominal pain, no N/V but c/o soft stools which he cannot always control.  No c/o dysuria or hesitancy, and claims his appetite is fair.   Patient was begun empirically on Zosyn and Vanco and rec'd Tamiflu x 1 dose.  Resting comfortably now.    REVIEW OF SYSTEMS:  Unable as he is confused    PHYSICAL EXAMINATION:  -----------------------------  Vital Signs Last 24 Hrs  T(C): 36.3 (13 Jan 2018 11:40), Max: 36.9 (13 Jan 2018 00:14)  T(F): 97.4 (13 Jan 2018 11:40), Max: 98.4 (13 Jan 2018 00:14)  HR: 50 (13 Jan 2018 12:59) (48 - 119)  BP: 125/54 (13 Jan 2018 11:40) (125/54 - 171/78)  RR: 20 (13 Jan 2018 11:40) (18 - 20)  SpO2: 95% (13 Jan 2018 12:59) (94% - 99%)    Tele: SR, PACs  Constitutional: mild respiratory distress.   Eyes: the conjunctiva exhibited no abnormalities and the eyelids demonstrated no xanthelasmas.   HEENT: normal oral mucosa, no oral pallor and no oral cyanosis.   Neck: normal jugular venous A waves present, normal jugular venous V waves present and no jugular venous kamara A waves.   Pulmonary: coarse BS bilaterally; decreased at the bases; diffuse wheezing  Cardiovascular: heart rate and rhythm were normal, normal S1 and S2 and no murmur, gallop, rub, heave or thrill are present.   Abdomen: soft, non-tender, no hepato-splenomegaly and no abdominal mass palpated.   Musculoskeletal: the gait could not be assessed..   Extremities: no clubbing of the fingernails, no localized cyanosis, no petechial hemorrhages and no ischemic changes.   Skin: normal skin color and pigmentation, no rash, no venous stasis, no skin lesions, no skin ulcer and no xanthoma was observed.   Psychiatric: oriented to person, place, and time, the affect was normal, the mood was normal and not feeling anxious.     LABS:   --------                          12.3   10.3  )-----------( 212      ( 13 Jan 2018 06:18 )             38.2       01-13    143  |  106  |  17  ----------------------------<  105<H>  3.7   |  32<H>  |  0.66    Ca    8.1<L>      13 Jan 2018 06:18

## 2018-01-13 NOTE — SWALLOW BEDSIDE ASSESSMENT ADULT - ASR SWALLOW ASPIRATION MONITOR
gurgly voice/change of breathing pattern/cough/throat clearing fever/throat clearing/change of breathing pattern/cough/gurgly voice

## 2018-01-13 NOTE — H&P ADULT - NSHPPHYSICALEXAM_GEN_ALL_CORE
HEENT - erythema all mucous membranes, edema  Lungs << BS at R base, rhonchi coarse anteriouly  S1S2 ireg. irregular  Abd soft no organomegaly  Extr no c/e  Neuro - periph neuropathy

## 2018-01-13 NOTE — SWALLOW BEDSIDE ASSESSMENT ADULT - ORAL PREPARATORY PHASE
Within functional limits Anterior loss of bolus/Decreased mastication ability/Bolus falls into left lateral sulci

## 2018-01-13 NOTE — H&P ADULT - HISTORY OF PRESENT ILLNESS
89 y/o white male with hx Paroxysmal A Fib, HTN, Increased Cholesterol, Gout, s/p Rt Cataract Surgery, admitted today via the ER from [facility] with SOB initially requiring NRB O2 and found to be febrile to 101.7 with wheezing and rhonchi on physical exam.  W/u with Rapid Influenza testing was done and now reported Positive for Influenza A and 2 Blood Cx's and Urine Cx were obtained and pending.  CXR was done and reported now with a RLL Infiltrate along with trace Pleural effusion.  Patient is originally from his own home and was admitted to [facility] from 12/18 to 12/22 during which time he was rx'ed for Multifocal Rt PNA with Zosyn and Vanco and then d/c'ed to [facility] on po Augmentin.  Patient was again seen in the ER on 1/6 after he apparently tripped on a telephone wire while at [facility] and sustained a laceration to the Lt Scalp which required sutures and patient was then d/c'ed  back to [facility].  Patient now c/o cough with only intermittent sputum production since yesterday along with increased SOB and fatigue.  No c/o cp, no abdominal pain, no N/V but c/o soft stools which he cannot always control.  No c/o dysuria or hesitancy, and claims his appetite is fair.   Patient was begun empirically on Zosyn and Vanco 2 days prior to ER but got > SOB

## 2018-01-13 NOTE — SWALLOW BEDSIDE ASSESSMENT ADULT - PHARYNGEAL PHASE
Decreased laryngeal elevation/Wet vocal quality post oral intake/Delayed cough post oral intake/Delayed throat clear post oral intake Decreased laryngeal elevation/Multiple swallows Multiple swallows/Decreased laryngeal elevation

## 2018-01-13 NOTE — PROGRESS NOTE ADULT - SUBJECTIVE AND OBJECTIVE BOX
Patient is a 89y old  Male who presents with a chief complaint of SOB, cough (2018 09:33)      INTERVAL HPI/OVERNIGHT EVENTS: pt is delusional - "my son got into car accident and 2 people were killed"    MEDICATIONS  (STANDING):  ALBUTerol/ipratropium for Nebulization 3 milliLiter(s) Nebulizer every 6 hours  aspirin enteric coated 81 milliGRAM(s) Oral daily  ATENolol  Tablet 50 milliGRAM(s) Oral daily  atorvastatin 20 milliGRAM(s) Oral at bedtime  azithromycin   Tablet 500 milliGRAM(s) Oral daily  buDESOnide   0.5 milliGRAM(s) Respule 0.5 milliGRAM(s) Inhalation every 12 hours  dextrose 5% + sodium chloride 0.9%. 1000 milliLiter(s) (75 mL/Hr) IV Continuous <Continuous>  guaiFENesin    Syrup 200 milliGRAM(s) Oral every 4 hours  lactobacillus acidophilus 1 Tablet(s) Oral every 12 hours  meropenem IVPB      meropenem IVPB 1000 milliGRAM(s) IV Intermittent every 8 hours  oseltamivir 30 milliGRAM(s) Oral two times a day  pantoprazole  Injectable 40 milliGRAM(s) IV Push every 12 hours  vancomycin  IVPB 750 milliGRAM(s) IV Intermittent every 12 hours    MEDICATIONS  (PRN):  acetaminophen  Suppository 650 milliGRAM(s) Rectal every 6 hours PRN For Temp greater than 38 C (100.4 F)      Allergies    aspirin (Stomach Upset)    Intolerances        REVIEW OF SYSTEMS: eating breakfast himself, fair appetite        HEENT - wnl  RESPIRATORY: cough, sob  CARDIOVASCULAR: No chest pain, palpitations, dizziness, or leg swelling  GASTROINTESTINAL: No abdominal or epigastric pain. No nausea, vomiting, or hematemesis; No diarrhea or constipation. No melena or hematochezia.  GENITOURINARY: No dysuria, frequency, hematuria, or incontinence  SKIN: No itching, burning, rashes, or lesions   MUSCULOSKELETAL: weakness  PSYCHIATRIC: delusional        Vital Signs Last 24 Hrs  T(C): 36.4 (2018 05:35), Max: 36.9 (2018 00:14)  T(F): 97.6 (2018 05:35), Max: 98.4 (2018 00:14)  HR: 77 (2018 06:59) (61 - 119)  BP: 171/78 (2018 05:35) (132/63 - 171/78)  BP(mean): --  RR: 20 (2018 05:35) (16 - 20)  SpO2: 97% (2018 06:59) (96% - 99%)    PHYSICAL EXAM:  general       HEENT wnl  CHEST/LUNG: < BS R base, anter. conductive rhonchi  HEART: irregular. rate controlled  ABDOMEN: Soft, Nontender, Nondistended; Bowel sounds present  EXTREMITIES:  2+ Peripheral Pulses, No clubbing, cyanosis, or edema  LYMPH: No lymphadenopathy noted  SKIN: No rashes or lesions    LABS:                        12.3   10.3  )-----------( 212      ( 2018 06:18 )             38.2     -    143  |  106  |  17  ----------------------------<  105<H>  3.7   |  32<H>  |  0.66    Ca    8.1<L>      2018 06:18        Urinalysis Basic - ( 2018 12:03 )    Color: Yellow / Appearance: Slightly Turbid / S.025 / pH: x  Gluc: x / Ketone: Negative  / Bili: Negative / Urobili: Negative mg/dL   Blood: x / Protein: 30 mg/dL / Nitrite: Negative   Leuk Esterase: Trace / RBC: >50 /HPF / WBC 3-5   Sq Epi: x / Non Sq Epi: Few / Bacteria: Moderate      CAPILLARY BLOOD GLUCOSE        ABG - ( 2018 10:30 )  pH: x     /  pCO2: 46    /  pO2: 305   / HCO3: 28    / Base Excess: 3.0   /  SaO2: 100                         RADIOLOGY & ADDITIONAL TESTS:    Imaging Personally Reviewed:  [y ] YES  [ ] NO    Consultant(s) Notes Reviewed:  [y ] YES  [ ] NO    Care Discussed with Consultants/Other Providers [ ] YES  [ ] NO    PROBLEMS:  PNEUMONIA, SEPSIS  DIFFICULTY BREATHING  Pneumonia  Influenza  PAF      Care discussed with family,         [  v]   yes  [  ]  No    imp:    stable[ ]    unstable[  ]     improving [  v ]       unchanged  [  ]                Plans: needs psychiatric evaluation.  PT evaluation  OOB to chair as tolerates

## 2018-01-13 NOTE — PROGRESS NOTE ADULT - SUBJECTIVE AND OBJECTIVE BOX
TMAX - 98.4    On day # 2 Meropenem / # 2 Vanco / # 3 Zithromax / # 2 Tamiflu    Vital Signs Last 24 Hrs  T(C): 36.6 (13 Jan 2018 16:36), Max: 36.9 (13 Jan 2018 00:14)  T(F): 97.8 (13 Jan 2018 16:36), Max: 98.4 (13 Jan 2018 00:14)  HR: 49 (13 Jan 2018 17:44) (48 - 119)  BP: 142/69 (13 Jan 2018 16:36) (125/54 - 171/78)  BP(mean): --  RR: 18 (13 Jan 2018 16:36) (18 - 20)  SpO2: 93% (13 Jan 2018 17:44) (93% - 99%)  Supplemental O2:  on 2 Liters NC O2 now      Awake, alert, claims to be feeling  little better today, with less SOB and some decrease in coughing as well.  No c/o cp, no abdominal pain, and no c/o difficulty voiding.  Appetite still poor, though he claims he ate lunch today.      PHYSICAL EXAM  General:  awake, alert, sitting semi-upright in bed now, with NC O2 in place, pleasant and cooperative, appears comfortable at present  HEENT: conj pink, sclerae anicteric, PERRLA, no oral lesions noted and mucosa appears moist now    Neck: supple, no nodes noted   Heart:  RR  Lungs:  scattered rhonchi with a few rales noted, but no wheezing   Abdomen:  soft, BS +, nontender to palpation  No CVA or Spinal tenderness noted  Extremities:  no edema LE's                     no calf tenderness noted  Skin:  warm, dry, no rash noted      I&O's Summary :    12 Jan 2018 07:01  -  13 Jan 2018 07:00  --------------------------------------------------------  IN: 1115 mL / OUT: 100 mL / NET: 1015 mL    13 Jan 2018 07:01  -  13 Jan 2018 18:42  --------------------------------------------------------  IN: 0 mL / OUT: 100 mL / NET: -100 mL      LABS:  CBC Full  -  ( 13 Jan 2018 06:18 )  WBC Count : 10.3 K/uL  Hemoglobin : 12.3 g/dL  Hematocrit : 38.2 %  Platelet Count - Automated : 212 K/uL  Mean Cell Volume : 95.3 fl  Mean Cell Hemoglobin : 30.8 pg  Mean Cell Hemoglobin Concentration : 32.3 gm/dL  Auto Neutrophil # : x  Auto Lymphocyte # : x  Auto Monocyte # : x  Auto Eosinophil # : x  Auto Basophil # : x  Auto Neutrophil % : x  Auto Lymphocyte % : x  Auto Monocyte % : x  Auto Eosinophil % : x  Auto Basophil % : x    01-13    143  |  106  |  17  ----------------------------<  105<H>  3.7   |  32<H>  |  0.66    Ca    8.1<L>      13 Jan 2018 06:18      Sedimentation Rate, Erythrocyte: 27 mm/hr (01-12 @ 06:19)    CRP - 10.9 ( 1/12 )    Procalcitonin - 0.60 ( 1/12 )    Influenza A Rapid Screen: Positive (01-11 @ 11:31)    Influenza B Rapid Screen: Negative (01-11 @ 11:31)    Rapid RVP - Positive for Influenza A H3      Vancomycin Level, Trough: 13.5 ug/mL (01-12 @ 17:36)        MICROBIOLOGY:  Specimen Source: .Sputum Sputum (01-12 @ 14:43)  Culture Results:   Normal Respiratory Sayda present (01-12 @ 14:43)    Gram Stain:   Numerous polymorphonuclear leukocytes per low power field  Few Squamous epithelial cells per low power field  Few Gram positive cocci in pairs per oil power field  Moderate Gram Positive Rods per oil power field  Few Gram Negative Rods per oil power field  Few Yeast like cells per oil power field (01.12.18 @ 14:43)      Specimen Source: .Urine Clean Catch (Midstream) (01-11 @ 16:14)  Culture Results:   No growth (01-11 @ 16:14)    Specimen Source: .Blood Blood-Peripheral (01-11 @ 11:06)  Culture Results:   No growth to date. (01-11 @ 11:06)    Specimen Source: .Blood Blood-Peripheral (01-11 @ 11:06)  Culture Results:   Growth in aerobic bottle: Coag Negative Staphylococcus  Single set isolate, possible contaminant. Contact  Microbiology if susceptibility testing clinically  indicated.        Legionella Antigen, Urine: Negative (01-11 @ 14:05)        Impression:  Clinically slowly improving on present rx with Meropenem + Vanco + Zithromax + Tamiflu for rx of Sepsis secondary to Influenza AH3 and RLL PNA.  1 Blood Cx with CNS likely represents a contaminant and no specific rx is required.  Noted Sputum Cx is negative, however Gm Stain reveals many PMN's representative of a purulent specimen.    Suggestions:  Will continue present ab rx and follow-up temps and labs.  Repeat CXR in AM.  Discussed with patient in detail at bedside.  Continue Droplet Precautions for Influenza AH3.

## 2018-01-14 LAB
ANION GAP SERPL CALC-SCNC: 6 MMOL/L — SIGNIFICANT CHANGE UP (ref 5–17)
BASOPHILS # BLD AUTO: 0.1 K/UL — SIGNIFICANT CHANGE UP (ref 0–0.2)
BASOPHILS NFR BLD AUTO: 0.8 % — SIGNIFICANT CHANGE UP (ref 0–2)
BUN SERPL-MCNC: 13 MG/DL — SIGNIFICANT CHANGE UP (ref 7–23)
CALCIUM SERPL-MCNC: 7.9 MG/DL — LOW (ref 8.5–10.1)
CHLORIDE SERPL-SCNC: 103 MMOL/L — SIGNIFICANT CHANGE UP (ref 96–108)
CO2 SERPL-SCNC: 34 MMOL/L — HIGH (ref 22–31)
CREAT SERPL-MCNC: 0.71 MG/DL — SIGNIFICANT CHANGE UP (ref 0.5–1.3)
CULTURE RESULTS: SIGNIFICANT CHANGE UP
EOSINOPHIL # BLD AUTO: 0.1 K/UL — SIGNIFICANT CHANGE UP (ref 0–0.5)
EOSINOPHIL NFR BLD AUTO: 0.9 % — SIGNIFICANT CHANGE UP (ref 0–6)
ERYTHROCYTE [SEDIMENTATION RATE] IN BLOOD: 28 MM/HR — HIGH (ref 0–20)
GLUCOSE SERPL-MCNC: 118 MG/DL — HIGH (ref 70–99)
GRAM STN FLD: SIGNIFICANT CHANGE UP
HCT VFR BLD CALC: 37.2 % — LOW (ref 39–50)
HGB BLD-MCNC: 12.6 G/DL — LOW (ref 13–17)
LYMPHOCYTES # BLD AUTO: 19.4 % — SIGNIFICANT CHANGE UP (ref 13–44)
LYMPHOCYTES # BLD AUTO: 2 K/UL — SIGNIFICANT CHANGE UP (ref 1–3.3)
MCHC RBC-ENTMCNC: 31.6 PG — SIGNIFICANT CHANGE UP (ref 27–34)
MCHC RBC-ENTMCNC: 34 GM/DL — SIGNIFICANT CHANGE UP (ref 32–36)
MCV RBC AUTO: 92.7 FL — SIGNIFICANT CHANGE UP (ref 80–100)
MONOCYTES # BLD AUTO: 1.1 K/UL — HIGH (ref 0–0.9)
MONOCYTES NFR BLD AUTO: 10.2 % — SIGNIFICANT CHANGE UP (ref 2–14)
NEUTROPHILS # BLD AUTO: 7.2 K/UL — SIGNIFICANT CHANGE UP (ref 1.8–7.4)
NEUTROPHILS NFR BLD AUTO: 68.7 % — SIGNIFICANT CHANGE UP (ref 43–77)
PLATELET # BLD AUTO: 194 K/UL — SIGNIFICANT CHANGE UP (ref 150–400)
POTASSIUM SERPL-MCNC: 3.8 MMOL/L — SIGNIFICANT CHANGE UP (ref 3.5–5.3)
POTASSIUM SERPL-SCNC: 3.8 MMOL/L — SIGNIFICANT CHANGE UP (ref 3.5–5.3)
RBC # BLD: 4.01 M/UL — LOW (ref 4.2–5.8)
RBC # FLD: 12.4 % — SIGNIFICANT CHANGE UP (ref 11–15)
SODIUM SERPL-SCNC: 143 MMOL/L — SIGNIFICANT CHANGE UP (ref 135–145)
SPECIMEN SOURCE: SIGNIFICANT CHANGE UP
WBC # BLD: 10.5 K/UL — SIGNIFICANT CHANGE UP (ref 3.8–10.5)
WBC # FLD AUTO: 10.5 K/UL — SIGNIFICANT CHANGE UP (ref 3.8–10.5)

## 2018-01-14 PROCEDURE — 71045 X-RAY EXAM CHEST 1 VIEW: CPT | Mod: 26

## 2018-01-14 RX ORDER — ACETAMINOPHEN 500 MG
650 TABLET ORAL EVERY 6 HOURS
Qty: 0 | Refills: 0 | Status: DISCONTINUED | OUTPATIENT
Start: 2018-01-14 | End: 2018-02-13

## 2018-01-14 RX ADMIN — SODIUM CHLORIDE 75 MILLILITER(S): 9 INJECTION, SOLUTION INTRAVENOUS at 22:36

## 2018-01-14 RX ADMIN — SODIUM CHLORIDE 75 MILLILITER(S): 9 INJECTION, SOLUTION INTRAVENOUS at 17:04

## 2018-01-14 RX ADMIN — MEROPENEM 100 MILLIGRAM(S): 1 INJECTION INTRAVENOUS at 13:30

## 2018-01-14 RX ADMIN — Medication 81 MILLIGRAM(S): at 12:22

## 2018-01-14 RX ADMIN — Medication 650 MILLIGRAM(S): at 17:04

## 2018-01-14 RX ADMIN — Medication 200 MILLIGRAM(S): at 13:30

## 2018-01-14 RX ADMIN — Medication 200 MILLIGRAM(S): at 02:08

## 2018-01-14 RX ADMIN — PANTOPRAZOLE SODIUM 40 MILLIGRAM(S): 20 TABLET, DELAYED RELEASE ORAL at 05:25

## 2018-01-14 RX ADMIN — Medication 0.5 MILLIGRAM(S): at 06:23

## 2018-01-14 RX ADMIN — PANTOPRAZOLE SODIUM 40 MILLIGRAM(S): 20 TABLET, DELAYED RELEASE ORAL at 17:04

## 2018-01-14 RX ADMIN — Medication 650 MILLIGRAM(S): at 09:43

## 2018-01-14 RX ADMIN — Medication 1 TABLET(S): at 05:25

## 2018-01-14 RX ADMIN — Medication 0.5 MILLIGRAM(S): at 17:29

## 2018-01-14 RX ADMIN — Medication 200 MILLIGRAM(S): at 22:37

## 2018-01-14 RX ADMIN — Medication 150 MILLIGRAM(S): at 05:26

## 2018-01-14 RX ADMIN — Medication 3 MILLILITER(S): at 23:23

## 2018-01-14 RX ADMIN — Medication 650 MILLIGRAM(S): at 10:30

## 2018-01-14 RX ADMIN — Medication 200 MILLIGRAM(S): at 05:25

## 2018-01-14 RX ADMIN — Medication 30 MILLIGRAM(S): at 12:21

## 2018-01-14 RX ADMIN — AZITHROMYCIN 500 MILLIGRAM(S): 500 TABLET, FILM COATED ORAL at 12:52

## 2018-01-14 RX ADMIN — MEROPENEM 100 MILLIGRAM(S): 1 INJECTION INTRAVENOUS at 05:26

## 2018-01-14 RX ADMIN — Medication 200 MILLIGRAM(S): at 17:05

## 2018-01-14 RX ADMIN — Medication 3 MILLILITER(S): at 06:22

## 2018-01-14 RX ADMIN — Medication 3 MILLILITER(S): at 00:23

## 2018-01-14 RX ADMIN — Medication 150 MILLIGRAM(S): at 17:04

## 2018-01-14 RX ADMIN — MEROPENEM 100 MILLIGRAM(S): 1 INJECTION INTRAVENOUS at 22:37

## 2018-01-14 RX ADMIN — Medication 3 MILLILITER(S): at 11:38

## 2018-01-14 RX ADMIN — ATENOLOL 50 MILLIGRAM(S): 25 TABLET ORAL at 05:25

## 2018-01-14 RX ADMIN — Medication 1 TABLET(S): at 17:04

## 2018-01-14 RX ADMIN — ATORVASTATIN CALCIUM 20 MILLIGRAM(S): 80 TABLET, FILM COATED ORAL at 22:37

## 2018-01-14 RX ADMIN — Medication 3 MILLILITER(S): at 17:29

## 2018-01-14 RX ADMIN — Medication 650 MILLIGRAM(S): at 17:35

## 2018-01-14 NOTE — PROGRESS NOTE ADULT - SUBJECTIVE AND OBJECTIVE BOX
Patient is a 89y old  Male who presents with a chief complaint of SOB, cough (13 Jan 2018 09:33)      INTERVAL HPI/OVERNIGHT EVENTS: still delusional, more comfortable, tolerates 2L NC O2    MEDICATIONS  (STANDING):  ALBUTerol/ipratropium for Nebulization 3 milliLiter(s) Nebulizer every 6 hours  aspirin enteric coated 81 milliGRAM(s) Oral daily  ATENolol  Tablet 50 milliGRAM(s) Oral daily  atorvastatin 20 milliGRAM(s) Oral at bedtime  azithromycin   Tablet 500 milliGRAM(s) Oral daily  buDESOnide   0.5 milliGRAM(s) Respule 0.5 milliGRAM(s) Inhalation every 12 hours  dextrose 5% + sodium chloride 0.9%. 1000 milliLiter(s) (75 mL/Hr) IV Continuous <Continuous>  guaiFENesin    Syrup 200 milliGRAM(s) Oral every 4 hours  lactobacillus acidophilus 1 Tablet(s) Oral every 12 hours  meropenem IVPB      meropenem IVPB 1000 milliGRAM(s) IV Intermittent every 8 hours  oseltamivir 30 milliGRAM(s) Oral two times a day  pantoprazole  Injectable 40 milliGRAM(s) IV Push every 12 hours  vancomycin  IVPB 750 milliGRAM(s) IV Intermittent every 12 hours    MEDICATIONS  (PRN):  acetaminophen   Tablet. 650 milliGRAM(s) Oral every 6 hours PRN Moderate Pain (4 - 6)  acetaminophen  Suppository 650 milliGRAM(s) Rectal every 6 hours PRN For Temp greater than 38 C (100.4 F)      Allergies    aspirin (Stomach Upset)    Intolerances        REVIEW OF SYSTEMS:  no CP, < SOB, < Cough      Vital Signs Last 24 Hrs  T(C): 36.8 (14 Jan 2018 05:16), Max: 36.8 (14 Jan 2018 05:16)  T(F): 98.2 (14 Jan 2018 05:16), Max: 98.2 (14 Jan 2018 05:16)  HR: 71 (14 Jan 2018 09:10) (48 - 101)  BP: 139/67 (14 Jan 2018 05:16) (125/54 - 146/62)  BP(mean): --  RR: 19 (14 Jan 2018 05:16) (18 - 20)  SpO2: 97% (14 Jan 2018 09:10) (92% - 99%)    PHYSICAL EXAM:  general       HEENT wnl  CHEST/LUNG: rhonchi b/l  HEART: Regular rate and rhythm; No murmurs, rubs, or gallops  ABDOMEN: Soft, Nontender, Nondistended; Bowel sounds present  EXTREMITIES:  2+ Peripheral Pulses, No clubbing, cyanosis, or edema  LYMPH: No lymphadenopathy noted  SKIN: No rashes or lesions    LABS:                        12.6   10.5  )-----------( 194      ( 14 Jan 2018 06:49 )             37.2     01-14    143  |  103  |  13  ----------------------------<  118<H>  3.8   |  34<H>  |  0.71    Ca    7.9<L>      14 Jan 2018 06:49          CAPILLARY BLOOD GLUCOSE                    RADIOLOGY & ADDITIONAL TESTS:    Imaging Personally Reviewed:  [y ] YES  [ ] NO    Consultant(s) Notes Reviewed:  [y ] YES  [ ] NO    Care Discussed with Consultants/Other Providers [ ] YES  [ ] NO    PROBLEMS:  PNEUMONIA, SEPSIS  DIFFICULTY BREATHING  Pneumonia  Influenza      Care discussed with family,         [ v ]   yes  [  ]  No    imp:    stable[ ]    unstable[  ]     improving [   v]       unchanged  [  ]                Plans:  Continue present plans  [  v]

## 2018-01-14 NOTE — PHYSICAL THERAPY INITIAL EVALUATION ADULT - ADDITIONAL COMMENTS
(Per EMR documentation by same writer in Dec 2017): As per patient, lives alone in private house c 3 stair steps to enter c handrails. Only dwells now on house's main level, where living area was converted to his bedroom and bathroom is about 10 feet from bed. Denies home health aide. Does not use a cane he has at home to ambulate prior to admission.

## 2018-01-14 NOTE — PHYSICAL THERAPY INITIAL EVALUATION ADULT - GENERAL OBSERVATIONS, REHAB EVAL
Patient sat up on bedside chair c stable vital signs. AAOx4. Patient denies pain or shortness of breath. Chest Auscultation: clear breath sounds throughout but diminished on left base. Palpation: no tactile fremitus, symmetric expansion. Cough: strong, effective.

## 2018-01-14 NOTE — PHYSICAL THERAPY INITIAL EVALUATION ADULT - PLANNED THERAPY INTERVENTIONS, PT EVAL
gait training/neuromuscular re-education/postural re-education/bed mobility training/strengthening/transfer training/stretching/balance training/ROM

## 2018-01-14 NOTE — PHYSICAL THERAPY INITIAL EVALUATION ADULT - CRITERIA FOR SKILLED THERAPEUTIC INTERVENTIONS
impairments found/functional limitations in following categories/rehab potential/risk reduction/prevention/anticipated discharge recommendation

## 2018-01-14 NOTE — PROGRESS NOTE ADULT - SUBJECTIVE AND OBJECTIVE BOX
DR [employer] COVERAGE APPRECIATED    IN CHAIR  BREATHING COMFORTABLY  COARSE BREATH SOUNDS  IRREGULAR RHYTHM  ABDOMEN SOFT, NONTENDER, NO DISTENSION   NO EDEMA    CONTINUING TREATMENT FOR PNEUMONIA  PRN DIURESIS  DNR    KAMLA PRABHAKAR MD, FACC

## 2018-01-14 NOTE — PHYSICAL THERAPY INITIAL EVALUATION ADULT - PERTINENT HX OF CURRENT PROBLEM, REHAB EVAL
Patient came in to [facility] ED from sub-acute rehab due to shortness of breath and cough. Chart reviewed and noted – hematology: raised ESR, normalized WBC;   chemistry: high serum BNP (not retested); (+) Influenza AH3; CXRay showing worsening lung capacity form previous tests. Impression: left sided PNA.

## 2018-01-15 RX ADMIN — MEROPENEM 100 MILLIGRAM(S): 1 INJECTION INTRAVENOUS at 05:36

## 2018-01-15 RX ADMIN — PANTOPRAZOLE SODIUM 40 MILLIGRAM(S): 20 TABLET, DELAYED RELEASE ORAL at 18:11

## 2018-01-15 RX ADMIN — Medication 200 MILLIGRAM(S): at 12:59

## 2018-01-15 RX ADMIN — Medication 200 MILLIGRAM(S): at 14:43

## 2018-01-15 RX ADMIN — Medication 3 MILLILITER(S): at 17:07

## 2018-01-15 RX ADMIN — PANTOPRAZOLE SODIUM 40 MILLIGRAM(S): 20 TABLET, DELAYED RELEASE ORAL at 05:36

## 2018-01-15 RX ADMIN — ATORVASTATIN CALCIUM 20 MILLIGRAM(S): 80 TABLET, FILM COATED ORAL at 22:18

## 2018-01-15 RX ADMIN — MEROPENEM 100 MILLIGRAM(S): 1 INJECTION INTRAVENOUS at 22:18

## 2018-01-15 RX ADMIN — MEROPENEM 100 MILLIGRAM(S): 1 INJECTION INTRAVENOUS at 14:43

## 2018-01-15 RX ADMIN — Medication 3 MILLILITER(S): at 05:07

## 2018-01-15 RX ADMIN — Medication 200 MILLIGRAM(S): at 05:35

## 2018-01-15 RX ADMIN — Medication 3 MILLILITER(S): at 23:53

## 2018-01-15 RX ADMIN — Medication 30 MILLIGRAM(S): at 00:29

## 2018-01-15 RX ADMIN — AZITHROMYCIN 500 MILLIGRAM(S): 500 TABLET, FILM COATED ORAL at 13:00

## 2018-01-15 RX ADMIN — Medication 81 MILLIGRAM(S): at 13:00

## 2018-01-15 RX ADMIN — Medication 0.5 MILLIGRAM(S): at 17:07

## 2018-01-15 RX ADMIN — Medication 200 MILLIGRAM(S): at 18:10

## 2018-01-15 RX ADMIN — Medication 1 TABLET(S): at 18:10

## 2018-01-15 RX ADMIN — ATENOLOL 50 MILLIGRAM(S): 25 TABLET ORAL at 05:36

## 2018-01-15 RX ADMIN — Medication 150 MILLIGRAM(S): at 18:11

## 2018-01-15 RX ADMIN — Medication 30 MILLIGRAM(S): at 13:00

## 2018-01-15 RX ADMIN — Medication 150 MILLIGRAM(S): at 05:36

## 2018-01-15 RX ADMIN — Medication 0.5 MILLIGRAM(S): at 05:08

## 2018-01-15 RX ADMIN — Medication 3 MILLILITER(S): at 11:22

## 2018-01-15 RX ADMIN — Medication 1 TABLET(S): at 05:37

## 2018-01-15 RX ADMIN — Medication 200 MILLIGRAM(S): at 01:59

## 2018-01-15 RX ADMIN — Medication 200 MILLIGRAM(S): at 22:18

## 2018-01-15 RX ADMIN — Medication 30 MILLIGRAM(S): at 23:39

## 2018-01-15 NOTE — PROGRESS NOTE ADULT - ASSESSMENT
cxr possible rll infiltrate   improving  influenza and community acquired pneumonia . continue present meds

## 2018-01-15 NOTE — PROGRESS NOTE ADULT - SUBJECTIVE AND OBJECTIVE BOX
TMAX - 98.7    On day # 4 Meropenem / # 4 Vanco / # 5 Zithromax / # 4 Tamiflu    Vital Signs Last 24 Hrs  T(C): 36.2 (15 Shon 2018 11:37), Max: 37.1 (14 Jan 2018 17:28)  T(F): 97.2 (15 Shon 2018 11:37), Max: 98.7 (14 Jan 2018 17:28)  HR: 74 (15 Shon 2018 17:19) (46 - 90)  BP: 111/42 (15 Shon 2018 11:37) (111/42 - 143/64)  BP(mean): --  RR: 17 (15 Shon 2018 11:37) (17 - 19)  SpO2: 96% (15 Shon 2018 17:19) (94% - 99%)  Supplemental O2:   on NC O2 - 2 Liters    Awakens, c/o fatigue, but claims he has less SOB and decreased cough now.  Appetite slowly improving as well.  No c/o cp, no abdominal pain, and no c/o urinary difficulties.  C/o feeling cold though - covered with a blanket with improvement.      PHYSICAL EXAM  General:  awakens, sitting up in the chair at present, [place] and not wearing his hearing aides,but pleasant and cooperative, in NAD.  HEENT:  conj pink, sclerae anicteric, PERRLA, no oral lesions noted, mucosa dry though  Neck:  supple, no nodes noted  Heart:  RR  Lungs:  bilateral moist rhonchi, but no wheezing noted  Abdomen:  soft, BS +, nontender to palpation  No CVA or Spinal tenderness elicited  Extremities:  no edema LE's  Skin:  warm, dry, no rash noted        I&O's Summary :    14 Jan 2018 07:01  -  15 Shon 2018 07:00  --------------------------------------------------------  IN: 1075 mL / OUT: 502 mL / NET: 573 mL    15 Shon 2018 07:01  -  15 Shon 2018 17:20  --------------------------------------------------------  IN: 300 mL / OUT: 0 mL / NET: 300 mL      LABS:  CBC Full  -  ( 14 Jan 2018 06:49 )  WBC Count : 10.5 K/uL  Hemoglobin : 12.6 g/dL  Hematocrit : 37.2 %  Platelet Count - Automated : 194 K/uL  Mean Cell Volume : 92.7 fl  Mean Cell Hemoglobin : 31.6 pg  Mean Cell Hemoglobin Concentration : 34.0 gm/dL  Auto Neutrophil # : 7.2 K/uL  Auto Lymphocyte # : 2.0 K/uL  Auto Monocyte # : 1.1 K/uL  Auto Eosinophil # : 0.1 K/uL  Auto Basophil # : 0.1 K/uL  Auto Neutrophil % : 68.7 %  Auto Lymphocyte % : 19.4 %  Auto Monocyte % : 10.2 %  Auto Eosinophil % : 0.9 %  Auto Basophil % : 0.8 %    01-14    143  |  103  |  13  ----------------------------<  118<H>  3.8   |  34<H>  |  0.71    Ca    7.9<L>      14 Jan 2018 06:49      Sedimentation Rate, Erythrocyte: 28 mm/hr (01-14 @ 06:49)    Sedimentation Rate, Erythrocyte: 27 mm/hr (01-12 @ 06:19)      Influenza A Rapid Screen: Positive (01-11 @ 11:31)    Influenza B Rapid Screen: Negative (01-11 @ 11:31)    Rapid RVP - Positive for Influenza A H3      Vancomycin Level, Trough: 13.5 ug/mL (01-12 @ 17:36)        MICROBIOLOGY:  Specimen Source: .Sputum Sputum (01-12 @ 14:43)  Culture Results:   Normal Respiratory Sayda present (01-12 @ 14:43)    Specimen Source: .Urine Clean Catch (Midstream) (01-11 @ 16:14)  Culture Results:   No growth (01-11 @ 16:14)    Specimen Source: .Blood Blood-Peripheral (01-11 @ 11:06)  Culture Results:   No growth to date. (01-11 @ 11:06)    Specimen Source: .Blood Blood-Peripheral (01-11 @ 11:06)  Culture Results:   Growth in aerobic bottle: Coag Negative Staphylococcus  Single set isolate, possible contaminant. Contact  Microbiology if susceptibility testing clinically  indicated.            Legionella Antigen, Urine: Negative (01-11 @ 14:05)        Radiology:  CXR - < from: Xray Chest 1 View AP -PORTABLE-Routine (01.14.18 @ 08:23) >  Comparison: 1/11/2018.     The mediastinal cardiac silhouette is unremarkable.    There is worsening right lower lobe opacity when compared to the previous   exam. Blunting of the left costophrenic angle.    No acute osseous finding.     IMPRESSION:    Worsening lung opacity. In the proper clinical setting this could   represent pneumonia. Correlate clinically and follow to resolution.      Impression:        Suggestions:

## 2018-01-15 NOTE — PROGRESS NOTE ADULT - SUBJECTIVE AND OBJECTIVE BOX
INTERVAL HPI:    oob in chair  OVERNIGHT EVENTS:        Vital Signs Last 24 Hrs  T(C): 36.1 (15 Shon 2018 17:28), Max: 36.7 (15 Shon 2018 00:16)  T(F): 97 (15 Shon 2018 17:28), Max: 98 (15 Shon 2018 00:16)  HR: 70 (15 Shon 2018 17:28) (46 - 90)  BP: 122/54 (15 Shon 2018 17:28) (111/42 - 143/64)  BP(mean): --  RR: 18 (15 Shon 2018 17:28) (17 - 19)  SpO2: 96% (15 Shon 2018 17:28) (94% - 99%)        PHYSICAL EXAM:  head nc/at, no jvd, no hjr.   lungs -clear to a&p.   cor s1 s2 no h,m,g,r,.   abd soft non tender bs+,no hsm.    ext no cce.   neuro no fnd    MEDICATIONS  (STANDING):  ALBUTerol/ipratropium for Nebulization 3 milliLiter(s) Nebulizer every 6 hours  aspirin enteric coated 81 milliGRAM(s) Oral daily  ATENolol  Tablet 50 milliGRAM(s) Oral daily  atorvastatin 20 milliGRAM(s) Oral at bedtime  azithromycin   Tablet 500 milliGRAM(s) Oral daily  buDESOnide   0.5 milliGRAM(s) Respule 0.5 milliGRAM(s) Inhalation every 12 hours  dextrose 5% + sodium chloride 0.9%. 1000 milliLiter(s) (75 mL/Hr) IV Continuous <Continuous>  guaiFENesin    Syrup 200 milliGRAM(s) Oral every 4 hours  lactobacillus acidophilus 1 Tablet(s) Oral every 12 hours  meropenem IVPB      meropenem IVPB 1000 milliGRAM(s) IV Intermittent every 8 hours  oseltamivir 30 milliGRAM(s) Oral two times a day  pantoprazole  Injectable 40 milliGRAM(s) IV Push every 12 hours  vancomycin  IVPB 750 milliGRAM(s) IV Intermittent every 12 hours    MEDICATIONS  (PRN):  acetaminophen   Tablet. 650 milliGRAM(s) Oral every 6 hours PRN Moderate Pain (4 - 6)  acetaminophen  Suppository 650 milliGRAM(s) Rectal every 6 hours PRN For Temp greater than 38 C (100.4 F)        LABS:                        12.6   10.5  )-----------( 194      ( 14 Jan 2018 06:49 )             37.2     01-14    143  |  103  |  13  ----------------------------<  118<H>  3.8   |  34<H>  |  0.71    Ca    7.9<L>      14 Jan 2018 06:49        01-11 @ 10:30  pH: 7.40  pCO2: 46  pO2: 305  SaO2: 100          RADIOLOGY & ADDITIONAL STUDIES:

## 2018-01-15 NOTE — PROGRESS NOTE ADULT - SUBJECTIVE AND OBJECTIVE BOX
Patient is a 89y old  Male who presents with a chief complaint of SOB, cough (13 Jan 2018 09:33)      INTERVAL HPI/OVERNIGHT EVENTS: comfortable, sitting in chair    MEDICATIONS  (STANDING):  ALBUTerol/ipratropium for Nebulization 3 milliLiter(s) Nebulizer every 6 hours  aspirin enteric coated 81 milliGRAM(s) Oral daily  ATENolol  Tablet 50 milliGRAM(s) Oral daily  atorvastatin 20 milliGRAM(s) Oral at bedtime  azithromycin   Tablet 500 milliGRAM(s) Oral daily  buDESOnide   0.5 milliGRAM(s) Respule 0.5 milliGRAM(s) Inhalation every 12 hours  dextrose 5% + sodium chloride 0.9%. 1000 milliLiter(s) (75 mL/Hr) IV Continuous <Continuous>  guaiFENesin    Syrup 200 milliGRAM(s) Oral every 4 hours  lactobacillus acidophilus 1 Tablet(s) Oral every 12 hours  meropenem IVPB      meropenem IVPB 1000 milliGRAM(s) IV Intermittent every 8 hours  oseltamivir 30 milliGRAM(s) Oral two times a day  pantoprazole  Injectable 40 milliGRAM(s) IV Push every 12 hours  vancomycin  IVPB 750 milliGRAM(s) IV Intermittent every 12 hours    MEDICATIONS  (PRN):  acetaminophen   Tablet. 650 milliGRAM(s) Oral every 6 hours PRN Moderate Pain (4 - 6)  acetaminophen  Suppository 650 milliGRAM(s) Rectal every 6 hours PRN For Temp greater than 38 C (100.4 F)      Allergies    aspirin (Stomach Upset)    Intolerances        REVIEW OF SYSTEMS:    no CP, no SOB, no Abd pain; < appetitie, weakness      Vital Signs Last 24 Hrs  T(C): 36.6 (15 Shon 2018 05:26), Max: 37.1 (14 Jan 2018 16:03)  T(F): 97.8 (15 Shon 2018 05:26), Max: 98.7 (14 Jan 2018 16:03)  HR: 65 (15 Shon 2018 05:26) (46 - 96)  BP: 125/61 (15 Shon 2018 05:26) (104/57 - 143/64)  BP(mean): --  RR: 18 (15 Shon 2018 05:26) (18 - 20)  SpO2: 98% (15 Shon 2018 05:26) (94% - 99%)    PHYSICAL EXAM:  general       HEENT wnl  CHEST/LUNG: <  BS at bases  HEART: Regular rate and rhythm; No murmurs, rubs, or gallops  ABDOMEN: Soft, mildly distended  EXTREMITIES:  2+ Peripheral Pulses, No clubbing, cyanosis, or edema  LYMPH: No lymphadenopathy noted  SKIN: No rashes or lesions    LABS:                        12.6   10.5  )-----------( 194      ( 14 Jan 2018 06:49 )             37.2     01-14    143  |  103  |  13  ----------------------------<  118<H>  3.8   |  34<H>  |  0.71    Ca    7.9<L>      14 Jan 2018 06:49          CAPILLARY BLOOD GLUCOSE                    RADIOLOGY & ADDITIONAL TESTS:    Imaging Personally Reviewed:  [y ] YES  [ ] NO    Consultant(s) Notes Reviewed:  [y ] YES  [ ] NO cardiology and Conrad consults appreciated    Care Discussed with Consultants/Other Providers [ ] YES  [ ] NO    PROBLEMS:  PNEUMONIA, SEPSIS  DIFFICULTY BREATHING  Pneumonia, Influenza  PAF ASHD  delirium mild    Care discussed with family,         [ v ]   yes  [  ]  No with son    imp:    stable[ ]    unstable[  ]     improving [ v  ]       unchanged  [  ]                Plans:  Continue present plans  [ v ] CXR today

## 2018-01-16 DIAGNOSIS — F05 DELIRIUM DUE TO KNOWN PHYSIOLOGICAL CONDITION: ICD-10-CM

## 2018-01-16 LAB
ANION GAP SERPL CALC-SCNC: 5 MMOL/L — SIGNIFICANT CHANGE UP (ref 5–17)
BUN SERPL-MCNC: 13 MG/DL — SIGNIFICANT CHANGE UP (ref 7–23)
C PNEUM IGM TITR SER: SIGNIFICANT CHANGE UP TITER
CALCIUM SERPL-MCNC: 8 MG/DL — LOW (ref 8.5–10.1)
CHLAMYDIA IGG SER-ACNC: ABNORMAL TITER
CHLAMYDIA PNEUMONIAE IGA: ABNORMAL TITER
CHLORIDE SERPL-SCNC: 104 MMOL/L — SIGNIFICANT CHANGE UP (ref 96–108)
CO2 SERPL-SCNC: 34 MMOL/L — HIGH (ref 22–31)
CREAT SERPL-MCNC: 0.75 MG/DL — SIGNIFICANT CHANGE UP (ref 0.5–1.3)
CULTURE RESULTS: SIGNIFICANT CHANGE UP
GLUCOSE SERPL-MCNC: 106 MG/DL — HIGH (ref 70–99)
HCT VFR BLD CALC: 34.7 % — LOW (ref 39–50)
HGB BLD-MCNC: 11.7 G/DL — LOW (ref 13–17)
MCHC RBC-ENTMCNC: 31.6 PG — SIGNIFICANT CHANGE UP (ref 27–34)
MCHC RBC-ENTMCNC: 33.8 GM/DL — SIGNIFICANT CHANGE UP (ref 32–36)
MCV RBC AUTO: 93.7 FL — SIGNIFICANT CHANGE UP (ref 80–100)
PLATELET # BLD AUTO: 189 K/UL — SIGNIFICANT CHANGE UP (ref 150–400)
POTASSIUM SERPL-MCNC: 3.2 MMOL/L — LOW (ref 3.5–5.3)
POTASSIUM SERPL-SCNC: 3.2 MMOL/L — LOW (ref 3.5–5.3)
RBC # BLD: 3.71 M/UL — LOW (ref 4.2–5.8)
RBC # FLD: 12.4 % — SIGNIFICANT CHANGE UP (ref 11–15)
SODIUM SERPL-SCNC: 143 MMOL/L — SIGNIFICANT CHANGE UP (ref 135–145)
SPECIMEN SOURCE: SIGNIFICANT CHANGE UP
WBC # BLD: 10.8 K/UL — HIGH (ref 3.8–10.5)
WBC # FLD AUTO: 10.8 K/UL — HIGH (ref 3.8–10.5)

## 2018-01-16 PROCEDURE — 90792 PSYCH DIAG EVAL W/MED SRVCS: CPT

## 2018-01-16 RX ORDER — POTASSIUM CHLORIDE 20 MEQ
40 PACKET (EA) ORAL EVERY 4 HOURS
Qty: 0 | Refills: 0 | Status: COMPLETED | OUTPATIENT
Start: 2018-01-16 | End: 2018-01-16

## 2018-01-16 RX ADMIN — PANTOPRAZOLE SODIUM 40 MILLIGRAM(S): 20 TABLET, DELAYED RELEASE ORAL at 17:30

## 2018-01-16 RX ADMIN — MEROPENEM 100 MILLIGRAM(S): 1 INJECTION INTRAVENOUS at 06:23

## 2018-01-16 RX ADMIN — Medication 1 TABLET(S): at 06:24

## 2018-01-16 RX ADMIN — Medication 81 MILLIGRAM(S): at 11:49

## 2018-01-16 RX ADMIN — Medication 150 MILLIGRAM(S): at 17:29

## 2018-01-16 RX ADMIN — MEROPENEM 100 MILLIGRAM(S): 1 INJECTION INTRAVENOUS at 14:55

## 2018-01-16 RX ADMIN — Medication 3 MILLILITER(S): at 18:17

## 2018-01-16 RX ADMIN — Medication 150 MILLIGRAM(S): at 06:28

## 2018-01-16 RX ADMIN — Medication 200 MILLIGRAM(S): at 06:25

## 2018-01-16 RX ADMIN — PANTOPRAZOLE SODIUM 40 MILLIGRAM(S): 20 TABLET, DELAYED RELEASE ORAL at 06:32

## 2018-01-16 RX ADMIN — Medication 30 MILLIGRAM(S): at 23:10

## 2018-01-16 RX ADMIN — Medication 1 TABLET(S): at 17:28

## 2018-01-16 RX ADMIN — Medication 40 MILLIEQUIVALENT(S): at 12:05

## 2018-01-16 RX ADMIN — Medication 200 MILLIGRAM(S): at 14:55

## 2018-01-16 RX ADMIN — Medication 3 MILLILITER(S): at 06:35

## 2018-01-16 RX ADMIN — Medication 40 MILLIEQUIVALENT(S): at 17:29

## 2018-01-16 RX ADMIN — ATENOLOL 50 MILLIGRAM(S): 25 TABLET ORAL at 06:25

## 2018-01-16 RX ADMIN — Medication 200 MILLIGRAM(S): at 11:39

## 2018-01-16 RX ADMIN — AZITHROMYCIN 500 MILLIGRAM(S): 500 TABLET, FILM COATED ORAL at 11:49

## 2018-01-16 RX ADMIN — MEROPENEM 100 MILLIGRAM(S): 1 INJECTION INTRAVENOUS at 22:18

## 2018-01-16 RX ADMIN — Medication 3 MILLILITER(S): at 13:04

## 2018-01-16 RX ADMIN — Medication 200 MILLIGRAM(S): at 17:28

## 2018-01-16 RX ADMIN — Medication 0.5 MILLIGRAM(S): at 06:35

## 2018-01-16 RX ADMIN — Medication 30 MILLIGRAM(S): at 11:50

## 2018-01-16 RX ADMIN — Medication 0.5 MILLIGRAM(S): at 18:16

## 2018-01-16 NOTE — BEHAVIORAL HEALTH ASSESSMENT NOTE - RISK ASSESSMENT
low overall: older male, no psychiatric history, confusion, no aggression, no substance abuse, will receive supports and supervision

## 2018-01-16 NOTE — BEHAVIORAL HEALTH ASSESSMENT NOTE - SUMMARY
Briefly the patient is a 89 year old male, , used to live at home by self- was independent-driving till about a month ago, when he experienced medical set backs, was admitted to [facility], subsequently discharged to [facility], has medical history notable for Afib, Gout, HTN, HLD, has no psychiatric history, no history of inpatient psychiatric admissions, no legal issues, no substance abuse, presented to the ed from [facility] with complaints of sob, cough. Admitted for PNA and flu++. No aggression or agitation. Compliant with care and assessments. Psychiatry evaluation was requested to assess for a 'delusional disorder'.   During evaluation today, patient is confused, disoriented. SOB++ distress++, cough++, and left upper abdominal pain++. Does not engage in any meaningful communication today with MD. No aggression. Communicated with DANIEL Chatterjee about the above symptoms and distress.   Based on assessment and collateral from son, patient does not have a psychiatric history, and this is not a delusional disorder. Patient does meet criteria for delirium and his confusion, possible paranoid statements are in the context of that.

## 2018-01-16 NOTE — BEHAVIORAL HEALTH ASSESSMENT NOTE - NSBHCONSULTMEDS_PSY_A_CORE FT
No indication to start a psychotropic at this point. Son agrees.  Avoid bzd, anticholinergics and antihistamines  Frequent reorientation  Collaborate care with son.

## 2018-01-16 NOTE — PROGRESS NOTE ADULT - ASSESSMENT
RLL Pneumonia   Flu    Recommendations    continue broad spectrum Antibiotics as per ID - on merem and vanco   repeat CXR in 1-2 days   tolerating Nasal Cannula   ABG in AM on Nasal Cannula   change BIPAP to PRN for respiratory distress   continue bronchodilators

## 2018-01-16 NOTE — PROGRESS NOTE ADULT - SUBJECTIVE AND OBJECTIVE BOX
TMAX - 98    On day # 5 Meropenem / # 5 Vanco / # 6 Zithromax / # 5 Tamiflu now    Vital Signs Last 24 Hrs  T(C): 35.3 (16 Jan 2018 12:28), Max: 36.1 (15 Shon 2018 17:28)  T(F): 95.5 (16 Jan 2018 12:28), Max: 97 (15 Shon 2018 17:28)  HR: 63 (16 Jan 2018 13:06) (63 - 83)  BP: 138/59 (16 Jan 2018 12:28) (122/54 - 147/73)  BP(mean): --  RR: 22 (16 Jan 2018 12:28) (18 - 22)  SpO2: 92% (16 Jan 2018 13:06) (92% - 99%)  Supplemental O2:  on 2 Liters NC O2     Lethargic but arousable.  No c/o cp, no SOB now, and cough reportedly decreasing.      PHYSICAL EXAM  General:  arousable but lethargic today, lying in bed now with NC O2 in place, in NAD now  HEENT:  conj pink, sclerae anicteric, PERRLA, no oral lesions noted  Neck:  supple, no nodes noted  Heart:  RR  Lungs:  scattered moist rhonchi bilaterally but decreased, and no wheezing noted  Abdomen:  soft, BS +, nontender to palpation                   no CVA or Spinal tenderness noted  Extremities:  1+ edema LE's now                     no calf tenderness noted  Skin: warm, dry, no rash noted         I&O's Summary :    15 Shon 2018 07:01  -  16 Jan 2018 07:00  --------------------------------------------------------  IN: 300 mL / OUT: 0 mL / NET: 300 mL    16 Jan 2018 07:01  -  16 Jan 2018 17:11  --------------------------------------------------------  IN: 60 mL / OUT: 0 mL / NET: 60 mL      LABS:  CBC Full  -  ( 16 Jan 2018 08:14 )  WBC Count : 10.8 K/uL  Hemoglobin : 11.7 g/dL  Hematocrit : 34.7 %  Platelet Count - Automated : 189 K/uL  Mean Cell Volume : 93.7 fl  Mean Cell Hemoglobin : 31.6 pg  Mean Cell Hemoglobin Concentration : 33.8 gm/dL  Auto Neutrophil # : x  Auto Lymphocyte # : x  Auto Monocyte # : x  Auto Eosinophil # : x  Auto Basophil # : x  Auto Neutrophil % : x  Auto Lymphocyte % : x  Auto Monocyte % : x  Auto Eosinophil % : x  Auto Basophil % : x    01-16    143  |  104  |  13  ----------------------------<  106<H>  3.2<L>   |  34<H>  |  0.75    Ca    8.0<L>      16 Jan 2018 08:14      Sedimentation Rate, Erythrocyte: 28 mm/hr (01-14 @ 06:49)    Sedimentation Rate, Erythrocyte: 27 mm/hr (01-12 @ 06:19)        MICROBIOLOGY:  Specimen Source: .Sputum Sputum (01-12 @ 14:43)  Gram Stain:   Numerous polymorphonuclear leukocytes per low power field  Few Squamous epithelial cells per low power field  Few Gram positive cocci in pairs per oil power field  Moderate Gram Positive Rods per oil power field  Few Gram Negative Rods per oil power field  Few Yeast like cells per oil power field (01.12.18 @ 14:43)    Culture Results:   Normal Respiratory Sayda present (01-12 @ 14:43)    Specimen Source: .Urine Clean Catch (Midstream) (01-11 @ 16:14)  Culture Results:   No growth (01-11 @ 16:14)    Specimen Source: .Blood Blood-Peripheral (01-11 @ 11:06)  Culture Results:   No growth at 5 days. (01-11 @ 11:06)    Specimen Source: .Blood Blood-Peripheral (01-11 @ 11:06)  Culture Results:   Growth in aerobic bottle: Coag Negative Staphylococcus  Single set isolate, possible contaminant. Contact  Microbiology if susceptibility testing clinically  indicated.            Radiology:  CXR - < from: Xray Chest 1 View AP -PORTABLE-Routine (01.14.18 @ 08:23) >  A single portable view of the chest was obtained.     Comparison: 1/11/2018.     The mediastinal cardiac silhouette is unremarkable.    There is worsening right lower lobe opacity when compared to the previous   exam. Blunting of the left costophrenic angle.    No acute osseous finding.     IMPRESSION:    Worsening lung opacity. In the proper clinical setting this could   represent pneumonia. Correlate clinically and follow to resolution.        Impression:  Slowly improving on present ab rx with Meropenem + Vanco + Zithromax + Tamiflu for rx of  Sepsis secondary to Influenza AH3 and RLL PNA.  Although sputum CX is negative, Gm Stain consistent with a purulent specimen.  Chlamydia Pneumoniae Serology still pending.      Suggestions:  Will continue present ab rx and follow-up temps and labs.  Repeat CXR in AM.

## 2018-01-16 NOTE — PROGRESS NOTE ADULT - SUBJECTIVE AND OBJECTIVE BOX
VSS  Overall clinically some improvement  CXR - worsening of opacity  Pulmonary and ID to f/u  PT OOB to chair

## 2018-01-16 NOTE — BEHAVIORAL HEALTH ASSESSMENT NOTE - HPI (INCLUDE ILLNESS QUALITY, SEVERITY, DURATION, TIMING, CONTEXT, MODIFYING FACTORS, ASSOCIATED SIGNS AND SYMPTOMS)
Briefly the patient is a 89 year old male, , used to live at home by self- was independent-driving till about a month ago, when he experienced medical set backs, was admitted to [facility], subsequently discharged to [facility], Briefly the patient is a 89 year old male, , used to live at home by self- was independent-driving till about a month ago, when he experienced medical set backs, was admitted to [facility], subsequently discharged to [facility], has medical history notable for Afib, Gout, HTN, HLD, has no psychiatric history, no history of inpatient psychiatric admissions, no legal issues, no substance abuse, presented to the ed from [facility] with complaints of sob, cough. Admitted for PNA and flu++. No aggression or agitation. Compliant with care and assessments. Psychiatry evaluation was requested to assess for a 'delusional disorder'.   Met with the patient. Confused, disoriented. SOB++ distress++, cough++, and left upper abdominal pain++. Does not engage in any meaningful communication today with MD. No aggression. Communicated with DANIEL Chatterjee about the above symptoms and distress.   Care was collaborated with patient's son, who states that prior to a month ago, patient was cognitively intact, articulate and independent. He was driving. States that over the last one month, he has been confused and disoriented. No statements of paranoia or si or hi. Discussed the diagnosis of delirium with the son. Discussed that there is a possibility of residual confusion even when he is medically cleared and patient will likely need more services and supervision at home.

## 2018-01-16 NOTE — PROGRESS NOTE ADULT - SUBJECTIVE AND OBJECTIVE BOX
patient saturating well on Nasal Cannula     Vital Signs Last 24 Hrs  T(C): 35.3 (16 Jan 2018 12:28), Max: 36 (15 Shon 2018 23:54)  T(F): 95.5 (16 Jan 2018 12:28), Max: 96.8 (15 Shon 2018 23:54)  HR: 85 (16 Jan 2018 18:17) (63 - 88)  BP: 138/59 (16 Jan 2018 12:28) (138/59 - 147/73)  BP(mean): --  RR: 22 (16 Jan 2018 12:28) (18 - 22)  SpO2: 95% (16 Jan 2018 18:17) (92% - 99%)  Physical Exam  patient lying in bed in no respiratory distress  HEENT - EOMI, PERRLA ,neck supple , No JVD  lungs - decreased BS, no wheezing , ronchi or rales   COR- [id number] , no murmer  Abd- soft, BS+, no tenderness, no guarding   ext- ecc neg, good pulses  Neuro - Alert , oriented X3   Skin- No rashes   MEDICATIONS  (STANDING):  ALBUTerol/ipratropium for Nebulization 3 milliLiter(s) Nebulizer every 6 hours  aspirin enteric Thank you for this consult  will f/u the patient with youated 81 milliGRAM(s) Oral daily  ATENolol  Tablet 50 milliGRAM(s) Oral daily  atorvastatin 20 milliGRAM(s) Oral at bedtime  azithromycin   Tablet 500 milliGRAM(s) Oral daily  buDESOnide   0.5 milliGRAM(s) Respule 0.5 milliGRAM(s) Inhalation every 12 hours  dextrose 5% + sodium chloride 0.9%. 1000 milliLiter(s) (75 mL/Hr) IV Continuous <Continuous>  guaiFENesin    Syrup 200 milliGRAM(s) Oral every 4 hours  lactobacillus acidophilus 1 Tablet(s) Oral every 12 hours  meropenem IVPB      meropenem IVPB 1000 milliGRAM(s) IV Intermittent every 8 hours  oseltamivir 30 milliGRAM(s) Oral two times a day  pantoprazole  Injectable 40 milliGRAM(s) IV Push every 12 hours  potassium chloride    Tablet ER 40 milliEquivalent(s) Oral every 4 hours  vancomycin  IVPB 750 milliGRAM(s) IV Intermittent every 12 hours    MEDICATIONS  (PRN):  acetaminophen   Tablet. 650 milliGRAM(s) Oral every 6 hours PRN Moderate Pain (4 - 6)  acetaminophen  Suppository 650 milliGRAM(s) Rectal every 6 hours PRN For Temp greater than 38 C (100.4 F)                        11.7   10.8  )-----------( 189      ( 16 Jan 2018 08:14 )             34.7   01-16    143  |  104  |  13  ----------------------------<  106<H>  3.2<L>   |  34<H>  |  0.75    Ca    8.0<L>      16 Jan 2018 08:14

## 2018-01-17 LAB
ANION GAP SERPL CALC-SCNC: 7 MMOL/L — SIGNIFICANT CHANGE UP (ref 5–17)
BASE EXCESS BLDA CALC-SCNC: 9.7 MMOL/L — HIGH (ref -2–2)
BLOOD GAS COMMENTS: SIGNIFICANT CHANGE UP
BLOOD GAS SOURCE: SIGNIFICANT CHANGE UP
BUN SERPL-MCNC: 11 MG/DL — SIGNIFICANT CHANGE UP (ref 7–23)
CALCIUM SERPL-MCNC: 7.6 MG/DL — LOW (ref 8.5–10.1)
CHLORIDE SERPL-SCNC: 104 MMOL/L — SIGNIFICANT CHANGE UP (ref 96–108)
CO2 SERPL-SCNC: 32 MMOL/L — HIGH (ref 22–31)
CREAT SERPL-MCNC: 0.71 MG/DL — SIGNIFICANT CHANGE UP (ref 0.5–1.3)
GLUCOSE SERPL-MCNC: 102 MG/DL — HIGH (ref 70–99)
HCO3 BLDA-SCNC: 33 MMOL/L — HIGH (ref 21–29)
HOROWITZ INDEX BLDA+IHG-RTO: 32 — SIGNIFICANT CHANGE UP
PCO2 BLDA: 41 MMHG — SIGNIFICANT CHANGE UP (ref 32–46)
PH BLD: 7.52 — HIGH (ref 7.35–7.45)
PO2 BLDA: 75 MMHG — SIGNIFICANT CHANGE UP (ref 74–108)
POTASSIUM SERPL-MCNC: 3.8 MMOL/L — SIGNIFICANT CHANGE UP (ref 3.5–5.3)
POTASSIUM SERPL-SCNC: 3.8 MMOL/L — SIGNIFICANT CHANGE UP (ref 3.5–5.3)
SAO2 % BLDA: 96 % — SIGNIFICANT CHANGE UP (ref 92–96)
SODIUM SERPL-SCNC: 143 MMOL/L — SIGNIFICANT CHANGE UP (ref 135–145)
VANCOMYCIN TROUGH SERPL-MCNC: 19 UG/ML — SIGNIFICANT CHANGE UP (ref 10–20)

## 2018-01-17 PROCEDURE — 71045 X-RAY EXAM CHEST 1 VIEW: CPT | Mod: 26

## 2018-01-17 RX ORDER — FUROSEMIDE 40 MG
40 TABLET ORAL DAILY
Qty: 0 | Refills: 0 | Status: DISCONTINUED | OUTPATIENT
Start: 2018-01-17 | End: 2018-01-17

## 2018-01-17 RX ORDER — FUROSEMIDE 40 MG
40 TABLET ORAL DAILY
Qty: 0 | Refills: 0 | Status: COMPLETED | OUTPATIENT
Start: 2018-01-17 | End: 2018-01-20

## 2018-01-17 RX ADMIN — Medication 3 MILLILITER(S): at 06:03

## 2018-01-17 RX ADMIN — Medication 3 MILLILITER(S): at 00:43

## 2018-01-17 RX ADMIN — Medication 200 MILLIGRAM(S): at 22:57

## 2018-01-17 RX ADMIN — Medication 30 MILLIGRAM(S): at 23:03

## 2018-01-17 RX ADMIN — Medication 150 MILLIGRAM(S): at 18:28

## 2018-01-17 RX ADMIN — AZITHROMYCIN 500 MILLIGRAM(S): 500 TABLET, FILM COATED ORAL at 12:34

## 2018-01-17 RX ADMIN — Medication 81 MILLIGRAM(S): at 12:34

## 2018-01-17 RX ADMIN — SODIUM CHLORIDE 75 MILLILITER(S): 9 INJECTION, SOLUTION INTRAVENOUS at 14:27

## 2018-01-17 RX ADMIN — MEROPENEM 100 MILLIGRAM(S): 1 INJECTION INTRAVENOUS at 22:57

## 2018-01-17 RX ADMIN — ATENOLOL 50 MILLIGRAM(S): 25 TABLET ORAL at 05:33

## 2018-01-17 RX ADMIN — Medication 30 MILLIGRAM(S): at 12:34

## 2018-01-17 RX ADMIN — MEROPENEM 100 MILLIGRAM(S): 1 INJECTION INTRAVENOUS at 14:27

## 2018-01-17 RX ADMIN — Medication 200 MILLIGRAM(S): at 12:34

## 2018-01-17 RX ADMIN — Medication 40 MILLIGRAM(S): at 22:00

## 2018-01-17 RX ADMIN — Medication 200 MILLIGRAM(S): at 14:30

## 2018-01-17 RX ADMIN — PANTOPRAZOLE SODIUM 40 MILLIGRAM(S): 20 TABLET, DELAYED RELEASE ORAL at 18:19

## 2018-01-17 RX ADMIN — Medication 3 MILLILITER(S): at 23:58

## 2018-01-17 RX ADMIN — Medication 1 TABLET(S): at 18:18

## 2018-01-17 RX ADMIN — Medication 0.5 MILLIGRAM(S): at 06:03

## 2018-01-17 RX ADMIN — Medication 1 TABLET(S): at 05:33

## 2018-01-17 RX ADMIN — MEROPENEM 100 MILLIGRAM(S): 1 INJECTION INTRAVENOUS at 05:35

## 2018-01-17 RX ADMIN — Medication 3 MILLILITER(S): at 17:29

## 2018-01-17 RX ADMIN — Medication 150 MILLIGRAM(S): at 05:35

## 2018-01-17 RX ADMIN — PANTOPRAZOLE SODIUM 40 MILLIGRAM(S): 20 TABLET, DELAYED RELEASE ORAL at 05:34

## 2018-01-17 RX ADMIN — ATORVASTATIN CALCIUM 20 MILLIGRAM(S): 80 TABLET, FILM COATED ORAL at 22:57

## 2018-01-17 RX ADMIN — Medication 0.5 MILLIGRAM(S): at 17:31

## 2018-01-17 RX ADMIN — Medication 3 MILLILITER(S): at 11:30

## 2018-01-17 RX ADMIN — Medication 200 MILLIGRAM(S): at 18:19

## 2018-01-17 RX ADMIN — Medication 200 MILLIGRAM(S): at 05:32

## 2018-01-17 NOTE — PROGRESS NOTE ADULT - SUBJECTIVE AND OBJECTIVE BOX
BREATHING COMFORTABLY  NO JVD  COARSE BREATH SOUNDS  SOFT S1  NO EDEMA    STABLE CARDIOVASCULAR STATUS; CONTINUING WITH PRESENT MANAGEMENT.

## 2018-01-17 NOTE — PROGRESS NOTE ADULT - SUBJECTIVE AND OBJECTIVE BOX
Vital Signs Last 24 Hrs  T(C): 35.7 (17 Jan 2018 20:33), Max: 37.2 (17 Jan 2018 05:21)  T(F): 96.2 (17 Jan 2018 20:33), Max: 98.9 (17 Jan 2018 05:21)  HR: 84 (17 Jan 2018 21:12) (58 - 84)  BP: 172/80 (17 Jan 2018 20:33) (132/53 - 172/80)  BP(mean): --  RR: 17 (17 Jan 2018 20:33) (16 - 18)  SpO2: 95% (17 Jan 2018 21:12) (95% - 100%)  Physical Exam  patient lethargic in mild respiratory distress   HEENT - EOMI, PERRLA ,neck supple , No JVD  lungs - decreased BS, no wheezing ,occasional  ronchi   COR- [id number] , no murmer  Abd- soft, BS+, no tenderness, no guarding   ext- ecc neg, good pulses  Neuro - Alert , oriented X3   Skin- No rashes   MEDICATIONS  (STANDING):  ALBUTerol/ipratropium for Nebulization 3 milliLiter(s) Nebulizer every 6 hours  aspirin enteric coated 81 milliGRAM(s) Oral daily  ATENolol  Tablet 50 milliGRAM(s) Oral daily  atorvastatin 20 milliGRAM(s) Oral at bedtime  azithromycin   Tablet 500 milliGRAM(s) Oral daily  buDESOnide   0.5 milliGRAM(s) Respule 0.5 milliGRAM(s) Inhalation every 12 hours  furosemide   Injectable 40 milliGRAM(s) IV Push daily  guaiFENesin    Syrup 200 milliGRAM(s) Oral every 4 hours  lactobacillus acidophilus 1 Tablet(s) Oral every 12 hours  meropenem IVPB      meropenem IVPB 1000 milliGRAM(s) IV Intermittent every 8 hours  oseltamivir 30 milliGRAM(s) Oral two times a day  pantoprazole  Injectable 40 milliGRAM(s) IV Push every 12 hours  vancomycin  IVPB 750 milliGRAM(s) IV Intermittent every 12 hours    MEDICATIONS  (PRN):  acetaminophen   Tablet. 650 milliGRAM(s) Oral every 6 hours PRN Moderate Pain (4 - 6)  acetaminophen  Suppository 650 milliGRAM(s) Rectal every 6 hours PRN For Temp greater than 38 C (100.4 F)  ABG - ( 17 Jan 2018 09:49 )  pH: x     /  pCO2: 41    /  pO2: 75    / HCO3: 33    / Base Excess: 9.7   /  SaO2: 96                                    11.7   10.8  )-----------( 189      ( 16 Jan 2018 08:14 )             34.7   01-17    143  |  104  |  11  ----------------------------<  102<H>  3.8   |  32<H>  |  0.71    Ca    7.6<L>      17 Jan 2018 06:30      CXR- bilateral pleural effusion

## 2018-01-17 NOTE — PROGRESS NOTE ADULT - SUBJECTIVE AND OBJECTIVE BOX
TMAX - 98.9    On day # 6 Meropenem / # 6 Vanco / # 7 Zithromax / # 6 Tamiflu    Vital Signs Last 24 Hrs  T(C): 36.6 (2018 11:00), Max: 37.2 (2018 05:21)  T(F): 97.8 (2018 11:00), Max: 98.9 (2018 05:21)  HR: 58 (2018 17:31) (58 - 85)  BP: 132/53 (2018 11:00) (132/53 - 159/74)  BP(mean): --  RR: 16 (2018 11:00) (16 - 18)  SpO2: 98% (2018 17:31) (95% - 100%)  Supplemental O2:  on 3 Liters NC O2 now    Arousable, no c/o cp or abdominal pain now, and claims SOB is decreased as well as decrease in cough.  C/o fatigue today and poor appetite  - claims he is not hungry today.      PHYSICAL EXAM  General:  arousable, sitting semi-upright in bed now with nebulizer rx in progress, cooperative, in NAD  HEENT:  conj pink, sclerae anicteric, PERRLA, no oral lesions noted  Neck:  supple, no nodes noted  Heart: RR   Lungs:  bilateral moist rhonchi throughout, no wheezing noted  Abdomen:  soft, BS+, nontender to palpation                   no CVA or Spinal tenderness noted  Extremities:  trace edema LE's                      no calf tenderness noted  Skin:  warm, dry, no rash noted        I&O's Summary :    2018 07:  -  2018 07:00  --------------------------------------------------------  IN: 1560 mL / OUT: 600 mL / NET: 960 mL    2018 07:  -  2018 17:44  --------------------------------------------------------  IN: 420 mL / OUT: 500 mL / NET: -80 mL      LABS:  CBC Full  -  ( 2018 08:14 )  WBC Count : 10.8 K/uL  Hemoglobin : 11.7 g/dL  Hematocrit : 34.7 %  Platelet Count - Automated : 189 K/uL  Mean Cell Volume : 93.7 fl  Mean Cell Hemoglobin : 31.6 pg  Mean Cell Hemoglobin Concentration : 33.8 gm/dL  Auto Neutrophil # : x  Auto Lymphocyte # : x  Auto Monocyte # : x  Auto Eosinophil # : x  Auto Basophil # : x  Auto Neutrophil % : x  Auto Lymphocyte % : x  Auto Monocyte % : x  Auto Eosinophil % : x  Auto Basophil % : x        143  |  104  |  11  ----------------------------<  102<H>  3.8   |  32<H>  |  0.71    Ca    7.6<L>      2018 06:30        ABG - ( 2018 09:49 )  pH: x     /  pCO2: 41    /  pO2: 75    / HCO3: 33    / Base Excess: 9.7   /  SaO2: 96          Sedimentation Rate, Erythrocyte: 28 mm/hr ( @ 06:49)      Vancomycin Level, Trough: 19.0 ug/mL ( @ 16:18)        MICROBIOLOGY:  Specimen Source: .Sputum Sputum ( @ 14:43)  Culture Results:   Normal Respiratory Sayda present ( @ 14:43)    Chlamydia pneumoniae Ig:256 titer ( @ 09:33)  Chlamydia pneumoniae IgM: <1:10 titer ( @ 09:33)  Chlamydia pneumoniae IgA: 1:16 titer ( @ 09:33)    Specimen Source: .Urine Clean Catch (Midstream) ( @ 16:14)  Culture Results:   No growth ( @ 16:14)    Specimen Source: .Blood Blood-Peripheral ( @ 11:06)  Culture Results:   No growth at 5 days. ( @ 11:06)    Specimen Source: .Blood Blood-Peripheral ( @ 11:06)  Culture Results:   Growth in aerobic bottle: Coag Negative Staphylococcus  Single set isolate, possible contaminant. Contact  Microbiology if susceptibility testing clinically  indicated.              Radiology:   CXR - 18 --< from: Xray Chest 1 View AP -PORTABLE-Routine (01.17.18 @ 09:52) >  FINDINGS:   The cardiomediastinal silhouette is within normal limits.   There is interval increase in bibasilar haziness - probably due to   worsening of pneumonia.  Small bilateral pleural effusions persist - with associated bibasilar   atelectases.  No pneumothorax.  There are degenerative changes of the thoracic spine.    IMPRESSION:   Interval increase/ worsening of bibasilar pneumonia.  Persistent, small bilateral pleural effusions.         Impression:  Slowly improving on present ab rx with Meropenem + Vanco + Zithromax + Tamiflu for        Suggestions:

## 2018-01-17 NOTE — PROGRESS NOTE ADULT - ASSESSMENT
Pneumonia   bilateral pleural effusion  Influenza  Recommendations  continue oxygen via Nasal Cannula   Discontinue BIPAP   continue bronchodilators   Discontinue IV fluids   lasix 40mg IVP   BNP, ECHO and procalcitonin   continue Antibiotics and Tamiflu as per ID

## 2018-01-17 NOTE — PROGRESS NOTE ADULT - SUBJECTIVE AND OBJECTIVE BOX
Some improvement  VSS  c/o frequency  Abd - dullness upon percussion suprapubic, tenderness  Bladder scan stat  CPM

## 2018-01-18 LAB
ANION GAP SERPL CALC-SCNC: 8 MMOL/L — SIGNIFICANT CHANGE UP (ref 5–17)
BUN SERPL-MCNC: 11 MG/DL — SIGNIFICANT CHANGE UP (ref 7–23)
CALCIUM SERPL-MCNC: 7.8 MG/DL — LOW (ref 8.5–10.1)
CHLORIDE SERPL-SCNC: 96 MMOL/L — SIGNIFICANT CHANGE UP (ref 96–108)
CO2 SERPL-SCNC: 35 MMOL/L — HIGH (ref 22–31)
CREAT SERPL-MCNC: 0.7 MG/DL — SIGNIFICANT CHANGE UP (ref 0.5–1.3)
GLUCOSE SERPL-MCNC: 95 MG/DL — SIGNIFICANT CHANGE UP (ref 70–99)
NT-PROBNP SERPL-SCNC: 3208 PG/ML — HIGH (ref 0–450)
POTASSIUM SERPL-MCNC: 3.4 MMOL/L — LOW (ref 3.5–5.3)
POTASSIUM SERPL-SCNC: 3.4 MMOL/L — LOW (ref 3.5–5.3)
SODIUM SERPL-SCNC: 139 MMOL/L — SIGNIFICANT CHANGE UP (ref 135–145)

## 2018-01-18 RX ORDER — POTASSIUM CHLORIDE 20 MEQ
20 PACKET (EA) ORAL ONCE
Qty: 0 | Refills: 0 | Status: COMPLETED | OUTPATIENT
Start: 2018-01-18 | End: 2018-01-18

## 2018-01-18 RX ADMIN — MEROPENEM 100 MILLIGRAM(S): 1 INJECTION INTRAVENOUS at 13:41

## 2018-01-18 RX ADMIN — MEROPENEM 100 MILLIGRAM(S): 1 INJECTION INTRAVENOUS at 05:28

## 2018-01-18 RX ADMIN — Medication 81 MILLIGRAM(S): at 12:41

## 2018-01-18 RX ADMIN — Medication 200 MILLIGRAM(S): at 06:48

## 2018-01-18 RX ADMIN — ATENOLOL 50 MILLIGRAM(S): 25 TABLET ORAL at 06:48

## 2018-01-18 RX ADMIN — Medication 200 MILLIGRAM(S): at 22:19

## 2018-01-18 RX ADMIN — Medication 20 MILLIEQUIVALENT(S): at 12:43

## 2018-01-18 RX ADMIN — Medication 150 MILLIGRAM(S): at 06:50

## 2018-01-18 RX ADMIN — PANTOPRAZOLE SODIUM 40 MILLIGRAM(S): 20 TABLET, DELAYED RELEASE ORAL at 17:37

## 2018-01-18 RX ADMIN — Medication 3 MILLILITER(S): at 17:20

## 2018-01-18 RX ADMIN — Medication 3 MILLILITER(S): at 05:50

## 2018-01-18 RX ADMIN — Medication 3 MILLILITER(S): at 23:33

## 2018-01-18 RX ADMIN — Medication 3 MILLILITER(S): at 11:04

## 2018-01-18 RX ADMIN — Medication 200 MILLIGRAM(S): at 12:39

## 2018-01-18 RX ADMIN — Medication 1 TABLET(S): at 18:47

## 2018-01-18 RX ADMIN — AZITHROMYCIN 500 MILLIGRAM(S): 500 TABLET, FILM COATED ORAL at 12:40

## 2018-01-18 RX ADMIN — Medication 150 MILLIGRAM(S): at 18:44

## 2018-01-18 RX ADMIN — Medication 30 MILLIGRAM(S): at 12:40

## 2018-01-18 RX ADMIN — Medication 0.5 MILLIGRAM(S): at 17:22

## 2018-01-18 RX ADMIN — Medication 1 TABLET(S): at 06:48

## 2018-01-18 RX ADMIN — Medication 200 MILLIGRAM(S): at 17:36

## 2018-01-18 RX ADMIN — MEROPENEM 100 MILLIGRAM(S): 1 INJECTION INTRAVENOUS at 22:19

## 2018-01-18 RX ADMIN — Medication 0.5 MILLIGRAM(S): at 05:50

## 2018-01-18 RX ADMIN — PANTOPRAZOLE SODIUM 40 MILLIGRAM(S): 20 TABLET, DELAYED RELEASE ORAL at 06:48

## 2018-01-18 RX ADMIN — ATORVASTATIN CALCIUM 20 MILLIGRAM(S): 80 TABLET, FILM COATED ORAL at 22:19

## 2018-01-18 NOTE — DIETITIAN INITIAL EVALUATION ADULT. - NS AS NUTRI INTERV MEALS SNACK
Texture-modified diet/Continue Dysphagia 2 diet - [facility] soft c nectar thick liquids as recommended by SLP

## 2018-01-18 NOTE — PROGRESS NOTE ADULT - SUBJECTIVE AND OBJECTIVE BOX
APPEARS COMFORTALBE WITH NONLABORED BREATHING  NO JVD  COARSE BREATH SOUNDS  SOFT S1  NO EDEMA    STABLE CARDIOVASCULAR STATUS; CONTINUING WITH PRESENT MANAGEMENT.   ON  ANTIBIOTICS FOR PNEUMONIA

## 2018-01-18 NOTE — CHART NOTE - NSCHARTNOTEFT_GEN_A_CORE
Upon Nutritional Assessment by the Registered Dietitian your patient was determined to meet criteria / has evidence of the following diagnosis/diagnoses:          [ ]  Mild Protein Calorie Malnutrition        [ ]  Moderate Protein Calorie Malnutrition        [X ] Severe Protein Calorie Malnutrition        [ ] Unspecified Protein Calorie Malnutrition        [ ] Underweight / BMI <19        [ ] Morbid Obesity / BMI > 40      Findings as based on:  •  Comprehensive nutrition assessment and consultation  •  Calorie counts (nutrient intake analysis)  •  Food acceptance and intake status from observations by staff  •  Follow up  •  Patient education  •  Intervention secondary to interdisciplinary rounds  •   concerns      Treatment:    The following diet has been recommended:    Continue Dysphagia 2 - mechanical soft/nectar-thick liquids per SLP recommendation; add Ensure Pudding x 3/day (provides 510 kcal, 12 g protein) for additional nutrition support & pt enjoyment.       PROVIDER Section:     By signing this assessment you are acknowledging and agree with the diagnosis/diagnoses assigned by the Registered Dietitian    Comments:

## 2018-01-18 NOTE — PROGRESS NOTE ADULT - SUBJECTIVE AND OBJECTIVE BOX
Vital Signs Last 24 Hrs  T(C): 36 (18 Jan 2018 17:34), Max: 36.7 (18 Jan 2018 12:25)  T(F): 96.8 (18 Jan 2018 17:34), Max: 98 (18 Jan 2018 12:25)  HR: 88 (18 Jan 2018 17:40) (54 - 92)  BP: 134/64 (18 Jan 2018 17:34) (97/52 - 172/80)  BP(mean): --  RR: 21 (18 Jan 2018 17:34) (17 - 21)  SpO2: 92% (18 Jan 2018 17:40) (92% - 97%)  Physical Exam  patient lying in bed in no respiratory distress, looks better than yesterday  HEENT - EOMI, PERRLA ,neck supple , No JVD  lungs - decreased BSoccasional ronchi  COR- [id number] , no murmer  Abd- soft, BS+, no tenderness, no guarding   ext- ecc neg, good pulses  Neuro - Alert , oriented X3   Skin- No rashes   MEDICATIONS  (STANDING):  ALBUTerol/ipratropium for Nebulization 3 milliLiter(s) Nebulizer every 6 hours  aspirin enteric coated 81 milliGRAM(s) Oral daily  ATENolol  Tablet 50 milliGRAM(s) Oral daily  atorvastatin 20 milliGRAM(s) Oral at bedtime  azithromycin   Tablet 500 milliGRAM(s) Oral daily  buDESOnide   0.5 milliGRAM(s) Respule 0.5 milliGRAM(s) Inhalation every 12 hours  furosemide   Injectable 40 milliGRAM(s) IV Push daily  guaiFENesin    Syrup 200 milliGRAM(s) Oral every 4 hours  lactobacillus acidophilus 1 Tablet(s) Oral every 12 hours  meropenem IVPB      meropenem IVPB 1000 milliGRAM(s) IV Intermittent every 8 hours  oseltamivir 30 milliGRAM(s) Oral two times a day  pantoprazole  Injectable 40 milliGRAM(s) IV Push every 12 hours  vancomycin  IVPB 750 milliGRAM(s) IV Intermittent every 12 hours    MEDICATIONS  (PRN):  acetaminophen   Tablet. 650 milliGRAM(s) Oral every 6 hours PRN Moderate Pain (4 - 6)  acetaminophen  Suppository 650 milliGRAM(s) Rectal every 6 hours PRN For Temp greater than 38 C (100.4 F)  ABG - ( 17 Jan 2018 09:49 )  pH: x     /  pCO2: 41    /  pO2: 75    / HCO3: 33    / Base Excess: 9.7   /  SaO2: 96              01-18    139  |  96  |  11  ----------------------------<  95  3.4<L>   |  35<H>  |  0.70    Ca    7.8<L>      18 Jan 2018 07:08

## 2018-01-18 NOTE — DIETITIAN INITIAL EVALUATION ADULT. - PERTINENT LABORATORY DATA
01-18 Na139 mmol/L Glu 95 mg/dL K+ 3.4 mmol/L<L> Cr  0.70 mg/dL BUN 11 mg/dL Phos n/a   Alb n/a   PAB n/a

## 2018-01-18 NOTE — DIETITIAN INITIAL EVALUATION ADULT. - PHYSICAL APPEARANCE
underweight/BMI = 26.6; no edema noted but b/l appear edematous; Nutrition focused physical exam conducted ; found signs of malnutrition [ ]absent [X ]present.  Subcutaneous fat loss: [moderate ] Orbital fat pads region, [moderate ]Buccal fat region, [unable ]Triceps region,  [WDL ]Ribs region.  Muscle wasting: [moderate ]Temples region, [moderate ]Clavicle region, [moderate ]Shoulder region, [unable ]Scapula region, [unable ]Interosseous region,  [WDL ]thigh region, [WDL ]Calf region

## 2018-01-18 NOTE — DIETITIAN INITIAL EVALUATION ADULT. - ENERGY NEEDS
Height (cm): 167.64 (01-11)  Weight (kg): 74.8 (01-11)  BMI (kg/m2): 26.6 (01-11)  IBW:  64.5 kg +/- 10%     % IBW: 116%       UBW: unknown      %UBW: unknown

## 2018-01-18 NOTE — PROGRESS NOTE ADULT - ASSESSMENT
bilateral pleural effusion ? secondary to congestive heart failure - increase pro BNP   Pneumonia - improving   influenza  hypokalemia    Recommendations    continue lasix   check ECHO   repeat CXR in AM   K suppliment   Antibiotics and antiviral as per ID   continue oxygen via Nasal Cannula

## 2018-01-18 NOTE — CHART NOTE - NSCHARTNOTEFT_GEN_A_CORE
Medicine Hospitalist PA    Requested to place ortiz catheter. Bladder scan showed post residual urine >400mL. Sterile technique used. Ortiz catheter 14 Arabic placed. Urine output noted. RN aware. Continue to monitor output.

## 2018-01-18 NOTE — DIETITIAN INITIAL EVALUATION ADULT. - OTHER INFO
Pt seen for LOS. Unable to interview pt due to cognitive limitation; no family present. Per chart info pt was living alone & independent (still driving despite advanced age) until last month when he was hospitalized for similar issues; following d/c went to [facility] for rehab. Diet there was DASH; ground consistency c nectar liquids. Modified barium swallow done at [facility] (1/4) revealed penetration into airway of thin liquid; swallow done here (1/13) [facility]. No reports of N/V/C/D.  Per MOST form pt DNR/DNI; no tube feeding.

## 2018-01-19 PROCEDURE — 71045 X-RAY EXAM CHEST 1 VIEW: CPT | Mod: 26

## 2018-01-19 PROCEDURE — 93306 TTE W/DOPPLER COMPLETE: CPT | Mod: 26

## 2018-01-19 PROCEDURE — 93010 ELECTROCARDIOGRAM REPORT: CPT

## 2018-01-19 RX ORDER — POTASSIUM CHLORIDE 20 MEQ
20 PACKET (EA) ORAL
Qty: 0 | Refills: 0 | Status: COMPLETED | OUTPATIENT
Start: 2018-01-19 | End: 2018-01-19

## 2018-01-19 RX ADMIN — Medication 200 MILLIGRAM(S): at 22:00

## 2018-01-19 RX ADMIN — Medication 200 MILLIGRAM(S): at 10:04

## 2018-01-19 RX ADMIN — AZITHROMYCIN 500 MILLIGRAM(S): 500 TABLET, FILM COATED ORAL at 12:56

## 2018-01-19 RX ADMIN — Medication 150 MILLIGRAM(S): at 17:57

## 2018-01-19 RX ADMIN — PANTOPRAZOLE SODIUM 40 MILLIGRAM(S): 20 TABLET, DELAYED RELEASE ORAL at 17:57

## 2018-01-19 RX ADMIN — Medication 40 MILLIGRAM(S): at 06:24

## 2018-01-19 RX ADMIN — Medication 0.5 MILLIGRAM(S): at 08:08

## 2018-01-19 RX ADMIN — Medication 81 MILLIGRAM(S): at 12:59

## 2018-01-19 RX ADMIN — Medication 200 MILLIGRAM(S): at 17:58

## 2018-01-19 RX ADMIN — PANTOPRAZOLE SODIUM 40 MILLIGRAM(S): 20 TABLET, DELAYED RELEASE ORAL at 06:24

## 2018-01-19 RX ADMIN — Medication 150 MILLIGRAM(S): at 07:30

## 2018-01-19 RX ADMIN — ATENOLOL 50 MILLIGRAM(S): 25 TABLET ORAL at 06:24

## 2018-01-19 RX ADMIN — Medication 200 MILLIGRAM(S): at 14:57

## 2018-01-19 RX ADMIN — Medication 3 MILLILITER(S): at 23:57

## 2018-01-19 RX ADMIN — Medication 200 MILLIGRAM(S): at 06:25

## 2018-01-19 RX ADMIN — Medication 20 MILLIEQUIVALENT(S): at 12:59

## 2018-01-19 RX ADMIN — MEROPENEM 100 MILLIGRAM(S): 1 INJECTION INTRAVENOUS at 06:25

## 2018-01-19 RX ADMIN — Medication 0.5 MILLIGRAM(S): at 17:17

## 2018-01-19 RX ADMIN — MEROPENEM 100 MILLIGRAM(S): 1 INJECTION INTRAVENOUS at 14:57

## 2018-01-19 RX ADMIN — Medication 30 MILLIGRAM(S): at 00:46

## 2018-01-19 RX ADMIN — Medication 3 MILLILITER(S): at 05:49

## 2018-01-19 RX ADMIN — ATORVASTATIN CALCIUM 20 MILLIGRAM(S): 80 TABLET, FILM COATED ORAL at 22:00

## 2018-01-19 RX ADMIN — MEROPENEM 100 MILLIGRAM(S): 1 INJECTION INTRAVENOUS at 22:00

## 2018-01-19 RX ADMIN — Medication 1 TABLET(S): at 06:26

## 2018-01-19 RX ADMIN — Medication 20 MILLIEQUIVALENT(S): at 14:58

## 2018-01-19 RX ADMIN — Medication 1 TABLET(S): at 17:57

## 2018-01-19 RX ADMIN — Medication 30 MILLIGRAM(S): at 12:56

## 2018-01-19 RX ADMIN — Medication 3 MILLILITER(S): at 17:16

## 2018-01-19 NOTE — PROGRESS NOTE ADULT - SUBJECTIVE AND OBJECTIVE BOX
TMAX - 99.8    On day # 8 Meropenem / # 8 Vanco / #9 Zithromax / # 8 Tamiflu    Vital Signs Last 24 Hrs  T(C): 36 (19 Jan 2018 17:07), Max: 37.7 (19 Jan 2018 05:28)  T(F): 96.8 (19 Jan 2018 17:07), Max: 99.8 (19 Jan 2018 05:28)  HR: 69 (19 Jan 2018 17:07) (69 - 102)  BP: 119/51 (19 Jan 2018 17:07) (114/49 - 134/64)  BP(mean): --  RR: 22 (19 Jan 2018 17:07) (20 - 22)  SpO2: 93% (19 Jan 2018 11:40) (92% - 98%)  Supplemental O2:  on NC O2 now    Awakens, no c/o cp, still with some SOB but improved and claims that his cough is decreasing.      PHYSICAL EXAM  General:  awakens, sitting up in bed, appears fatigued, cooperative though and in NAD, with nebulizer rx in progress although noted with christophe upper airway congestion  HEENT:  conj pink, sclerae anicteric, PERRLA, no oral lesions noted               Lt scalp suture line with staples intact, eschar at site but no erythema or drainage noted  Neck:  supple, no nodes noted  Heart:  RR  Lungs:  decreased BS at bases bilaterally with moist rhonchi scattered bilaterally  Abdomen: soft, BS +, nontender to palpation                  no CVA or Spinal tenderness elicited   Extremities:  trace edema LE's  Skin: warm, dry, no rash noted         I&O's Summary :    18 Jan 2018 07:01  -  19 Jan 2018 07:00  --------------------------------------------------------  IN: 260 mL / OUT: 200 mL / NET: 60 mL    19 Jan 2018 07:01  -  19 Jan 2018 17:28  --------------------------------------------------------  IN: 360 mL / OUT: 700 mL / NET: -340 mL        LABS:  01-18    139  |  96  |  11  ----------------------------<  95  3.4<L>   |  35<H>  |  0.70    Ca    7.8<L>      18 Jan 2018 07:08      Vancomycin Level, Trough: 19.0 ug/mL (01-17 @ 16:18)         Radiology:  CXR - 1/19 -  < from: Xray Chest 1 View AP -PORTABLE-Routine (01.19.18 @ 08:04) >  COMPARISON: 1/17/2018    FINDINGS:        There are bibasilar lung opacities and pleural effusions. The cardiac and   mediastinal contours are prominent, which may be due to magnification   from AP technique and shallow inspiration. The osseous structures are   intact.    IMPRESSION:    Bibasilar lung opacities and pleural effusions.          Impression:  Slowly improving on present ab rx with Meropenem + Vanco + Zithromax + Tamiflu for rx of  Sepsis secondary to Influenza AH3 and  now Bilateral PNA as seen on CXR.      Suggestions: Will continues present ab rx and d/c Tamiflu now as patient has completed his course of rx for Influenza AH3.  Follow-up temps, labs, and CXR.  Discussed with patient at bedside.

## 2018-01-19 NOTE — PROGRESS NOTE ADULT - SUBJECTIVE AND OBJECTIVE BOX
· Assessment		  bilateral pleural effusion ? secondary to congestive heart failure - increase pro BNP   Pneumonia - improving   influenza  hypokalemia    Recommendations    continue lasix   check ECHO   repeat CXR in AM   K suppliment   Antibiotics and antiviral as per ID   continue oxygen via Nasal Cannula   OOB to chair  PT daily

## 2018-01-19 NOTE — PROGRESS NOTE ADULT - SUBJECTIVE AND OBJECTIVE BOX
PULMONARY NOTED  CHEST XRAY, LABS REV'D  COARSE , LOUD RHONCHI  DISTANT S1, ?IRREGULAR?  NO EDEMA    AGREE WITH DIURESIS  WILL CHECK ECHO AND ECG  ON ANTIBIOTICS AND BRONCHODILATORS  DNR PULMONARY NOTED  CHEST XRAY, LABS REV'D  COARSE , LOUD RHONCHI  DISTANT S1, ?IRREGULAR?  NO EDEMA    AGREE WITH DIURESIS  WILL CHECK ECHO AND ECG  ON ANTIBIOTICS AND BRONCHODILATORS  DNR    ADDENDA:    ECHO (PERSONAL REVIEW): NORMAL SINUS WITH APCS'S; NORMAL TO HYPERDYNAMIC LV SYSTOLIC FUNCTION; NO SIGNIFICANT VALVULAR PATHOLOGY; NO PERICARDIAL EFFUSION    TO CONTINUE WITH AS ABOVE

## 2018-01-19 NOTE — PROGRESS NOTE ADULT - SUBJECTIVE AND OBJECTIVE BOX
Vital Signs Last 24 Hrs  T(C): 37.1 (19 Jan 2018 11:40), Max: 37.7 (19 Jan 2018 05:28)  T(F): 98.7 (19 Jan 2018 11:40), Max: 99.8 (19 Jan 2018 05:28)  HR: 85 (19 Jan 2018 11:40) (85 - 102)  BP: 114/49 (19 Jan 2018 11:40) (114/49 - 134/64)  BP(mean): --  RR: 20 (19 Jan 2018 11:40) (20 - 21)  SpO2: 93% (19 Jan 2018 11:40) (92% - 98%)  Physical Exam  patient lying in bed in no respiratory distress  HEENT - EOMI, PERRLA ,neck supple , No JVD  lungs - occasional ronchi  COR- [id number] , no murmer  Abd- soft, BS+, no tenderness, no guarding   ext- ecc neg, good pulses  Neuro - Alert , oriented X3   Skin- No rashes   MEDICATIONS  (STANDING):  ALBUTerol/ipratropium for Nebulization 3 milliLiter(s) Nebulizer every 6 hours  aspirin enteric coated 81 milliGRAM(s) Oral daily  ATENolol  Tablet 50 milliGRAM(s) Oral daily  atorvastatin 20 milliGRAM(s) Oral at bedtime  azithromycin   Tablet 500 milliGRAM(s) Oral daily  buDESOnide   0.5 milliGRAM(s) Respule 0.5 milliGRAM(s) Inhalation every 12 hours  furosemide   Injectable 40 milliGRAM(s) IV Push daily  guaiFENesin    Syrup 200 milliGRAM(s) Oral every 4 hours  lactobacillus acidophilus 1 Tablet(s) Oral every 12 hours  meropenem IVPB      meropenem IVPB 1000 milliGRAM(s) IV Intermittent every 8 hours  oseltamivir 30 milliGRAM(s) Oral two times a day  pantoprazole  Injectable 40 milliGRAM(s) IV Push every 12 hours  vancomycin  IVPB 750 milliGRAM(s) IV Intermittent every 12 hours    MEDICATIONS  (PRN):  acetaminophen   Tablet. 650 milliGRAM(s) Oral every 6 hours PRN Moderate Pain (4 - 6)  acetaminophen  Suppository 650 milliGRAM(s) Rectal every 6 hours PRN For Temp greater than 38 C (100.4 F)  01-18    139  |  96  |  11  ----------------------------<  95  3.4<L>   |  35<H>  |  0.70    Ca    7.8<L>      18 Jan 2018 07:08        CXR- bilateral pleural effusion improved

## 2018-01-19 NOTE — PROGRESS NOTE ADULT - ASSESSMENT
bilateral pleural effusion   Antibiotics   Hypokalemia    Recommendations  continue lasix   Antibiotics as per ID   K suppliment   bronchodilators

## 2018-01-20 LAB
ANION GAP SERPL CALC-SCNC: 7 MMOL/L — SIGNIFICANT CHANGE UP (ref 5–17)
BASOPHILS # BLD AUTO: 0.1 K/UL — SIGNIFICANT CHANGE UP (ref 0–0.2)
BASOPHILS NFR BLD AUTO: 0.5 % — SIGNIFICANT CHANGE UP (ref 0–2)
BUN SERPL-MCNC: 26 MG/DL — HIGH (ref 7–23)
CALCIUM SERPL-MCNC: 8.2 MG/DL — LOW (ref 8.5–10.1)
CHLORIDE SERPL-SCNC: 99 MMOL/L — SIGNIFICANT CHANGE UP (ref 96–108)
CO2 SERPL-SCNC: 34 MMOL/L — HIGH (ref 22–31)
CREAT SERPL-MCNC: 1.01 MG/DL — SIGNIFICANT CHANGE UP (ref 0.5–1.3)
EOSINOPHIL # BLD AUTO: 0.3 K/UL — SIGNIFICANT CHANGE UP (ref 0–0.5)
EOSINOPHIL NFR BLD AUTO: 1.9 % — SIGNIFICANT CHANGE UP (ref 0–6)
ERYTHROCYTE [SEDIMENTATION RATE] IN BLOOD: 41 MM/HR — HIGH (ref 0–20)
GLUCOSE SERPL-MCNC: 104 MG/DL — HIGH (ref 70–99)
HCT VFR BLD CALC: 34.8 % — LOW (ref 39–50)
HGB BLD-MCNC: 12 G/DL — LOW (ref 13–17)
LYMPHOCYTES # BLD AUTO: 16.3 % — SIGNIFICANT CHANGE UP (ref 13–44)
LYMPHOCYTES # BLD AUTO: 2.9 K/UL — SIGNIFICANT CHANGE UP (ref 1–3.3)
MCHC RBC-ENTMCNC: 31.8 PG — SIGNIFICANT CHANGE UP (ref 27–34)
MCHC RBC-ENTMCNC: 34.6 GM/DL — SIGNIFICANT CHANGE UP (ref 32–36)
MCV RBC AUTO: 92 FL — SIGNIFICANT CHANGE UP (ref 80–100)
MONOCYTES # BLD AUTO: 1.7 K/UL — HIGH (ref 0–0.9)
MONOCYTES NFR BLD AUTO: 9.4 % — SIGNIFICANT CHANGE UP (ref 2–14)
NEUTROPHILS # BLD AUTO: 12.9 K/UL — HIGH (ref 1.8–7.4)
NEUTROPHILS NFR BLD AUTO: 71.8 % — SIGNIFICANT CHANGE UP (ref 43–77)
PLATELET # BLD AUTO: 290 K/UL — SIGNIFICANT CHANGE UP (ref 150–400)
POTASSIUM SERPL-MCNC: 3.6 MMOL/L — SIGNIFICANT CHANGE UP (ref 3.5–5.3)
POTASSIUM SERPL-SCNC: 3.6 MMOL/L — SIGNIFICANT CHANGE UP (ref 3.5–5.3)
RBC # BLD: 3.78 M/UL — LOW (ref 4.2–5.8)
RBC # FLD: 12.4 % — SIGNIFICANT CHANGE UP (ref 11–15)
SODIUM SERPL-SCNC: 140 MMOL/L — SIGNIFICANT CHANGE UP (ref 135–145)
VANCOMYCIN TROUGH SERPL-MCNC: 25.2 UG/ML — CRITICAL HIGH (ref 10–20)
WBC # BLD: 17.9 K/UL — HIGH (ref 3.8–10.5)
WBC # FLD AUTO: 17.9 K/UL — HIGH (ref 3.8–10.5)

## 2018-01-20 PROCEDURE — 73501 X-RAY EXAM HIP UNI 1 VIEW: CPT | Mod: 26,LT

## 2018-01-20 RX ORDER — FUROSEMIDE 40 MG
40 TABLET ORAL DAILY
Qty: 0 | Refills: 0 | Status: DISCONTINUED | OUTPATIENT
Start: 2018-01-20 | End: 2018-02-13

## 2018-01-20 RX ORDER — POTASSIUM CHLORIDE 20 MEQ
20 PACKET (EA) ORAL DAILY
Qty: 0 | Refills: 0 | Status: DISCONTINUED | OUTPATIENT
Start: 2018-01-20 | End: 2018-02-13

## 2018-01-20 RX ADMIN — Medication 3 MILLILITER(S): at 12:02

## 2018-01-20 RX ADMIN — Medication 200 MILLIGRAM(S): at 14:29

## 2018-01-20 RX ADMIN — Medication 150 MILLIGRAM(S): at 06:17

## 2018-01-20 RX ADMIN — ATENOLOL 50 MILLIGRAM(S): 25 TABLET ORAL at 06:16

## 2018-01-20 RX ADMIN — Medication 200 MILLIGRAM(S): at 09:39

## 2018-01-20 RX ADMIN — Medication 40 MILLIGRAM(S): at 11:45

## 2018-01-20 RX ADMIN — Medication 650 MILLIGRAM(S): at 15:27

## 2018-01-20 RX ADMIN — Medication 20 MILLIEQUIVALENT(S): at 11:45

## 2018-01-20 RX ADMIN — Medication 1 TABLET(S): at 17:21

## 2018-01-20 RX ADMIN — MEROPENEM 100 MILLIGRAM(S): 1 INJECTION INTRAVENOUS at 22:48

## 2018-01-20 RX ADMIN — PANTOPRAZOLE SODIUM 40 MILLIGRAM(S): 20 TABLET, DELAYED RELEASE ORAL at 17:22

## 2018-01-20 RX ADMIN — Medication 0.5 MILLIGRAM(S): at 05:46

## 2018-01-20 RX ADMIN — Medication 0.5 MILLIGRAM(S): at 18:12

## 2018-01-20 RX ADMIN — Medication 81 MILLIGRAM(S): at 11:45

## 2018-01-20 RX ADMIN — Medication 3 MILLILITER(S): at 05:46

## 2018-01-20 RX ADMIN — ATORVASTATIN CALCIUM 20 MILLIGRAM(S): 80 TABLET, FILM COATED ORAL at 22:49

## 2018-01-20 RX ADMIN — Medication 650 MILLIGRAM(S): at 15:58

## 2018-01-20 RX ADMIN — Medication 200 MILLIGRAM(S): at 17:21

## 2018-01-20 RX ADMIN — Medication 3 MILLILITER(S): at 18:10

## 2018-01-20 RX ADMIN — MEROPENEM 100 MILLIGRAM(S): 1 INJECTION INTRAVENOUS at 06:16

## 2018-01-20 RX ADMIN — Medication 3 MILLILITER(S): at 23:16

## 2018-01-20 RX ADMIN — Medication 200 MILLIGRAM(S): at 02:00

## 2018-01-20 RX ADMIN — AZITHROMYCIN 500 MILLIGRAM(S): 500 TABLET, FILM COATED ORAL at 15:26

## 2018-01-20 RX ADMIN — Medication 40 MILLIGRAM(S): at 06:17

## 2018-01-20 RX ADMIN — Medication 200 MILLIGRAM(S): at 22:48

## 2018-01-20 RX ADMIN — Medication 1 TABLET(S): at 06:17

## 2018-01-20 RX ADMIN — MEROPENEM 100 MILLIGRAM(S): 1 INJECTION INTRAVENOUS at 14:28

## 2018-01-20 RX ADMIN — Medication 200 MILLIGRAM(S): at 06:16

## 2018-01-20 RX ADMIN — PANTOPRAZOLE SODIUM 40 MILLIGRAM(S): 20 TABLET, DELAYED RELEASE ORAL at 06:16

## 2018-01-20 NOTE — PROGRESS NOTE ADULT - SUBJECTIVE AND OBJECTIVE BOX
Patient is a 89y old  Male who presents with a chief complaint of SOB, cough (13 Jan 2018 09:33)      INTERVAL HPI/OVERNIGHT EVENTS: c/o L hip pain    MEDICATIONS  (STANDING):  ALBUTerol/ipratropium for Nebulization 3 milliLiter(s) Nebulizer every 6 hours  aspirin enteric coated 81 milliGRAM(s) Oral daily  ATENolol  Tablet 50 milliGRAM(s) Oral daily  atorvastatin 20 milliGRAM(s) Oral at bedtime  azithromycin   Tablet 500 milliGRAM(s) Oral daily  buDESOnide   0.5 milliGRAM(s) Respule 0.5 milliGRAM(s) Inhalation every 12 hours  furosemide    Tablet 40 milliGRAM(s) Oral daily  guaiFENesin    Syrup 200 milliGRAM(s) Oral every 4 hours  lactobacillus acidophilus 1 Tablet(s) Oral every 12 hours  meropenem IVPB      meropenem IVPB 1000 milliGRAM(s) IV Intermittent every 8 hours  pantoprazole  Injectable 40 milliGRAM(s) IV Push every 12 hours  potassium chloride    Tablet ER 20 milliEquivalent(s) Oral daily  vancomycin  IVPB 750 milliGRAM(s) IV Intermittent every 12 hours    MEDICATIONS  (PRN):  acetaminophen   Tablet. 650 milliGRAM(s) Oral every 6 hours PRN Moderate Pain (4 - 6)  acetaminophen  Suppository 650 milliGRAM(s) Rectal every 6 hours PRN For Temp greater than 38 C (100.4 F)      Allergies    aspirin (Stomach Upset)    Intolerances        REVIEW OF SYSTEMS:        HEENT - wnl  RESPIRATORY: No cough, wheezing, chills or hemoptysis; No shortness of breath  CARDIOVASCULAR: No chest pain, palpitations, dizziness, or leg swelling  GASTROINTESTINAL: No abdominal or epigastric pain. No nausea, vomiting, or hematemesis; No diarrhea or constipation. No melena or hematochezia.  GENITOURINARY: No dysuria, frequency, hematuria, or incontinence  SKIN: No itching, burning, rashes, or lesions   MUSCULOSKELETAL: No joint pain or swelling; No muscle, back, or extremity pain  PSYCHIATRIC: No depression, anxiety, mood swings, or difficulty sleeping        Vital Signs Last 24 Hrs  T(C): 36.3 (20 Jan 2018 05:38), Max: 37.1 (19 Jan 2018 11:40)  T(F): 97.3 (20 Jan 2018 05:38), Max: 98.7 (19 Jan 2018 11:40)  HR: 66 (20 Jan 2018 05:48) (63 - 95)  BP: 164/84 (20 Jan 2018 05:38) (113/43 - 164/84)  BP(mean): --  RR: 18 (20 Jan 2018 05:38) (18 - 23)  SpO2: 93% (20 Jan 2018 05:48) (92% - 98%)    PHYSICAL EXAM:  general       HEENT wnl  CHEST/LUNG: Clear to percussion bilaterally; No rales, rhonchi, wheezing, or rubs  HEART: Regular rate and rhythm; No murmurs, rubs, or gallops  ABDOMEN: Soft, Nontender, Nondistended; Bowel sounds present  EXTREMITIES:  2+ Peripheral Pulses, No clubbing, cyanosis, or edema  LYMPH: No lymphadenopathy noted  SKIN: No rashes or lesions    LABS:                        12.0   17.9  )-----------( 290      ( 20 Jan 2018 06:03 )             34.8     01-20    140  |  99  |  26<H>  ----------------------------<  104<H>  3.6   |  34<H>  |  1.01    Ca    8.2<L>      20 Jan 2018 06:03          CAPILLARY BLOOD GLUCOSE                    RADIOLOGY & ADDITIONAL TESTS:    Imaging Personally Reviewed:  [y ] YES  [ ] NO    Consultant(s) Notes Reviewed:  [y ] YES  [ ] NO    Care Discussed with Consultants/Other Providers [ ] YES  [ ] NO    PROBLEMS:  PNEUMONIA, SEPSIS  DIFFICULTY BREATHING  Pneumonia  Delirium due to another medical condition, acute, mixed level of activity  CHF  L hip pain      Care discussed with family,         [  ]   yes  [  ]  No    imp:    stable[ ]    unstable[  ]     improving [v   ]       unchanged  [  ]    off Tamiflu, on IV AB,   Lasix 40mg po qd + KCl 20meq qd  OOB to santiago  L hip x-ray            Plans:  Continue present plans  [  ]

## 2018-01-20 NOTE — PROVIDER CONTACT NOTE (CRITICAL VALUE NOTIFICATION) - TEST AND RESULT REPORTED:
RVP, and Influenza detected
VANCOMYCIN 25.2
blood culture 1/11/18 preliminary growth in aerobic bottle gram positive cocci in clusters
flu swab positive for A flu

## 2018-01-20 NOTE — PROGRESS NOTE ADULT - SUBJECTIVE AND OBJECTIVE BOX
TMAX - 98.6    On day # 9 Meropenem /  # 9 Vanco /  # 10 Zithromax / Tamiflu rx completed now    Vital Signs Last 24 Hrs  T(C): 36.1 (20 Jan 2018 16:29), Max: 37 (19 Jan 2018 22:28)  T(F): 97 (20 Jan 2018 16:29), Max: 98.6 (19 Jan 2018 22:28)  HR: 75 (20 Jan 2018 16:29) (63 - 95)  BP: 125/48 (20 Jan 2018 16:29) (113/43 - 164/84)  BP(mean): --  RR: 18 (20 Jan 2018 16:29) (18 - 23)  SpO2: 98% (20 Jan 2018 16:29) (92% - 98%)  Supplemental O2:  on NCO2       Awake, alert, c/o low-back discomfort now, but no c/o cp and claims to have less SOB and some decrease in cough now.  Appetite slightly improved.  Asking for his dinner now.  C/o lower back discomfort somewhat improved when the head of the bed was adjusted.      PHYSICAL EXAM  General:  awake, alert, slightly confused to events, sitting up in bed, cooperative and in NAD.  HEENT:  conj pink, sclerae anicteric, PERRLA, no oral lesions noted  Neck:  supple, no nodes noted  Heart:  RR  Lungs:  few bilateral moist rhonchi, but no wheezing noted  Abdomen:  soft, BS +, nontender to palpation  No CVA or Spinal tenderness  Extremities:  trace edema LE's  Skin:  warm, dry. no rash noted        I&O's Summary :    19 Jan 2018 07:01  -  20 Jan 2018 07:00  --------------------------------------------------------  IN: 450 mL / OUT: 700 mL / NET: -250 mL    20 Jan 2018 07:01  -  20 Jan 2018 18:10  --------------------------------------------------------  IN: 40 mL / OUT: 400 mL / NET: -360 mL      LABS:  CBC Full  -  ( 20 Jan 2018 06:03 )  WBC Count : 17.9 K/uL  Hemoglobin : 12.0 g/dL  Hematocrit : 34.8 %  Platelet Count - Automated : 290 K/uL  Mean Cell Volume : 92.0 fl  Mean Cell Hemoglobin : 31.8 pg  Mean Cell Hemoglobin Concentration : 34.6 gm/dL  Auto Neutrophil # : 12.9 K/uL  Auto Lymphocyte # : 2.9 K/uL  Auto Monocyte # : 1.7 K/uL  Auto Eosinophil # : 0.3 K/uL  Auto Basophil # : 0.1 K/uL  Auto Neutrophil % : 71.8 %  Auto Lymphocyte % : 16.3 %  Auto Monocyte % : 9.4 %  Auto Eosinophil % : 1.9 %  Auto Basophil % : 0.5 %    01-20    140  |  99  |  26<H>  ----------------------------<  104<H>  3.6   |  34<H>  |  1.01    Ca    8.2<L>      20 Jan 2018 06:03      Sedimentation Rate, Erythrocyte: 41 mm/hr (01-20 @ 06:03)        Radiology:  2D ECHO -    < from: TTE Echo Doppler w/o Cont (01.19.18 @ 13:57) >  Summary:   1. Left ventricular ejection fraction, by visual estimation, is 55 to   60%.   2. Technically good study.   3. Normal global left ventricular systolic function.   4. Normal left ventricular internal cavity size.   5. Spectral Doppler shows impaired relaxation pattern of left   ventricular myocardial filling (Grade I diastolic dysfunction).   6. There is mild concentric left ventricular hypertrophy.   7. Normal right ventricular size and function.   8. There is no evidence of pericardial effusion.   9. Mild mitral annular calcification.  10. Mild mitral valve regurgitation.  11. Thickening and calcification of the anterior and posterior mitral   valve leaflets.  12. Degenerative tricuspid valve.  13. Mild aortic regurgitation.  14. Sclerotic aortic valve with decreased opening.  15. Estimated pulmonary artery systolic pressure is 38.5 mmHg assuming a   right atrial pressure of 5 mmHg, which is consistent with borderline   pulmonary hypertension.  16. Mildly enlarged left atrium.  17. There is mild aortic root calcification.                         < from: Xray Hip 1 View, Left (01.20.18 @ 15:15) >  CLINICAL INFORMATION: Left hip pain.    No prior studies are available for comparison.    FINDINGS:    The femoral head has a normal contour. There is no acute fracture or   dislocation. The hip joint space is preserved.     IMPRESSION:    No acute fracture or dislocation.        Impression:  Clinically improving slowly on present ab rx with Meropenem + Vanco + Zithromax, s/p course of Tamiflu rx for Sepsis secondary to Influenza AH3 along with Bilateral PNA.  Noted though that WBC's increased today to 17.9 - ? etiology.      Suggestions:  Will continue present ab rx with Meropenem + Vanco and d/c Zithromax after completing today's dose. Repeat CBC in AM to follow-up the WBC's.  If they remain elevated, will re-culture and re-evaluate for source.  Follow-up temps and labs.

## 2018-01-21 LAB
ANION GAP SERPL CALC-SCNC: 6 MMOL/L — SIGNIFICANT CHANGE UP (ref 5–17)
BUN SERPL-MCNC: 29 MG/DL — HIGH (ref 7–23)
CALCIUM SERPL-MCNC: 8 MG/DL — LOW (ref 8.5–10.1)
CHLORIDE SERPL-SCNC: 100 MMOL/L — SIGNIFICANT CHANGE UP (ref 96–108)
CO2 SERPL-SCNC: 35 MMOL/L — HIGH (ref 22–31)
CREAT SERPL-MCNC: 1.1 MG/DL — SIGNIFICANT CHANGE UP (ref 0.5–1.3)
GLUCOSE SERPL-MCNC: 93 MG/DL — SIGNIFICANT CHANGE UP (ref 70–99)
HCT VFR BLD CALC: 34.1 % — LOW (ref 39–50)
HGB BLD-MCNC: 11.7 G/DL — LOW (ref 13–17)
MCHC RBC-ENTMCNC: 31.8 PG — SIGNIFICANT CHANGE UP (ref 27–34)
MCHC RBC-ENTMCNC: 34.4 GM/DL — SIGNIFICANT CHANGE UP (ref 32–36)
MCV RBC AUTO: 92.6 FL — SIGNIFICANT CHANGE UP (ref 80–100)
PLATELET # BLD AUTO: 325 K/UL — SIGNIFICANT CHANGE UP (ref 150–400)
POTASSIUM SERPL-MCNC: 3.6 MMOL/L — SIGNIFICANT CHANGE UP (ref 3.5–5.3)
POTASSIUM SERPL-SCNC: 3.6 MMOL/L — SIGNIFICANT CHANGE UP (ref 3.5–5.3)
RBC # BLD: 3.68 M/UL — LOW (ref 4.2–5.8)
RBC # FLD: 12 % — SIGNIFICANT CHANGE UP (ref 11–15)
SODIUM SERPL-SCNC: 141 MMOL/L — SIGNIFICANT CHANGE UP (ref 135–145)
VANCOMYCIN TROUGH SERPL-MCNC: 21.6 UG/ML — HIGH (ref 10–20)
WBC # BLD: 17.5 K/UL — HIGH (ref 3.8–10.5)
WBC # FLD AUTO: 17.5 K/UL — HIGH (ref 3.8–10.5)

## 2018-01-21 RX ORDER — ACETYLCYSTEINE 200 MG/ML
5 VIAL (ML) MISCELLANEOUS EVERY 6 HOURS
Qty: 0 | Refills: 0 | Status: DISCONTINUED | OUTPATIENT
Start: 2018-01-21 | End: 2018-01-21

## 2018-01-21 RX ORDER — VANCOMYCIN HCL 1 G
500 VIAL (EA) INTRAVENOUS EVERY 12 HOURS
Qty: 0 | Refills: 0 | Status: DISCONTINUED | OUTPATIENT
Start: 2018-01-21 | End: 2018-02-03

## 2018-01-21 RX ORDER — ACETYLCYSTEINE 200 MG/ML
3 VIAL (ML) MISCELLANEOUS EVERY 6 HOURS
Qty: 0 | Refills: 0 | Status: DISCONTINUED | OUTPATIENT
Start: 2018-01-21 | End: 2018-02-09

## 2018-01-21 RX ORDER — ACETYLCYSTEINE 200 MG/ML
5 VIAL (ML) MISCELLANEOUS THREE TIMES A DAY
Qty: 0 | Refills: 0 | Status: DISCONTINUED | OUTPATIENT
Start: 2018-01-21 | End: 2018-01-21

## 2018-01-21 RX ADMIN — Medication 3 MILLILITER(S): at 11:48

## 2018-01-21 RX ADMIN — Medication 40 MILLIGRAM(S): at 06:40

## 2018-01-21 RX ADMIN — Medication 3 MILLILITER(S): at 17:20

## 2018-01-21 RX ADMIN — Medication 3 MILLILITER(S): at 05:16

## 2018-01-21 RX ADMIN — Medication 0.5 MILLIGRAM(S): at 05:16

## 2018-01-21 RX ADMIN — MEROPENEM 100 MILLIGRAM(S): 1 INJECTION INTRAVENOUS at 06:40

## 2018-01-21 RX ADMIN — ATENOLOL 50 MILLIGRAM(S): 25 TABLET ORAL at 06:40

## 2018-01-21 RX ADMIN — Medication 1 TABLET(S): at 06:40

## 2018-01-21 RX ADMIN — Medication 3 MILLILITER(S): at 17:19

## 2018-01-21 RX ADMIN — Medication 200 MILLIGRAM(S): at 06:40

## 2018-01-21 RX ADMIN — PANTOPRAZOLE SODIUM 40 MILLIGRAM(S): 20 TABLET, DELAYED RELEASE ORAL at 06:40

## 2018-01-21 RX ADMIN — Medication 20 MILLIEQUIVALENT(S): at 13:06

## 2018-01-21 RX ADMIN — Medication 1 TABLET(S): at 17:25

## 2018-01-21 RX ADMIN — Medication 200 MILLIGRAM(S): at 22:26

## 2018-01-21 RX ADMIN — ATORVASTATIN CALCIUM 20 MILLIGRAM(S): 80 TABLET, FILM COATED ORAL at 22:26

## 2018-01-21 RX ADMIN — Medication 81 MILLIGRAM(S): at 13:06

## 2018-01-21 RX ADMIN — MEROPENEM 100 MILLIGRAM(S): 1 INJECTION INTRAVENOUS at 15:06

## 2018-01-21 RX ADMIN — Medication 0.5 MILLIGRAM(S): at 17:24

## 2018-01-21 RX ADMIN — PANTOPRAZOLE SODIUM 40 MILLIGRAM(S): 20 TABLET, DELAYED RELEASE ORAL at 17:24

## 2018-01-21 RX ADMIN — Medication 100 MILLIGRAM(S): at 21:34

## 2018-01-21 RX ADMIN — MEROPENEM 100 MILLIGRAM(S): 1 INJECTION INTRAVENOUS at 22:26

## 2018-01-21 NOTE — PROGRESS NOTE ADULT - SUBJECTIVE AND OBJECTIVE BOX
Patient is a 89y old  Male who presents with a chief complaint of SOB, cough (13 Jan 2018 09:33)      INTERVAL HPI/OVERNIGHT EVENTS: more alert more comfortable    MEDICATIONS  (STANDING):  acetylcysteine 10% Inhalation 5 milliLiter(s) Inhalation three times a day  ALBUTerol/ipratropium for Nebulization 3 milliLiter(s) Nebulizer every 6 hours  aspirin enteric coated 81 milliGRAM(s) Oral daily  ATENolol  Tablet 50 milliGRAM(s) Oral daily  atorvastatin 20 milliGRAM(s) Oral at bedtime  buDESOnide   0.5 milliGRAM(s) Respule 0.5 milliGRAM(s) Inhalation every 12 hours  furosemide    Tablet 40 milliGRAM(s) Oral daily  guaiFENesin    Syrup 200 milliGRAM(s) Oral every 4 hours  lactobacillus acidophilus 1 Tablet(s) Oral every 12 hours  meropenem IVPB      meropenem IVPB 1000 milliGRAM(s) IV Intermittent every 8 hours  pantoprazole  Injectable 40 milliGRAM(s) IV Push every 12 hours  potassium chloride    Tablet ER 20 milliEquivalent(s) Oral daily  vancomycin  IVPB 500 milliGRAM(s) IV Intermittent every 12 hours    MEDICATIONS  (PRN):  acetaminophen   Tablet. 650 milliGRAM(s) Oral every 6 hours PRN Moderate Pain (4 - 6)  acetaminophen  Suppository 650 milliGRAM(s) Rectal every 6 hours PRN For Temp greater than 38 C (100.4 F)      Allergies    aspirin (Stomach Upset)    Intolerances        REVIEW OF SYSTEMS:        HEENT - wnl  RESPIRATORY: cough SOB  CARDIOVASCULAR: No chest pain, palpitations, dizziness, or leg swelling  GASTROINTESTINAL: No abdominal or epigastric pain. No nausea, vomiting, or hematemesis; No diarrhea or constipation. No melena or hematochezia.  GENITOURINARY: Avalos  SKIN: No itching, burning, rashes, or lesions   MUSCULOSKELETAL: No joint pain or swelling; No muscle, back, or extremity pain  PSYCHIATRIC: depression        Vital Signs Last 24 Hrs  T(C): 36.4 (21 Jan 2018 05:16), Max: 36.7 (20 Jan 2018 13:11)  T(F): 97.5 (21 Jan 2018 05:16), Max: 98 (20 Jan 2018 13:11)  HR: 72 (21 Jan 2018 05:16) (68 - 76)  BP: 140/56 (21 Jan 2018 05:16) (125/48 - 140/56)  BP(mean): --  RR: 16 (21 Jan 2018 05:16) (16 - 18)  SpO2: 90% (21 Jan 2018 05:16) (90% - 98%)    PHYSICAL EXAM:  general       HEENT wnl  CHEST/LUNG: rhonchi coarse anteriorly, BS at bases   HEART: Regular rate and rhythm; No murmurs, rubs, or gallops  ABDOMEN: Soft, Nontender, Nondistended; Bowel sounds present, Avalos cloudy urine + blood  EXTREMITIES:  2+ Peripheral Pulses, No clubbing, cyanosis, or edema  LYMPH: No lymphadenopathy noted  SKIN: No rashes or lesions    LABS:                        11.7   17.5  )-----------( 325      ( 21 Jan 2018 06:37 )             34.1     01-21    141  |  100  |  29<H>  ----------------------------<  93  3.6   |  35<H>  |  1.10    Ca    8.0<L>      21 Jan 2018 06:37  Vanco tr 21>          CAPILLARY BLOOD GLUCOSE                    RADIOLOGY & ADDITIONAL TESTS:    Imaging Personally Reviewed:  [y ] YES  [ ] NO    Consultant(s) Notes Reviewed:  [y ] YES  [ ] NO    Care Discussed with Consultants/Other Providers [ ] YES  [ ] NO    PROBLEMS:  PNEUMONIA, SEPSIS  DIFFICULTY BREATHING  Pneumonia  Delirium due to another medical condition, acute, mixed level of activity  Resolved Influenza  Urinary retention      Care discussed with family,         [  ]   yes  [  ]  No    imp:    stable[ ]    unstable[  ]     improving [ v  ]       unchanged  [  ]                Plans:  < Vancomycin; Start comyst f/u CBC CXR BMP

## 2018-01-22 LAB
ANION GAP SERPL CALC-SCNC: 9 MMOL/L — SIGNIFICANT CHANGE UP (ref 5–17)
BUN SERPL-MCNC: 32 MG/DL — HIGH (ref 7–23)
CALCIUM SERPL-MCNC: 8.2 MG/DL — LOW (ref 8.5–10.1)
CHLORIDE SERPL-SCNC: 101 MMOL/L — SIGNIFICANT CHANGE UP (ref 96–108)
CO2 SERPL-SCNC: 33 MMOL/L — HIGH (ref 22–31)
CREAT SERPL-MCNC: 1.1 MG/DL — SIGNIFICANT CHANGE UP (ref 0.5–1.3)
GLUCOSE SERPL-MCNC: 94 MG/DL — SIGNIFICANT CHANGE UP (ref 70–99)
HCT VFR BLD CALC: 34.3 % — LOW (ref 39–50)
HGB BLD-MCNC: 11.8 G/DL — LOW (ref 13–17)
MCHC RBC-ENTMCNC: 32 PG — SIGNIFICANT CHANGE UP (ref 27–34)
MCHC RBC-ENTMCNC: 34.4 GM/DL — SIGNIFICANT CHANGE UP (ref 32–36)
MCV RBC AUTO: 92.9 FL — SIGNIFICANT CHANGE UP (ref 80–100)
PLATELET # BLD AUTO: 310 K/UL — SIGNIFICANT CHANGE UP (ref 150–400)
POTASSIUM SERPL-MCNC: 3.5 MMOL/L — SIGNIFICANT CHANGE UP (ref 3.5–5.3)
POTASSIUM SERPL-SCNC: 3.5 MMOL/L — SIGNIFICANT CHANGE UP (ref 3.5–5.3)
RBC # BLD: 3.69 M/UL — LOW (ref 4.2–5.8)
RBC # FLD: 12.3 % — SIGNIFICANT CHANGE UP (ref 11–15)
SODIUM SERPL-SCNC: 143 MMOL/L — SIGNIFICANT CHANGE UP (ref 135–145)
WBC # BLD: 18.9 K/UL — HIGH (ref 3.8–10.5)
WBC # FLD AUTO: 18.9 K/UL — HIGH (ref 3.8–10.5)

## 2018-01-22 PROCEDURE — 71045 X-RAY EXAM CHEST 1 VIEW: CPT | Mod: 26

## 2018-01-22 PROCEDURE — 99232 SBSQ HOSP IP/OBS MODERATE 35: CPT

## 2018-01-22 RX ORDER — HALOPERIDOL DECANOATE 100 MG/ML
1 INJECTION INTRAMUSCULAR ONCE
Qty: 0 | Refills: 0 | Status: COMPLETED | OUTPATIENT
Start: 2018-01-22 | End: 2018-01-22

## 2018-01-22 RX ORDER — LANOLIN ALCOHOL/MO/W.PET/CERES
2 CREAM (GRAM) TOPICAL
Qty: 0 | Refills: 0 | Status: DISCONTINUED | OUTPATIENT
Start: 2018-01-22 | End: 2018-01-22

## 2018-01-22 RX ORDER — LANOLIN ALCOHOL/MO/W.PET/CERES
3 CREAM (GRAM) TOPICAL
Qty: 0 | Refills: 0 | Status: DISCONTINUED | OUTPATIENT
Start: 2018-01-22 | End: 2018-01-22

## 2018-01-22 RX ORDER — LANOLIN ALCOHOL/MO/W.PET/CERES
1.5 CREAM (GRAM) TOPICAL
Qty: 0 | Refills: 0 | Status: DISCONTINUED | OUTPATIENT
Start: 2018-01-22 | End: 2018-01-23

## 2018-01-22 RX ADMIN — Medication 200 MILLIGRAM(S): at 18:42

## 2018-01-22 RX ADMIN — HALOPERIDOL DECANOATE 1 MILLIGRAM(S): 100 INJECTION INTRAMUSCULAR at 09:25

## 2018-01-22 RX ADMIN — Medication 3 MILLILITER(S): at 18:26

## 2018-01-22 RX ADMIN — Medication 3 MILLILITER(S): at 06:18

## 2018-01-22 RX ADMIN — Medication 3 MILLILITER(S): at 12:03

## 2018-01-22 RX ADMIN — Medication 200 MILLIGRAM(S): at 21:18

## 2018-01-22 RX ADMIN — Medication 20 MILLIEQUIVALENT(S): at 11:38

## 2018-01-22 RX ADMIN — ATORVASTATIN CALCIUM 20 MILLIGRAM(S): 80 TABLET, FILM COATED ORAL at 21:18

## 2018-01-22 RX ADMIN — Medication 200 MILLIGRAM(S): at 09:26

## 2018-01-22 RX ADMIN — Medication 1 TABLET(S): at 06:04

## 2018-01-22 RX ADMIN — Medication 40 MILLIGRAM(S): at 06:04

## 2018-01-22 RX ADMIN — Medication 3 MILLILITER(S): at 18:25

## 2018-01-22 RX ADMIN — Medication 3 MILLILITER(S): at 00:43

## 2018-01-22 RX ADMIN — Medication 200 MILLIGRAM(S): at 13:17

## 2018-01-22 RX ADMIN — Medication 0.5 MILLIGRAM(S): at 06:57

## 2018-01-22 RX ADMIN — MEROPENEM 100 MILLIGRAM(S): 1 INJECTION INTRAVENOUS at 21:19

## 2018-01-22 RX ADMIN — MEROPENEM 100 MILLIGRAM(S): 1 INJECTION INTRAVENOUS at 06:02

## 2018-01-22 RX ADMIN — Medication 0.5 MILLIGRAM(S): at 18:42

## 2018-01-22 RX ADMIN — PANTOPRAZOLE SODIUM 40 MILLIGRAM(S): 20 TABLET, DELAYED RELEASE ORAL at 18:43

## 2018-01-22 RX ADMIN — PANTOPRAZOLE SODIUM 40 MILLIGRAM(S): 20 TABLET, DELAYED RELEASE ORAL at 06:02

## 2018-01-22 RX ADMIN — Medication 1 TABLET(S): at 18:43

## 2018-01-22 RX ADMIN — MEROPENEM 100 MILLIGRAM(S): 1 INJECTION INTRAVENOUS at 13:19

## 2018-01-22 RX ADMIN — Medication 3 MILLILITER(S): at 12:00

## 2018-01-22 RX ADMIN — Medication 200 MILLIGRAM(S): at 06:04

## 2018-01-22 RX ADMIN — Medication 81 MILLIGRAM(S): at 11:38

## 2018-01-22 RX ADMIN — ATENOLOL 50 MILLIGRAM(S): 25 TABLET ORAL at 06:04

## 2018-01-22 RX ADMIN — Medication 100 MILLIGRAM(S): at 21:19

## 2018-01-22 RX ADMIN — Medication 100 MILLIGRAM(S): at 10:20

## 2018-01-22 RX ADMIN — Medication 1.5 MILLIGRAM(S): at 21:18

## 2018-01-22 NOTE — PROGRESS NOTE ADULT - SUBJECTIVE AND OBJECTIVE BOX
AGITATED  RESTRAINED  SAT >90 %  COARSE BREATH SOUNDS  SOFT HEART SOUNDS  NO EDEMA    CONTINUING TREATMENT FOR PNEUMONIA  STABLE CARDIOVASCULAR STATUS; CONTINUING WITH PRESENT MANAGEMENT.

## 2018-01-22 NOTE — CHART NOTE - NSCHARTNOTEFT_GEN_A_CORE
Assessment:     89 y.o. male; recent discharge 12/22 c PNA; sent to rehab; now admitted from [facility] SOB; + flu.     Factors impacting intake: [ ] none [ ] nausea  [ ] vomiting [ ] diarrhea [ ] constipation  [ ]chewing problems [ ] swallowing issues  [X ] other:  AMS; lack of appetite    Diet Presciption: Diet, Dysphagia 2 Mechanical Soft-Nectar Consistency Fluid:   Supplement Feeding Modality:  Oral  Ensure Clear Cans or Servings Per Day:  1  Ensure Pudding Cans or Servings Per Day:  1       Frequency:  Three Times a day (01-18-18 @ 14:55)    Intake: 0-75% as documented by nursing; pt refuses to eat at times; sometimes doesn't feel well enough to eat    Current Weight: 70.4 kg (1/22); previous wt 74.8 kg (1/11)  % Weight Change: loss of 6% x 11 days (- 4.4 kgs)    Pertinent Medications: MEDICATIONS  (STANDING):  acetylcysteine 10% Inhalation 3 milliLiter(s) Inhalation every 6 hours  ALBUTerol/ipratropium for Nebulization 3 milliLiter(s) Nebulizer every 6 hours  aspirin enteric coated 81 milliGRAM(s) Oral daily  ATENolol  Tablet 50 milliGRAM(s) Oral daily  atorvastatin 20 milliGRAM(s) Oral at bedtime  buDESOnide   0.5 milliGRAM(s) Respule 0.5 milliGRAM(s) Inhalation every 12 hours  furosemide    Tablet 40 milliGRAM(s) Oral daily  guaiFENesin    Syrup 200 milliGRAM(s) Oral every 4 hours  lactobacillus acidophilus 1 Tablet(s) Oral every 12 hours  melatonin 1.5 milliGRAM(s) Oral <User Schedule>  meropenem IVPB      meropenem IVPB 1000 milliGRAM(s) IV Intermittent every 8 hours  pantoprazole  Injectable 40 milliGRAM(s) IV Push every 12 hours  potassium chloride    Tablet ER 20 milliEquivalent(s) Oral daily  vancomycin  IVPB 500 milliGRAM(s) IV Intermittent every 12 hours    MEDICATIONS  (PRN):  acetaminophen   Tablet. 650 milliGRAM(s) Oral every 6 hours PRN Moderate Pain (4 - 6)  acetaminophen  Suppository 650 milliGRAM(s) Rectal every 6 hours PRN For Temp greater than 38 C (100.4 F)    Pertinent Labs: 01-22 Na143 mmol/L Glu 94 mg/dL K+ 3.5 mmol/L Cr  1.10 mg/dL BUN 32 mg/dL<H> Phos n/a   Alb n/a   PAB n/a        CAPILLARY BLOOD GLUCOSE      Skin: suspected DTI on sacrum; no edema noted  + for fecal & urinary incontinence    Estimated Needs:   [X ] no change since previous assessment  [ ] recalculated:     Previous Nutrition Diagnosis:   [ ] Inadequate Energy Intake [ ]Inadequate Oral Intake [ ] Excessive Energy Intake   [ ] Underweight [ ] Increased Nutrient Needs [ ] Overweight/Obesity   [X ] Severe Malnutrition in context of acute illness      Nutrition Diagnosis is [x ] ongoing  [ ] resolved [ ] not applicable     New Nutrition Diagnosis: [ X] not applicable       Interventions:   Recommend  [ X] Change Diet To: Dysphagia 2 - mechanical soft/nectar thick liquids   [ X] Nutrition Supplement:  Ensure Pudding x 3/day (provides 510 kcal, 12 g protein); d/c Ensure Clear x 1/day as not appropriate c altered consistency liquids   [ ] Nutrition Support  [ ] Other:     Monitoring and Evaluation:   [X ] PO intake [ x ] Tolerance to diet prescription [ x ] weights [ x ] labs[ x ] follow up per protocol  [ ] other:

## 2018-01-22 NOTE — PROGRESS NOTE ADULT - SUBJECTIVE AND OBJECTIVE BOX
TMAX - 99.1    On day # 11 Meropenem / # 11 Vanco    Vital Signs Last 24 Hrs  T(C): 36.8 (22 Jan 2018 17:32), Max: 37.3 (22 Jan 2018 11:50)  T(F): 98.2 (22 Jan 2018 17:32), Max: 99.1 (22 Jan 2018 11:50)  HR: 68 (22 Jan 2018 17:32) (68 - 93)  BP: 122/43 (22 Jan 2018 17:32) (108/72 - 139/71)  BP(mean): --  RR: 17 (22 Jan 2018 17:32) (17 - 18)  SpO2: 95% (22 Jan 2018 17:32) (93% - 97%)  Supplemental O2:  on NC O2     Awakens, no c/o cp, claims some decreased SOB and decreased cough now.      PHYSICAL EXAM  General:  awakens, sitting upright in bed now, but appears fatigued and somewhat confused to his surroundings, with NC O2 in place, with audible upper airway congestion noted  HEENT:  conj pink, sclerae anicteric, PERRLA, no oral lesions noted  Neck:  supple, no nodes noted  Heart:  RR  Lungs:  bilateral upper airway moist rhonchi  Abdomen:  soft, BS +, nontender to palpation  No CVA or Spinal tenderness elicited  Extremities:  trace edema LE's                     no calf tenderness  Skin:  warm, dry, no rash noted      I&O's Summary    21 Jan 2018 07:01  -  22 Jan 2018 07:00  --------------------------------------------------------  IN: 0 mL / OUT: 975 mL / NET: -975 mL    22 Jan 2018 07:01  -  22 Jan 2018 18:19  --------------------------------------------------------  IN: 300 mL / OUT: 0 mL / NET: 300 mL      LABS:  CBC Full  -  ( 22 Jan 2018 06:30 )  WBC Count : 18.9 K/uL  Hemoglobin : 11.8 g/dL  Hematocrit : 34.3 %  Platelet Count - Automated : 310 K/uL  Mean Cell Volume : 92.9 fl  Mean Cell Hemoglobin : 32.0 pg  Mean Cell Hemoglobin Concentration : 34.4 gm/dL  Auto Neutrophil # : x  Auto Lymphocyte # : x  Auto Monocyte # : x  Auto Eosinophil # : x  Auto Basophil # : x  Auto Neutrophil % : x  Auto Lymphocyte % : x  Auto Monocyte % : x  Auto Eosinophil % : x  Auto Basophil % : x    01-22    143  |  101  |  32<H>  ----------------------------<  94  3.5   |  33<H>  |  1.10    Ca    8.2<L>      22 Jan 2018 06:30        Sedimentation Rate, Erythrocyte: 41 mm/hr (01-20 @ 06:03)        Vancomycin Level, Trough: 21.6 ug/mL (01-21 @ 06:32)          Radiology:   < from: Xray Chest 1 View AP -PORTABLE-Routine (01.22.18 @ 09:03) >  INTERPRETATION:  cough    A frontal chest film demonstrates multifocal airspace disease grossly   unchanged or mildly worsened as compared to priorfilm 1/19/2018.    The heart size and vascular markings are within normal limits for   technique.      There are differences in rotation. .     The osseous structures appear intact intact.     IMPRESSION:  Multifocal airspace disease again noted either unchanged or slightly   worsened.        Impression:  Temps remain low-grade ( 99.1 max ) on present ab rx with Meropenem + Vanco but with increased WBC's again.  Noted Vanco trough slightly elevated and dose adjusted.      Suggestions:  Will continue present ab rx and follow-up temps and labs.  Follow-up CXR.  If WBC's  remain elevated will re-Cx Blood and consider further evaluation with CT Chest, Abdomen, and Pelvis.

## 2018-01-22 NOTE — PROGRESS NOTE ADULT - SUBJECTIVE AND OBJECTIVE BOX
pt is agitated screaming  Rhonchi anteriorly  < BS at bases  S1S2 reg  Abd soft  Avalos - gross hematuria  WBC >18    A/P PNA  s/p influenza  ASHD  Delirium  Haldol 1mg IM stat  Psychiatry consult  repeat CXR

## 2018-01-23 LAB
ANION GAP SERPL CALC-SCNC: 4 MMOL/L — LOW (ref 5–17)
BASOPHILS # BLD AUTO: 0.1 K/UL — SIGNIFICANT CHANGE UP (ref 0–0.2)
BASOPHILS NFR BLD AUTO: 0.8 % — SIGNIFICANT CHANGE UP (ref 0–2)
BUN SERPL-MCNC: 32 MG/DL — HIGH (ref 7–23)
CALCIUM SERPL-MCNC: 8.2 MG/DL — LOW (ref 8.5–10.1)
CHLORIDE SERPL-SCNC: 103 MMOL/L — SIGNIFICANT CHANGE UP (ref 96–108)
CO2 SERPL-SCNC: 37 MMOL/L — HIGH (ref 22–31)
CREAT SERPL-MCNC: 1.13 MG/DL — SIGNIFICANT CHANGE UP (ref 0.5–1.3)
EOSINOPHIL # BLD AUTO: 0.8 K/UL — HIGH (ref 0–0.5)
EOSINOPHIL NFR BLD AUTO: 4.8 % — SIGNIFICANT CHANGE UP (ref 0–6)
ERYTHROCYTE [SEDIMENTATION RATE] IN BLOOD: 58 MM/HR — HIGH (ref 0–20)
GLUCOSE SERPL-MCNC: 107 MG/DL — HIGH (ref 70–99)
HCT VFR BLD CALC: 31.9 % — LOW (ref 39–50)
HGB BLD-MCNC: 10.5 G/DL — LOW (ref 13–17)
LYMPHOCYTES # BLD AUTO: 14.5 % — SIGNIFICANT CHANGE UP (ref 13–44)
LYMPHOCYTES # BLD AUTO: 2.5 K/UL — SIGNIFICANT CHANGE UP (ref 1–3.3)
MCHC RBC-ENTMCNC: 30.6 PG — SIGNIFICANT CHANGE UP (ref 27–34)
MCHC RBC-ENTMCNC: 33 GM/DL — SIGNIFICANT CHANGE UP (ref 32–36)
MCV RBC AUTO: 92.6 FL — SIGNIFICANT CHANGE UP (ref 80–100)
MONOCYTES # BLD AUTO: 1.9 K/UL — HIGH (ref 0–0.9)
MONOCYTES NFR BLD AUTO: 10.8 % — SIGNIFICANT CHANGE UP (ref 2–14)
NEUTROPHILS # BLD AUTO: 11.8 K/UL — HIGH (ref 1.8–7.4)
NEUTROPHILS NFR BLD AUTO: 69 % — SIGNIFICANT CHANGE UP (ref 43–77)
PLATELET # BLD AUTO: 289 K/UL — SIGNIFICANT CHANGE UP (ref 150–400)
POTASSIUM SERPL-MCNC: 3.5 MMOL/L — SIGNIFICANT CHANGE UP (ref 3.5–5.3)
POTASSIUM SERPL-SCNC: 3.5 MMOL/L — SIGNIFICANT CHANGE UP (ref 3.5–5.3)
RBC # BLD: 3.44 M/UL — LOW (ref 4.2–5.8)
RBC # FLD: 12.3 % — SIGNIFICANT CHANGE UP (ref 11–15)
SODIUM SERPL-SCNC: 144 MMOL/L — SIGNIFICANT CHANGE UP (ref 135–145)
WBC # BLD: 17.1 K/UL — HIGH (ref 3.8–10.5)
WBC # FLD AUTO: 17.1 K/UL — HIGH (ref 3.8–10.5)

## 2018-01-23 PROCEDURE — 99232 SBSQ HOSP IP/OBS MODERATE 35: CPT

## 2018-01-23 PROCEDURE — 71250 CT THORAX DX C-: CPT | Mod: 26

## 2018-01-23 RX ADMIN — Medication 100 MILLIGRAM(S): at 11:50

## 2018-01-23 RX ADMIN — Medication 3 MILLILITER(S): at 06:05

## 2018-01-23 RX ADMIN — Medication 3 MILLILITER(S): at 16:50

## 2018-01-23 RX ADMIN — Medication 1 TABLET(S): at 05:20

## 2018-01-23 RX ADMIN — PANTOPRAZOLE SODIUM 40 MILLIGRAM(S): 20 TABLET, DELAYED RELEASE ORAL at 05:23

## 2018-01-23 RX ADMIN — MEROPENEM 100 MILLIGRAM(S): 1 INJECTION INTRAVENOUS at 16:27

## 2018-01-23 RX ADMIN — MEROPENEM 100 MILLIGRAM(S): 1 INJECTION INTRAVENOUS at 05:17

## 2018-01-23 RX ADMIN — Medication 3 MILLILITER(S): at 00:48

## 2018-01-23 RX ADMIN — Medication 0.5 MILLIGRAM(S): at 06:05

## 2018-01-23 RX ADMIN — Medication 40 MILLIGRAM(S): at 05:19

## 2018-01-23 RX ADMIN — Medication 200 MILLIGRAM(S): at 22:31

## 2018-01-23 RX ADMIN — MEROPENEM 100 MILLIGRAM(S): 1 INJECTION INTRAVENOUS at 22:27

## 2018-01-23 RX ADMIN — ATORVASTATIN CALCIUM 20 MILLIGRAM(S): 80 TABLET, FILM COATED ORAL at 22:28

## 2018-01-23 RX ADMIN — Medication 3 MILLILITER(S): at 11:04

## 2018-01-23 RX ADMIN — ATENOLOL 50 MILLIGRAM(S): 25 TABLET ORAL at 05:19

## 2018-01-23 RX ADMIN — Medication 81 MILLIGRAM(S): at 11:50

## 2018-01-23 RX ADMIN — Medication 3 MILLILITER(S): at 11:03

## 2018-01-23 RX ADMIN — Medication 0.5 MILLIGRAM(S): at 16:50

## 2018-01-23 RX ADMIN — Medication 200 MILLIGRAM(S): at 05:20

## 2018-01-23 RX ADMIN — Medication 100 MILLIGRAM(S): at 22:28

## 2018-01-23 RX ADMIN — PANTOPRAZOLE SODIUM 40 MILLIGRAM(S): 20 TABLET, DELAYED RELEASE ORAL at 18:11

## 2018-01-23 NOTE — PROGRESS NOTE BEHAVIORAL HEALTH - NSBHCONSULTMEDSEVERE_PSY_A_CORE FT
haldol 2mg q 6hrs prn im. Hold for qtc>=500
haldol 1mg q 6hrs prn im- only for severe agitation not responding to verbal stimuli

## 2018-01-23 NOTE — PROGRESS NOTE ADULT - SUBJECTIVE AND OBJECTIVE BOX
Patient is a 89y old  Male who presents with a chief complaint of SOB, cough (13 Jan 2018 09:33)      INTERVAL HPI/OVERNIGHT EVENTS:    MEDICATIONS  (STANDING):  acetylcysteine 10% Inhalation 3 milliLiter(s) Inhalation every 6 hours  ALBUTerol/ipratropium for Nebulization 3 milliLiter(s) Nebulizer every 6 hours  aspirin enteric coated 81 milliGRAM(s) Oral daily  ATENolol  Tablet 50 milliGRAM(s) Oral daily  atorvastatin 20 milliGRAM(s) Oral at bedtime  buDESOnide   0.5 milliGRAM(s) Respule 0.5 milliGRAM(s) Inhalation every 12 hours  furosemide    Tablet 40 milliGRAM(s) Oral daily  guaiFENesin    Syrup 200 milliGRAM(s) Oral every 4 hours  lactobacillus acidophilus 1 Tablet(s) Oral every 12 hours  melatonin 1.5 milliGRAM(s) Oral <User Schedule>  meropenem IVPB      meropenem IVPB 1000 milliGRAM(s) IV Intermittent every 8 hours  pantoprazole  Injectable 40 milliGRAM(s) IV Push every 12 hours  potassium chloride    Tablet ER 20 milliEquivalent(s) Oral daily  vancomycin  IVPB 500 milliGRAM(s) IV Intermittent every 12 hours    MEDICATIONS  (PRN):  acetaminophen   Tablet. 650 milliGRAM(s) Oral every 6 hours PRN Moderate Pain (4 - 6)  acetaminophen  Suppository 650 milliGRAM(s) Rectal every 6 hours PRN For Temp greater than 38 C (100.4 F)      Allergies    aspirin (Stomach Upset)    Intolerances        REVIEW OF SYSTEMS:        lethargic        Vital Signs Last 24 Hrs  T(C): 36.9 (23 Jan 2018 05:25), Max: 37.3 (22 Jan 2018 11:50)  T(F): 98.4 (23 Jan 2018 05:25), Max: 99.1 (22 Jan 2018 11:50)  HR: 100 (23 Jan 2018 07:04) (68 - 102)  BP: 132/55 (23 Jan 2018 05:25) (108/72 - 132/55)  BP(mean): --  RR: 17 (23 Jan 2018 05:25) (17 - 18)  SpO2: 97% (23 Jan 2018 07:04) (95% - 99%)    PHYSICAL EXAM:  general       HEENT wnl  CHEST/LUNG: coarse rhonchi  HEART: Regular rate and rhythm; No murmurs, rubs, or gallops  ABDOMEN: Soft, Nontender, Nondistended; Bowel sounds present  EXTREMITIES:  2+ Peripheral Pulses, No clubbing, cyanosis, or edema  LYMPH: No lymphadenopathy noted  SKIN: No rashes or lesions    LABS:                        10.5   17.1  )-----------( 289      ( 23 Jan 2018 07:04 )             31.9     01-23    144  |  103  |  32<H>  ----------------------------<  107<H>  3.5   |  37<H>  |  1.13    Ca    8.2<L>      23 Jan 2018 07:04          CAPILLARY BLOOD GLUCOSE                    RADIOLOGY & ADDITIONAL TESTS:    Imaging Personally Reviewed:  [y ] YES  [ ] NO    Consultant(s) Notes Reviewed:  [y ] YES  [ ] NO    Care Discussed with Consultants/Other Providers [ ] YES  [ ] NO    PROBLEMS:  PNEUMONIA, SEPSIS  DIFFICULTY BREATHING  Pneumonia  Delirium due to another medical condition, acute, mixed level of activity  > WBC  worsening CXR      Care discussed with family,         [  ]   yes  [  ]  No    imp:    stable[ ]    unstable[v ]     improving [   ]       unchanged  [  ]                Plans:  Continue present plans  [ v ] pulmonary and ID to f/u.  CT of chest

## 2018-01-23 NOTE — PROGRESS NOTE ADULT - SUBJECTIVE AND OBJECTIVE BOX
patient in CT scan  chart and vital signs reviewed   continue Antibiotics   will f/u CT chest   oxygen via Nasal Cannula   oxygen saturation ok on Nasal Cannula

## 2018-01-23 NOTE — PROGRESS NOTE ADULT - SUBJECTIVE AND OBJECTIVE BOX
TMAX - 99.1    On day # 12 Meropenem / # 12 Vanco    Vital Signs Last 24 Hrs  T(C): 35.8 (23 Jan 2018 16:25), Max: 36.9 (23 Jan 2018 05:25)  T(F): 96.4 (23 Jan 2018 16:25), Max: 98.4 (23 Jan 2018 05:25)  HR: 100 (23 Jan 2018 17:00) (13 - 102)  BP: 144/55 (23 Jan 2018 16:25) (105/46 - 144/55)  BP(mean): --  RR: 17 (23 Jan 2018 16:25) (17 - 17)  SpO2: 97% (23 Jan 2018 17:00) (91% - 97%)  Supplemental O2:  on NC O2      Arousable, but lethargic,no c/o cp or SOB at present.    PHYSICAL EXAM  General:    HEENT:    Neck:    Heart:    Lungs:    Abdomen:    Extremities:    Skin:  warm, dry, no rash noted        I&O's Summary:    22 Jan 2018 07:01  -  23 Jan 2018 07:00  --------------------------------------------------------  IN: 300 mL / OUT: 1010 mL / NET: -710 mL    23 Jan 2018 07:01  -  23 Jan 2018 18:37  --------------------------------------------------------  IN: 0 mL / OUT: 1100 mL / NET: -1100 mL      LABS:  CBC Full  -  ( 23 Jan 2018 07:04 )  WBC Count : 17.1 K/uL  Hemoglobin : 10.5 g/dL  Hematocrit : 31.9 %  Platelet Count - Automated : 289 K/uL  Mean Cell Volume : 92.6 fl  Mean Cell Hemoglobin : 30.6 pg  Mean Cell Hemoglobin Concentration : 33.0 gm/dL  Auto Neutrophil # : 11.8 K/uL  Auto Lymphocyte # : 2.5 K/uL  Auto Monocyte # : 1.9 K/uL  Auto Eosinophil # : 0.8 K/uL  Auto Basophil # : 0.1 K/uL  Auto Neutrophil % : 69.0 %  Auto Lymphocyte % : 14.5 %  Auto Monocyte % : 10.8 %  Auto Eosinophil % : 4.8 %  Auto Basophil % : 0.8 %    01-23    144  |  103  |  32<H>  ----------------------------<  107<H>  3.5   |  37<H>  |  1.13    Ca    8.2<L>      23 Jan 2018 07:04        Sedimentation Rate, Erythrocyte: 58 mm/hr (01-23 @ 07:04)    Sedimentation Rate, Erythrocyte: 41 mm/hr (01-20 @ 06:03)          Vancomycin Level, Trough: 21.6 ug/mL (01-21 @ 06:32)           Radiology:   CT CHEST -  < from: CT Chest No Cont (01.23.18 @ 12:34) >  CHEST:     LUNGS AND LARGE AIRWAYS: Mucus or debris within lower lobe branches of   the left bronchus. Emphysema. Right upper lobe peripheral opacities have   improved. Bilateral lower lobe consolidations, increased in the left   lower lobe. Bilateral tree-in-bud opacities, possibly infectious or   inflammatory.  PLEURA: Small bilateral pleural effusions.  VESSELS: Atherosclerotic changes of thoracic aorta and coronary arteries.  HEART: Heart size is normal. Trace pericardial effusion.  MEDIASTINUM AND ROBBIE: Moderate hiatal hernia with partially intrathoracic   stomach. Prominent mediastinal lymph nodes.  CHEST WALL AND LOWER NECK: Within normal limits.  VISUALIZED UPPER ABDOMEN: Cholelithiasis. Atrophic pancreas.  BONES: Shoulder and spine degenerative changes.    IMPRESSION:     Emphysema.  Small bilateral pleural effusions.  Debris/mucus within lower lobe branches of the left bronchus.  Bilateral lower lobe consolidations, increased in the left lower lobe.   Finding may represent atelectasis and postobstructive pneumonia.          Impression:        Suggestions:

## 2018-01-24 LAB
ANION GAP SERPL CALC-SCNC: 8 MMOL/L — SIGNIFICANT CHANGE UP (ref 5–17)
APPEARANCE UR: ABNORMAL
BILIRUB UR-MCNC: NEGATIVE — SIGNIFICANT CHANGE UP
BUN SERPL-MCNC: 33 MG/DL — HIGH (ref 7–23)
CALCIUM SERPL-MCNC: 8.2 MG/DL — LOW (ref 8.5–10.1)
CHLORIDE SERPL-SCNC: 102 MMOL/L — SIGNIFICANT CHANGE UP (ref 96–108)
CO2 SERPL-SCNC: 35 MMOL/L — HIGH (ref 22–31)
COLOR SPEC: ABNORMAL
CREAT SERPL-MCNC: 0.99 MG/DL — SIGNIFICANT CHANGE UP (ref 0.5–1.3)
DIFF PNL FLD: ABNORMAL
EPI CELLS # UR: SIGNIFICANT CHANGE UP
GLUCOSE SERPL-MCNC: 100 MG/DL — HIGH (ref 70–99)
GLUCOSE UR QL: NEGATIVE MG/DL — SIGNIFICANT CHANGE UP
HCT VFR BLD CALC: 34.8 % — LOW (ref 39–50)
HGB BLD-MCNC: 10.9 G/DL — LOW (ref 13–17)
KETONES UR-MCNC: ABNORMAL
LEUKOCYTE ESTERASE UR-ACNC: ABNORMAL
MCHC RBC-ENTMCNC: 29.2 PG — SIGNIFICANT CHANGE UP (ref 27–34)
MCHC RBC-ENTMCNC: 31.3 GM/DL — LOW (ref 32–36)
MCV RBC AUTO: 93.3 FL — SIGNIFICANT CHANGE UP (ref 80–100)
NITRITE UR-MCNC: NEGATIVE — SIGNIFICANT CHANGE UP
NRBC # BLD: 0 /100 WBCS — SIGNIFICANT CHANGE UP (ref 0–0)
PH UR: 7 — SIGNIFICANT CHANGE UP (ref 5–8)
PLATELET # BLD AUTO: 299 K/UL — SIGNIFICANT CHANGE UP (ref 150–400)
POTASSIUM SERPL-MCNC: 3.4 MMOL/L — LOW (ref 3.5–5.3)
POTASSIUM SERPL-SCNC: 3.4 MMOL/L — LOW (ref 3.5–5.3)
PROT UR-MCNC: 100 MG/DL
RBC # BLD: 3.73 M/UL — LOW (ref 4.2–5.8)
RBC # FLD: 13.2 % — SIGNIFICANT CHANGE UP (ref 10.3–14.5)
RBC CASTS # UR COMP ASSIST: SIGNIFICANT CHANGE UP /HPF (ref 0–4)
SODIUM SERPL-SCNC: 145 MMOL/L — SIGNIFICANT CHANGE UP (ref 135–145)
SP GR SPEC: 1.01 — SIGNIFICANT CHANGE UP (ref 1.01–1.02)
UROBILINOGEN FLD QL: NEGATIVE MG/DL — SIGNIFICANT CHANGE UP
VANCOMYCIN TROUGH SERPL-MCNC: 17.9 UG/ML — SIGNIFICANT CHANGE UP (ref 10–20)
WBC # BLD: 16.75 K/UL — HIGH (ref 3.8–10.5)
WBC # FLD AUTO: 16.75 K/UL — HIGH (ref 3.8–10.5)
WBC UR QL: ABNORMAL

## 2018-01-24 RX ADMIN — MEROPENEM 100 MILLIGRAM(S): 1 INJECTION INTRAVENOUS at 05:31

## 2018-01-24 RX ADMIN — Medication 3 MILLILITER(S): at 23:19

## 2018-01-24 RX ADMIN — Medication 3 MILLILITER(S): at 01:08

## 2018-01-24 RX ADMIN — Medication 40 MILLIGRAM(S): at 05:33

## 2018-01-24 RX ADMIN — Medication 1 TABLET(S): at 18:12

## 2018-01-24 RX ADMIN — ATENOLOL 50 MILLIGRAM(S): 25 TABLET ORAL at 05:33

## 2018-01-24 RX ADMIN — PANTOPRAZOLE SODIUM 40 MILLIGRAM(S): 20 TABLET, DELAYED RELEASE ORAL at 18:12

## 2018-01-24 RX ADMIN — Medication 81 MILLIGRAM(S): at 11:44

## 2018-01-24 RX ADMIN — Medication 200 MILLIGRAM(S): at 05:33

## 2018-01-24 RX ADMIN — Medication 3 MILLILITER(S): at 11:19

## 2018-01-24 RX ADMIN — Medication 3 MILLILITER(S): at 06:40

## 2018-01-24 RX ADMIN — Medication 100 MILLIGRAM(S): at 22:41

## 2018-01-24 RX ADMIN — MEROPENEM 100 MILLIGRAM(S): 1 INJECTION INTRAVENOUS at 22:42

## 2018-01-24 RX ADMIN — Medication 3 MILLILITER(S): at 17:46

## 2018-01-24 RX ADMIN — Medication 0.5 MILLIGRAM(S): at 06:40

## 2018-01-24 RX ADMIN — MEROPENEM 100 MILLIGRAM(S): 1 INJECTION INTRAVENOUS at 13:37

## 2018-01-24 RX ADMIN — Medication 1 TABLET(S): at 05:33

## 2018-01-24 RX ADMIN — Medication 200 MILLIGRAM(S): at 22:41

## 2018-01-24 RX ADMIN — Medication 200 MILLIGRAM(S): at 10:11

## 2018-01-24 RX ADMIN — Medication 20 MILLIEQUIVALENT(S): at 11:44

## 2018-01-24 RX ADMIN — Medication 3 MILLILITER(S): at 17:47

## 2018-01-24 RX ADMIN — ATORVASTATIN CALCIUM 20 MILLIGRAM(S): 80 TABLET, FILM COATED ORAL at 22:42

## 2018-01-24 RX ADMIN — Medication 200 MILLIGRAM(S): at 13:37

## 2018-01-24 RX ADMIN — PANTOPRAZOLE SODIUM 40 MILLIGRAM(S): 20 TABLET, DELAYED RELEASE ORAL at 05:33

## 2018-01-24 RX ADMIN — Medication 3 MILLILITER(S): at 01:07

## 2018-01-24 RX ADMIN — Medication 0.5 MILLIGRAM(S): at 17:47

## 2018-01-24 RX ADMIN — Medication 200 MILLIGRAM(S): at 18:12

## 2018-01-24 RX ADMIN — Medication 3 MILLILITER(S): at 11:20

## 2018-01-24 RX ADMIN — Medication 100 MILLIGRAM(S): at 10:52

## 2018-01-24 NOTE — PROGRESS NOTE ADULT - SUBJECTIVE AND OBJECTIVE BOX
Vital Signs Last 24 Hrs  T(C): 36.2 (2018 17:20), Max: 37.1 (2018 19:59)  T(F): 97.2 (2018 17:20), Max: 98.8 (2018 19:59)  HR: 83 (2018 18:28) (75 - 101)  BP: 115/70 (2018 17:20) (115/70 - 151/73)  BP(mean): --  RR: 17 (2018 17:20) (17 - 18)  SpO2: 96% (2018 18:28) (93% - 98%)  Physical Exam  patient lying in bed in no respiratory distress on Nasal Cannula   HEENT - EOMI, PERRLA ,neck supple , No JVD  lungs - decreased BS, no wheezing , ronchi or rales   COR- [id number] , no murmer  Abd- soft, BS+, no tenderness, no guarding   ext- ecc neg, good pulses  Neuro - Alert , oriented X3   Skin- No rashes   MEDICATIONS  (STANDING):  acetylcysteine 10% Inhalation 3 milliLiter(s) Inhalation every 6 hours  ALBUTerol/ipratropium for Nebulization 3 milliLiter(s) Nebulizer every 6 hours  aspirin enteric coated 81 milliGRAM(s) Oral daily  ATENolol  Tablet 50 milliGRAM(s) Oral daily  atorvastatin 20 milliGRAM(s) Oral at bedtime  buDESOnide   0.5 milliGRAM(s) Respule 0.5 milliGRAM(s) Inhalation every 12 hours  furosemide    Tablet 40 milliGRAM(s) Oral daily  guaiFENesin    Syrup 200 milliGRAM(s) Oral every 4 hours  lactobacillus acidophilus 1 Tablet(s) Oral every 12 hours  meropenem IVPB      meropenem IVPB 1000 milliGRAM(s) IV Intermittent every 8 hours  pantoprazole  Injectable 40 milliGRAM(s) IV Push every 12 hours  potassium chloride    Tablet ER 20 milliEquivalent(s) Oral daily  vancomycin  IVPB 500 milliGRAM(s) IV Intermittent every 12 hours    MEDICATIONS  (PRN):  acetaminophen   Tablet. 650 milliGRAM(s) Oral every 6 hours PRN Moderate Pain (4 - 6)  acetaminophen  Suppository 650 milliGRAM(s) Rectal every 6 hours PRN For Temp greater than 38 C (100.4 F)                        10.9   16.75 )-----------( 299      ( 2018 06:39 )             34.8   -    145  |  102  |  33<H>  ----------------------------<  100<H>  3.4<L>   |  35<H>  |  0.99    Ca    8.2<L>      2018 06:39      Urinalysis Basic - ( 2018 08:25 )    Color: Red / Appearance: Slightly Turbid / S.010 / pH: x  Gluc: x / Ketone: Trace  / Bili: Negative / Urobili: Negative mg/dL   Blood: x / Protein: 100 mg/dL / Nitrite: Negative   Leuk Esterase: Small / RBC: packed /HPF / WBC 6-10   Sq Epi: x / Non Sq Epi: Few / Bacteria: x      CT chest - bilateral pleural effusions , bilateral LL consolidation/ atelectasis. mucus plugging LLL

## 2018-01-24 NOTE — PROGRESS NOTE ADULT - ASSESSMENT
Pneumonia   pleural effusion   atelectasis    Recommendations  patient clinically improved   afebrile   still with increase WBC   continue bronchodilators   chest PT   continue diuretics   check procalcitonin   Antibiotics as per ID ? Discontinue if procalcitonin improved

## 2018-01-24 NOTE — PROGRESS NOTE ADULT - SUBJECTIVE AND OBJECTIVE BOX
CT SHOWS ATELECTASIS WITH POST OBSTRUCTIVE PNEUMONIA; MUCOUS PLUGGING; SMALL EFFUSIONS  CV STATUS OVERALL STABLE; ON ORAL DIURETICS  CONTINUING PRESENT CARE  ON BROAD SPECTRUM ANTIBIOTICS

## 2018-01-24 NOTE — PROGRESS NOTE ADULT - SUBJECTIVE AND OBJECTIVE BOX
Patient is a 89y old  Male who presents with a chief complaint of SOB, cough (2018 09:33)      INTERVAL HPI/OVERNIGHT EVENTS:    MEDICATIONS  (STANDING):  acetylcysteine 10% Inhalation 3 milliLiter(s) Inhalation every 6 hours  ALBUTerol/ipratropium for Nebulization 3 milliLiter(s) Nebulizer every 6 hours  aspirin enteric coated 81 milliGRAM(s) Oral daily  ATENolol  Tablet 50 milliGRAM(s) Oral daily  atorvastatin 20 milliGRAM(s) Oral at bedtime  buDESOnide   0.5 milliGRAM(s) Respule 0.5 milliGRAM(s) Inhalation every 12 hours  furosemide    Tablet 40 milliGRAM(s) Oral daily  guaiFENesin    Syrup 200 milliGRAM(s) Oral every 4 hours  lactobacillus acidophilus 1 Tablet(s) Oral every 12 hours  meropenem IVPB      meropenem IVPB 1000 milliGRAM(s) IV Intermittent every 8 hours  pantoprazole  Injectable 40 milliGRAM(s) IV Push every 12 hours  potassium chloride    Tablet ER 20 milliEquivalent(s) Oral daily  vancomycin  IVPB 500 milliGRAM(s) IV Intermittent every 12 hours    MEDICATIONS  (PRN):  acetaminophen   Tablet. 650 milliGRAM(s) Oral every 6 hours PRN Moderate Pain (4 - 6)  acetaminophen  Suppository 650 milliGRAM(s) Rectal every 6 hours PRN For Temp greater than 38 C (100.4 F)      Allergies    aspirin (Stomach Upset)    Intolerances        REVIEW OF SYSTEMS:  pt is lethargic, comfortable      Vital Signs Last 24 Hrs  T(C): 36.5 (2018 06:56), Max: 37.1 (2018 19:59)  T(F): 97.7 (2018 06:56), Max: 98.8 (2018 19:59)  HR: 86 (2018 06:56) (13 - 102)  BP: 133/72 (2018 06:56) (105/46 - 144/55)  BP(mean): --  RR: 18 (2018 06:56) (17 - 18)  SpO2: 97% (2018 06:56) (91% - 97%)    PHYSICAL EXAM:  general       HEENT wnl  CHEST/LUNG: much improved, < rhonchi, no wheezing, < BS at bases  HEART: Regular rate and rhythm; No murmurs, rubs, or gallops  ABDOMEN: Soft, Nontender, Nondistended; Bowel sounds present  EXTREMITIES:  2+ Peripheral Pulses, No clubbing, cyanosis, or edema  LYMPH: No lymphadenopathy noted  SKIN: No rashes or lesions    LABS:                        10.9   16.75 )-----------( 299      ( 2018 06:39 )             34.8     01-    145  |  102  |  33<H>  ----------------------------<  100<H>  3.4<L>   |  35<H>  |  0.99    Ca    8.2<L>      2018 06:39        Urinalysis Basic - ( 2018 08:25 )    Color: Red / Appearance: Slightly Turbid / S.010 / pH: x  Gluc: x / Ketone: Trace  / Bili: Negative / Urobili: Negative mg/dL   Blood: x / Protein: 100 mg/dL / Nitrite: Negative   Leuk Esterase: Small / RBC: packed /HPF / WBC 6-10   Sq Epi: x / Non Sq Epi: Few / Bacteria: x      CT noted PNA effusions                    RADIOLOGY & ADDITIONAL TESTS:    Imaging Personally Reviewed:  [y ] YES  [ ] NO    Consultant(s) Notes Reviewed:  [y ] YES  [ ] NO    Care Discussed with Consultants/Other Providers [ ] YES  [ ] NO    PROBLEMS:  PNEUMONIA, SEPSIS  DIFFICULTY BREATHING  Pneumonia  Delirium due to another medical condition, acute, mixed level of activity  Dysphagia      Care discussed with family,         [  ]   yes  [  ]  No    imp:    stable[v ]    unstable[  ]     improving [   ]       unchanged  [  ]                Plans:  Continue antibiotics  PT daily

## 2018-01-25 LAB — PROCALCITONIN SERPL-MCNC: 0.44 NG/ML — HIGH (ref 0–0.04)

## 2018-01-25 RX ADMIN — Medication 1 TABLET(S): at 05:48

## 2018-01-25 RX ADMIN — Medication 200 MILLIGRAM(S): at 13:51

## 2018-01-25 RX ADMIN — Medication 0.5 MILLIGRAM(S): at 05:28

## 2018-01-25 RX ADMIN — MEROPENEM 100 MILLIGRAM(S): 1 INJECTION INTRAVENOUS at 22:20

## 2018-01-25 RX ADMIN — Medication 3 MILLILITER(S): at 11:45

## 2018-01-25 RX ADMIN — Medication 200 MILLIGRAM(S): at 17:47

## 2018-01-25 RX ADMIN — Medication 0.5 MILLIGRAM(S): at 17:45

## 2018-01-25 RX ADMIN — Medication 20 MILLIEQUIVALENT(S): at 11:33

## 2018-01-25 RX ADMIN — Medication 3 MILLILITER(S): at 11:46

## 2018-01-25 RX ADMIN — PANTOPRAZOLE SODIUM 40 MILLIGRAM(S): 20 TABLET, DELAYED RELEASE ORAL at 05:48

## 2018-01-25 RX ADMIN — PANTOPRAZOLE SODIUM 40 MILLIGRAM(S): 20 TABLET, DELAYED RELEASE ORAL at 17:47

## 2018-01-25 RX ADMIN — ATENOLOL 50 MILLIGRAM(S): 25 TABLET ORAL at 05:48

## 2018-01-25 RX ADMIN — Medication 40 MILLIGRAM(S): at 05:48

## 2018-01-25 RX ADMIN — ATORVASTATIN CALCIUM 20 MILLIGRAM(S): 80 TABLET, FILM COATED ORAL at 22:20

## 2018-01-25 RX ADMIN — Medication 3 MILLILITER(S): at 17:44

## 2018-01-25 RX ADMIN — Medication 100 MILLIGRAM(S): at 22:20

## 2018-01-25 RX ADMIN — MEROPENEM 100 MILLIGRAM(S): 1 INJECTION INTRAVENOUS at 13:51

## 2018-01-25 RX ADMIN — MEROPENEM 100 MILLIGRAM(S): 1 INJECTION INTRAVENOUS at 05:48

## 2018-01-25 RX ADMIN — Medication 81 MILLIGRAM(S): at 11:33

## 2018-01-25 RX ADMIN — Medication 1 TABLET(S): at 17:47

## 2018-01-25 RX ADMIN — Medication 3 MILLILITER(S): at 05:28

## 2018-01-25 RX ADMIN — Medication 100 MILLIGRAM(S): at 11:32

## 2018-01-25 RX ADMIN — Medication 3 MILLILITER(S): at 05:27

## 2018-01-25 RX ADMIN — Medication 200 MILLIGRAM(S): at 22:20

## 2018-01-25 RX ADMIN — Medication 200 MILLIGRAM(S): at 11:31

## 2018-01-25 RX ADMIN — Medication 200 MILLIGRAM(S): at 05:48

## 2018-01-25 NOTE — PROGRESS NOTE ADULT - SUBJECTIVE AND OBJECTIVE BOX
NON LABORED BREATHING  CONTROLED APICAL RATE  COARSE BREATH SOUNDS  SOFT S1  ABDOMEN SOFT, NONTENDER, NO DISTENSION  NO EDEMA    PNEUMONIA  STABLE CARDIOVASCULAR STATUS; CONTINUING WITH PRESENT MANAGEMENT.

## 2018-01-25 NOTE — PROGRESS NOTE ADULT - SUBJECTIVE AND OBJECTIVE BOX
Afebrile  Lethargic arousable  < appetite  Periodically agitated  Clinically PNA resolving - better air movement, no rhonchi  Abd soft  Avalos mild hematuria  AB as per ID  Prognosis guarded

## 2018-01-25 NOTE — PROGRESS NOTE ADULT - SUBJECTIVE AND OBJECTIVE BOX
TMAX - 99.2    On day # 14 Meropenem / # 14 Vanco     Vital Signs Last 24 Hrs  T(C): 37.3 (2018 11:09), Max: 37.3 (2018 11:09)  T(F): 99.2 (2018 11:09), Max: 99.2 (2018 11:09)  HR: 89 (2018 17:54) (70 - 100)  BP: 103/62 (2018 17:07) (103/62 - 159/82)  BP(mean): --  RR: 23 (2018 17:07) (17 - 23)  SpO2: 98% (2018 17:54) (96% - 100%)  Supplemental O2: on NC O2 now    Arousable, no c/o pain or SOB at present.       PHYSICAL EXAM  General: Lethargic but arousable now, lying in bed with NC O2 in place, in NAD   HEENT:    Neck:    Heart:    Lungs:    Abdomen:    Extremities:    Skin:  warm, dry, no rash noted  Avalos in place and draining dark brown, cloudy, bloody urine now        I&O's Summary :    2018 07:01  -  2018 07:00  --------------------------------------------------------  IN: 590 mL / OUT: 1000 mL / NET: -410 mL    2018 07:01  -  2018 18:23  --------------------------------------------------------  IN: 360 mL / OUT: 0 mL / NET: 360 mL      LABS:  CBC Full  -  ( 2018 06:39 )  WBC Count : 16.75 K/uL  Hemoglobin : 10.9 g/dL  Hematocrit : 34.8 %  Platelet Count - Automated : 299 K/uL  Mean Cell Volume : 93.3 fl  Mean Cell Hemoglobin : 29.2 pg  Mean Cell Hemoglobin Concentration : 31.3 gm/dL  Auto Neutrophil # : x  Auto Lymphocyte # : x  Auto Monocyte # : x  Auto Eosinophil # : x  Auto Basophil # : x  Auto Neutrophil % : x  Auto Lymphocyte % : x  Auto Monocyte % : x  Auto Eosinophil % : x  Auto Basophil % : x        145  |  102  |  33<H>  ----------------------------<  100<H>  3.4<L>   |  35<H>  |  0.99    Ca    8.2<L>      2018 06:39        Urinalysis Basic - ( 2018 08:25 )  Color: Red / Appearance: Slightly Turbid / S.010 / pH: x  Gluc: x / Ketone: Trace  / Bili: Negative / Urobili: Negative mg/dL   Blood: x / Protein: 100 mg/dL / Nitrite: Negative   Leuk Esterase: Small / RBC: packed /HPF / WBC 6-10   Sq Epi: x / Non Sq Epi: Few / Bacteria: x            Sedimentation Rate, Erythrocyte: 58 mm/hr ( @ 07:04)      Vancomycin Level, Trough: 17.9 ug/mL ( @ 21:55)          Radiology:        Impression:    Suggestions:

## 2018-01-26 LAB
ANION GAP SERPL CALC-SCNC: 3 MMOL/L — LOW (ref 5–17)
BUN SERPL-MCNC: 37 MG/DL — HIGH (ref 7–23)
CALCIUM SERPL-MCNC: 8.4 MG/DL — LOW (ref 8.5–10.1)
CHLORIDE SERPL-SCNC: 104 MMOL/L — SIGNIFICANT CHANGE UP (ref 96–108)
CO2 SERPL-SCNC: 40 MMOL/L — HIGH (ref 22–31)
CREAT SERPL-MCNC: 1.15 MG/DL — SIGNIFICANT CHANGE UP (ref 0.5–1.3)
GLUCOSE SERPL-MCNC: 99 MG/DL — SIGNIFICANT CHANGE UP (ref 70–99)
HCT VFR BLD CALC: 33.5 % — LOW (ref 39–50)
HGB BLD-MCNC: 10.5 G/DL — LOW (ref 13–17)
MCHC RBC-ENTMCNC: 29.6 PG — SIGNIFICANT CHANGE UP (ref 27–34)
MCHC RBC-ENTMCNC: 31.3 GM/DL — LOW (ref 32–36)
MCV RBC AUTO: 94.4 FL — SIGNIFICANT CHANGE UP (ref 80–100)
NRBC # BLD: 0 /100 WBCS — SIGNIFICANT CHANGE UP (ref 0–0)
PLATELET # BLD AUTO: 327 K/UL — SIGNIFICANT CHANGE UP (ref 150–400)
POTASSIUM SERPL-MCNC: 3.2 MMOL/L — LOW (ref 3.5–5.3)
POTASSIUM SERPL-SCNC: 3.2 MMOL/L — LOW (ref 3.5–5.3)
RBC # BLD: 3.55 M/UL — LOW (ref 4.2–5.8)
RBC # FLD: 13.1 % — SIGNIFICANT CHANGE UP (ref 10.3–14.5)
SODIUM SERPL-SCNC: 147 MMOL/L — HIGH (ref 135–145)
WBC # BLD: 18.34 K/UL — HIGH (ref 3.8–10.5)
WBC # FLD AUTO: 18.34 K/UL — HIGH (ref 3.8–10.5)

## 2018-01-26 PROCEDURE — 99232 SBSQ HOSP IP/OBS MODERATE 35: CPT

## 2018-01-26 PROCEDURE — 71045 X-RAY EXAM CHEST 1 VIEW: CPT | Mod: 26

## 2018-01-26 PROCEDURE — 70450 CT HEAD/BRAIN W/O DYE: CPT | Mod: 26

## 2018-01-26 RX ORDER — POTASSIUM CHLORIDE 20 MEQ
10 PACKET (EA) ORAL
Qty: 0 | Refills: 0 | Status: COMPLETED | OUTPATIENT
Start: 2018-01-26 | End: 2018-01-26

## 2018-01-26 RX ADMIN — Medication 0.5 MILLIGRAM(S): at 18:16

## 2018-01-26 RX ADMIN — Medication 3 MILLILITER(S): at 05:36

## 2018-01-26 RX ADMIN — Medication 3 MILLILITER(S): at 23:54

## 2018-01-26 RX ADMIN — ATORVASTATIN CALCIUM 20 MILLIGRAM(S): 80 TABLET, FILM COATED ORAL at 21:29

## 2018-01-26 RX ADMIN — Medication 1 TABLET(S): at 05:09

## 2018-01-26 RX ADMIN — Medication 3 MILLILITER(S): at 11:09

## 2018-01-26 RX ADMIN — Medication 3 MILLILITER(S): at 23:53

## 2018-01-26 RX ADMIN — PANTOPRAZOLE SODIUM 40 MILLIGRAM(S): 20 TABLET, DELAYED RELEASE ORAL at 17:19

## 2018-01-26 RX ADMIN — Medication 100 MILLIGRAM(S): at 21:29

## 2018-01-26 RX ADMIN — MEROPENEM 100 MILLIGRAM(S): 1 INJECTION INTRAVENOUS at 05:09

## 2018-01-26 RX ADMIN — Medication 3 MILLILITER(S): at 18:15

## 2018-01-26 RX ADMIN — Medication 100 MILLIGRAM(S): at 14:35

## 2018-01-26 RX ADMIN — MEROPENEM 100 MILLIGRAM(S): 1 INJECTION INTRAVENOUS at 21:30

## 2018-01-26 RX ADMIN — ATENOLOL 50 MILLIGRAM(S): 25 TABLET ORAL at 05:09

## 2018-01-26 RX ADMIN — Medication 0.5 MILLIGRAM(S): at 05:36

## 2018-01-26 RX ADMIN — Medication 3 MILLILITER(S): at 00:21

## 2018-01-26 RX ADMIN — Medication 40 MILLIGRAM(S): at 05:09

## 2018-01-26 RX ADMIN — Medication 100 MILLIEQUIVALENT(S): at 14:45

## 2018-01-26 RX ADMIN — Medication 100 MILLIEQUIVALENT(S): at 12:42

## 2018-01-26 RX ADMIN — Medication 100 MILLIEQUIVALENT(S): at 13:45

## 2018-01-26 RX ADMIN — PANTOPRAZOLE SODIUM 40 MILLIGRAM(S): 20 TABLET, DELAYED RELEASE ORAL at 05:10

## 2018-01-26 RX ADMIN — Medication 200 MILLIGRAM(S): at 05:09

## 2018-01-26 RX ADMIN — MEROPENEM 100 MILLIGRAM(S): 1 INJECTION INTRAVENOUS at 14:39

## 2018-01-26 NOTE — CHART NOTE - NSCHARTNOTEFT_GEN_A_CORE
Assessment:     89 y.o. male c recent discharge (12/22) c PNA; sent to rehab; now admitted from [facility] c SOB; + flu  MD unable to arouse pt this morning despite vigorous stimulation; CT of head ordered to r/o possible CVA  Per psych eval (1/23) lethargic, not interacting c MD; recommends palliative care consult    Factors impacting intake: [ ] none [ ] nausea  [ ] vomiting [ ] diarrhea [ ] constipation  [ ]chewing problems [ ] swallowing issues  [X ] other: AMS    Diet Presciption: Diet, Dysphagia 2 Mechanical Soft-Nectar Consistency Fluid:   Supplement Feeding Modality:  Oral  Ensure Pudding Cans or Servings Per Day:  1       Frequency:  Three Times a day (01-22-18 @ 15:52)    Intake: total feed; 0-75%; pt refuses to eat at times; sometimes doesn't feel well enough to eat;  now unresponsive    Current Weight: 69.2 kg (1/26); previous wt 70.4 kg (1/22)  % Weight Change: 2% loss x 4 days    Pertinent Medications: MEDICATIONS  (STANDING):  acetylcysteine 10% Inhalation 3 milliLiter(s) Inhalation every 6 hours  ALBUTerol/ipratropium for Nebulization 3 milliLiter(s) Nebulizer every 6 hours  aspirin enteric coated 81 milliGRAM(s) Oral daily  ATENolol  Tablet 50 milliGRAM(s) Oral daily  atorvastatin 20 milliGRAM(s) Oral at bedtime  buDESOnide   0.5 milliGRAM(s) Respule 0.5 milliGRAM(s) Inhalation every 12 hours  furosemide    Tablet 40 milliGRAM(s) Oral daily  guaiFENesin    Syrup 200 milliGRAM(s) Oral every 4 hours  lactobacillus acidophilus 1 Tablet(s) Oral every 12 hours  meropenem IVPB      meropenem IVPB 1000 milliGRAM(s) IV Intermittent every 8 hours  pantoprazole  Injectable 40 milliGRAM(s) IV Push every 12 hours  potassium chloride    Tablet ER 20 milliEquivalent(s) Oral daily  potassium chloride  10 mEq/100 mL IVPB 10 milliEquivalent(s) IV Intermittent every 1 hour  vancomycin  IVPB 500 milliGRAM(s) IV Intermittent every 12 hours    MEDICATIONS  (PRN):  acetaminophen   Tablet. 650 milliGRAM(s) Oral every 6 hours PRN Moderate Pain (4 - 6)  acetaminophen  Suppository 650 milliGRAM(s) Rectal every 6 hours PRN For Temp greater than 38 C (100.4 F)    Pertinent Labs: 01-26 Na147 mmol/L<H> Glu 99 mg/dL K+ 3.2 mmol/L<L> Cr  1.15 mg/dL BUN 37 mg/dL<H> Phos n/a   Alb n/a   PAB n/a        CAPILLARY BLOOD GLUCOSE    Skin: suspected DTI on sacrum; no edema noted  + for  fecal & urinary incontinence    Estimated Needs:   [X ] no change since previous assessment  [ ] recalculated:     Previous Nutrition Diagnosis:   [ ] Inadequate Energy Intake [ ]Inadequate Oral Intake [ ] Excessive Energy Intake   [ ] Underweight [ ] Increased Nutrient Needs [ ] Overweight/Obesity   [ ] Altered GI Function [ ] Unintended Weight Loss [ ] Food & Nutrition Related Knowledge Deficit  [X ]Severe Malnutrition in context of acute illness    Nutrition focused physical exam conducted:    Subcutaneous fat loss: [ ] Orbital fat pads region, [ ]Buccal fat region, [ ]Triceps region,  [ ]Ribs region.    Muscle wasting: [ ]Temples region, [ ]Clavicle region, [ ]Shoulder region, [ ]Scapula region, [ ]Interosseous region,  [ ]thigh region, [ ]Calf region    Nutrition Diagnosis is [x ] ongoing  [ ] resolved [ ] not applicable     New Nutrition Diagnosis: [X ] not applicable       Interventions: continue current diet rx as noted  Recommend  [ ] Change Diet To:  [ ] Nutrition Supplement  [ ] Nutrition Support  [ ] Other:     Monitoring and Evaluation:   [X ] PO intake [ x ] Tolerance to diet prescription [ x ] weights [ x ] labs[ x ] follow up per protocol  [ ] other: Assessment:     89 y.o. male c recent discharge (12/22) c PNA; sent to rehab; now admitted from [facility] c SOB; + flu  MD unable to arouse pt this morning despite vigorous stimulation; CT of head ordered to r/o possible CVA  Per psych eval (1/23) lethargic, not interacting c MD; recommends palliative care consult    Factors impacting intake: [ ] none [ ] nausea  [ ] vomiting [ ] diarrhea [ ] constipation  [ ]chewing problems [ ] swallowing issues  [X ] other: AMS    Diet Presciption: Diet, Dysphagia 2 Mechanical Soft-Nectar Consistency Fluid:   Supplement Feeding Modality:  Oral  Ensure Pudding Cans or Servings Per Day:  1       Frequency:  Three Times a day (01-22-18 @ 15:52)    Intake: total feed; 0-75%; pt refuses to eat at times; sometimes doesn't feel well enough to eat;  now unresponsive    Current Weight: 69.2 kg (1/26); previous wt 70.4 kg (1/22)  % Weight Change: 2% loss x 4 days    Pertinent Medications: MEDICATIONS  (STANDING):  acetylcysteine 10% Inhalation 3 milliLiter(s) Inhalation every 6 hours  ALBUTerol/ipratropium for Nebulization 3 milliLiter(s) Nebulizer every 6 hours  aspirin enteric coated 81 milliGRAM(s) Oral daily  ATENolol  Tablet 50 milliGRAM(s) Oral daily  atorvastatin 20 milliGRAM(s) Oral at bedtime  buDESOnide   0.5 milliGRAM(s) Respule 0.5 milliGRAM(s) Inhalation every 12 hours  furosemide    Tablet 40 milliGRAM(s) Oral daily  guaiFENesin    Syrup 200 milliGRAM(s) Oral every 4 hours  lactobacillus acidophilus 1 Tablet(s) Oral every 12 hours  meropenem IVPB      meropenem IVPB 1000 milliGRAM(s) IV Intermittent every 8 hours  pantoprazole  Injectable 40 milliGRAM(s) IV Push every 12 hours  potassium chloride    Tablet ER 20 milliEquivalent(s) Oral daily  potassium chloride  10 mEq/100 mL IVPB 10 milliEquivalent(s) IV Intermittent every 1 hour  vancomycin  IVPB 500 milliGRAM(s) IV Intermittent every 12 hours    MEDICATIONS  (PRN):  acetaminophen   Tablet. 650 milliGRAM(s) Oral every 6 hours PRN Moderate Pain (4 - 6)  acetaminophen  Suppository 650 milliGRAM(s) Rectal every 6 hours PRN For Temp greater than 38 C (100.4 F)    Pertinent Labs: 01-26 Na147 mmol/L<H> Glu 99 mg/dL K+ 3.2 mmol/L<L> Cr  1.15 mg/dL BUN 37 mg/dL<H> Phos n/a   Alb n/a   PAB n/a        CAPILLARY BLOOD GLUCOSE    Skin: suspected DTI on sacrum; no edema noted  + for  fecal & urinary incontinence    Estimated Needs:   [X ] no change since previous assessment  [ ] recalculated:     Previous Nutrition Diagnosis:   [ ] Inadequate Energy Intake [ ]Inadequate Oral Intake [ ] Excessive Energy Intake   [ ] Underweight [ ] Increased Nutrient Needs [ ] Overweight/Obesity   [ ] Altered GI Function [ ] Unintended Weight Loss [ ] Food & Nutrition Related Knowledge Deficit  [X ]Severe Malnutrition in context of acute illness    [severe ] Orbital fat pads region, [unable - no teeth in] Buccal fat region, [unable ]Triceps region,  [moderate ]Ribs region.    Muscle wasting: [ severe]Temples region, [severe ]Clavicle region, [ severe]Shoulder region, [unable ]Scapula region, [unable-mittens on hands ]Interosseous region,  [moderate ]thigh region,   [unable-compression device on ]Calf region    Nutrition Diagnosis is [x ] ongoing  [ ] resolved [ ] not applicable     New Nutrition Diagnosis: [X ] not applicable       Interventions: continue current diet rx as noted  Recommend  [ ] Change Diet To:  [ ] Nutrition Supplement  [ ] Nutrition Support  [ ] Other:     Monitoring and Evaluation:   [X ] PO intake [ x ] Tolerance to diet prescription [ x ] weights [ x ] labs[ x ] follow up per protocol  [ ] other: Assessment:     89 y.o. male c recent discharge (12/22) c PNA; sent to rehab; now admitted from [facility] c SOB; + flu  MD unable to arouse pt this morning despite vigorous stimulation; CT of head ordered to r/o possible CVA  Per psych eval (1/23) lethargic, not interacting c MD; recommends palliative care consult    Factors impacting intake: [ ] none [ ] nausea  [ ] vomiting [ ] diarrhea [ ] constipation  [ ]chewing problems [ ] swallowing issues  [X ] other: AMS    Diet Presciption: Diet, Dysphagia 2 Mechanical Soft-Nectar Consistency Fluid:   Supplement Feeding Modality:  Oral  Ensure Pudding Cans or Servings Per Day:  1       Frequency:  Three Times a day (01-22-18 @ 15:52)    Intake: total feed; 0-75%; pt refuses to eat at times; sometimes doesn't feel well enough to eat;  now unresponsive    Current Weight: 69.2 kg (1/26); previous wt 70.4 kg (1/22); prior 74.8 kg (1/11)  % Weight Change: 2% loss x 4 days; total loss 5.6 kg (8%) x 2 weeks; no edema noted at any time    Pertinent Medications: MEDICATIONS  (STANDING):  acetylcysteine 10% Inhalation 3 milliLiter(s) Inhalation every 6 hours  ALBUTerol/ipratropium for Nebulization 3 milliLiter(s) Nebulizer every 6 hours  aspirin enteric coated 81 milliGRAM(s) Oral daily  ATENolol  Tablet 50 milliGRAM(s) Oral daily  atorvastatin 20 milliGRAM(s) Oral at bedtime  buDESOnide   0.5 milliGRAM(s) Respule 0.5 milliGRAM(s) Inhalation every 12 hours  furosemide    Tablet 40 milliGRAM(s) Oral daily  guaiFENesin    Syrup 200 milliGRAM(s) Oral every 4 hours  lactobacillus acidophilus 1 Tablet(s) Oral every 12 hours  meropenem IVPB      meropenem IVPB 1000 milliGRAM(s) IV Intermittent every 8 hours  pantoprazole  Injectable 40 milliGRAM(s) IV Push every 12 hours  potassium chloride    Tablet ER 20 milliEquivalent(s) Oral daily  potassium chloride  10 mEq/100 mL IVPB 10 milliEquivalent(s) IV Intermittent every 1 hour  vancomycin  IVPB 500 milliGRAM(s) IV Intermittent every 12 hours    MEDICATIONS  (PRN):  acetaminophen   Tablet. 650 milliGRAM(s) Oral every 6 hours PRN Moderate Pain (4 - 6)  acetaminophen  Suppository 650 milliGRAM(s) Rectal every 6 hours PRN For Temp greater than 38 C (100.4 F)    Pertinent Labs: 01-26 Na147 mmol/L<H> Glu 99 mg/dL K+ 3.2 mmol/L<L> Cr  1.15 mg/dL BUN 37 mg/dL<H> Phos n/a   Alb n/a   PAB n/a        CAPILLARY BLOOD GLUCOSE    Skin: suspected DTI on sacrum; no edema noted  + for  fecal & urinary incontinence    Estimated Needs:   [X ] no change since previous assessment  [ ] recalculated:     Previous Nutrition Diagnosis:   [ ] Inadequate Energy Intake [ ]Inadequate Oral Intake [ ] Excessive Energy Intake   [ ] Underweight [ ] Increased Nutrient Needs [ ] Overweight/Obesity   [ ] Altered GI Function [ ] Unintended Weight Loss [ ] Food & Nutrition Related Knowledge Deficit  [X ]Severe Malnutrition in context of acute illness    [severe ] Orbital fat pads region, [unable - no teeth in] Buccal fat region, [unable ]Triceps region,  [moderate ]Ribs region.    Muscle wasting: [ severe]Temples region, [severe ]Clavicle region, [ severe]Shoulder region, [unable ]Scapula region, [unable-mittens on hands ]Interosseous region,  [moderate ]thigh region,   [unable-compression device on ]Calf region    Nutrition Diagnosis is [x ] ongoing  [ ] resolved [ ] not applicable     New Nutrition Diagnosis: [X ] not applicable       Interventions: continue current diet rx as noted  Recommend  [ ] Change Diet To:  [ ] Nutrition Supplement  [ ] Nutrition Support  [ ] Other:     Monitoring and Evaluation:   [X ] PO intake [ x ] Tolerance to diet prescription [ x ] weights [ x ] labs[ x ] follow up per protocol  [ ] other:

## 2018-01-26 NOTE — PROGRESS NOTE ADULT - ASSESSMENT
Pneumonia   pleural effusion    recommendations  clinically improved   procalcitonin decreasing   continue diuretics   Antibiotics as per ID   bronchodilators   repeat CXR if patient spikes or in 2-3 weeks   Pulmonary signig off. Please reconsult PRN Pneumonia   pleural effusion    recommendations  clinically improved   procalcitonin decreasing   continue diuretics   Antibiotics as per ID   bronchodilators   continue chest PT   aspiration precautions   oxygen via Nasal Cannula   repeat CXR if patient spikes or in 2-3 weeks   Pulmonary signig off. Please reconsult PRN

## 2018-01-26 NOTE — PROGRESS NOTE BEHAVIORAL HEALTH - GAIT / STATION
[facility]  [address]  [facility] [address]  Phone: [phone]    May 29, 2017        Marleen Mcfadden  [address]   [facility] [address]          To whom it may concern:    RE: Marleen Mcfadden    Patient was seen and treated today at our clinic and having ongoing left knee pain. She will see her orthopedist in 3 weeks for further recommendations. She would like to continue working which is fine, however, may want to favor less painful activities when possible and a few additional breaks to rest and ice the area.     Avoid climbing and deep knee bending or squatting. Follow-up with orthopedics as directed for further recommendations.       Sincerely,      Javier Gomez MD  
Other

## 2018-01-26 NOTE — PROGRESS NOTE BEHAVIORAL HEALTH - RISK ASSESSMENT
older male, confusion, delirium, agitation
older male, confusion, disorientation, aggression, but no mental health history, and current agitation is in the context of delirium, receives supervision on the floor.
has a few risks: older male, confusion, delirium, agitation

## 2018-01-26 NOTE — PROGRESS NOTE ADULT - SUBJECTIVE AND OBJECTIVE BOX
patient without complaints  Vital Signs Last 24 Hrs  T(C): 36.7 (26 Jan 2018 14:25), Max: 36.7 (26 Jan 2018 14:25)  T(F): 98 (26 Jan 2018 14:25), Max: 98 (26 Jan 2018 14:25)  HR: 74 (26 Jan 2018 14:25) (70 - 92)  BP: 112/52 (26 Jan 2018 14:25) (103/62 - 133/53)  BP(mean): --  RR: 19 (26 Jan 2018 14:25) (18 - 23)  SpO2: 98% (26 Jan 2018 14:25) (98% - 100%)  Physical Exam  patient lying in bed in no respiratory distress  HEENT - EOMI, PERRLA ,neck supple , No JVD  lungs - decreased BS, no wheezing , ronchi or rales   COR- [id number] , no murmer  Abd- soft, BS+, no tenderness, no guarding   ext- ecc neg, good pulses  Neuro - Alert , oriented X3   Skin- No rashes   MEDICATIONS  (STANDING):  acetylcysteine 10% Inhalation 3 milliLiter(s) Inhalation every 6 hours  ALBUTerol/ipratropium for Nebulization 3 milliLiter(s) Nebulizer every 6 hours  aspirin enteric coated 81 milliGRAM(s) Oral daily  ATENolol  Tablet 50 milliGRAM(s) Oral daily  atorvastatin 20 milliGRAM(s) Oral at bedtime  buDESOnide   0.5 milliGRAM(s) Respule 0.5 milliGRAM(s) Inhalation every 12 hours  furosemide    Tablet 40 milliGRAM(s) Oral daily  guaiFENesin    Syrup 200 milliGRAM(s) Oral every 4 hours  lactobacillus acidophilus 1 Tablet(s) Oral every 12 hours  meropenem IVPB      meropenem IVPB 1000 milliGRAM(s) IV Intermittent every 8 hours  pantoprazole  Injectable 40 milliGRAM(s) IV Push every 12 hours  potassium chloride    Tablet ER 20 milliEquivalent(s) Oral daily  potassium chloride  10 mEq/100 mL IVPB 10 milliEquivalent(s) IV Intermittent every 1 hour  vancomycin  IVPB 500 milliGRAM(s) IV Intermittent every 12 hours    MEDICATIONS  (PRN):  acetaminophen   Tablet. 650 milliGRAM(s) Oral every 6 hours PRN Moderate Pain (4 - 6)  acetaminophen  Suppository 650 milliGRAM(s) Rectal every 6 hours PRN For Temp greater than 38 C (100.4 F)                        10.5   18.34 )-----------( 327      ( 26 Jan 2018 07:57 )             33.5   01-26    147<H>  |  104  |  37<H>  ----------------------------<  99  3.2<L>   |  40<H>  |  1.15    Ca    8.4<L>      26 Jan 2018 07:57

## 2018-01-26 NOTE — PROGRESS NOTE BEHAVIORAL HEALTH - ADDITIONAL DETAILS / COMMENTS
NOTED TO BE LETHARGIC, DOES NOT INTERACT WITH MD AS BEFORE
PATIENT IS MINIMALLY - NON RESPONSIVE EVEN TO PAIN.
unable to fully complete mse due to confusion

## 2018-01-26 NOTE — PROGRESS NOTE BEHAVIORAL HEALTH - NSBHCONSULTMEDS_PSY_A_CORE FT
hospice eval
Son does not consent to standing psychotropics at this point considering new aggression.   Melatonin 1.5mg q hs po- for delirium sleep wake reversal
would d/c melatonin.  Please consider a Palliative medicine consult

## 2018-01-26 NOTE — PROGRESS NOTE BEHAVIORAL HEALTH - SUMMARY
Patient is noted to be minimally to non responsive for the most part of this day. No distress, but appears to be declining. His trajectory for the last one week has been with mentation oscillating between minimally responsive to periods of agitation.   Would require a hospice eval.
No mental health issues at baseline, now with flu+ pna+- worsening. Emergence of agitation/aggression, increased confusion since midnight, requiring restraints and prn haldol. Calm now. Called the patient's son, and discussed the above events. Discussed delirium, and the aggression ongoing. Son consents to PRN Haldol for aggression.
No mental health issues at baseline, now with flu+ pna+- worsening. Emergence of agitation/aggression, increased confusion since yesterday midnight, requiring restraints and prn haldol. Even though he was agitated last night, required soft restraints, he did not appear to have got any PRN meds.   Increasingly lethargic this afternoon, and does not respond to md as before. Discussed with rn.

## 2018-01-26 NOTE — PROGRESS NOTE BEHAVIORAL HEALTH - NSBHCONSULTFOLLOWAFTERCARE_PSY_A_CORE FT
as per medical team
as per medical team. Will follow up tomorrow to assess medication needs.
as per medical team

## 2018-01-26 NOTE — PROGRESS NOTE BEHAVIORAL HEALTH - NSBHFUPINTERVALHXFT_PSY_A_CORE
Met with the patient this afternoon. Lethargic, responds to pain, but otherwise does not interact with MD as before. Calm, and distress noted. Asked aide to check VS and BS. Discussed with DANIEL Coreas.
Met with the patient this morning. Calm, ongoing respiratory treatment. Confused, disoriented.  Called the patient's son, and discussed the above events. Discussed delirium, and the aggression ongoing. Son consents to PRN Haldol for aggression.
Noted to be minimally responsive today. Does not engage in any conversation. No distress, breathing effortlessly with some coarse coughs intermittently. Spoke to Ms Altamirano regarding patient's declining status. Discussed to consider hospice at this point.

## 2018-01-26 NOTE — PROGRESS NOTE BEHAVIORAL HEALTH - PRIMARY DX
Delirium due to another medical condition, acute, mixed level of activity

## 2018-01-26 NOTE — PROGRESS NOTE BEHAVIORAL HEALTH - NSBHFUPINTERVALCCFT_PSY_A_CORE
Increasingly agitated since midnight. PNA worsened on CXR. Restraints needed. Haldol 1mg IM given early this morning.
Was agitated overnight. As per MAR did not receive any PRN. Required soft restraints.
Has soft mittens on. Generally calm, with periods of agitation where he tries to remove tubes.

## 2018-01-26 NOTE — GOALS OF CARE CONVERSATION - PERSONAL ADVANCE DIRECTIVE - CONVERSATION DETAILS
Met pt at his bedside lying in bed, not responding to verbal commands or reacting when pinching toe.  Pt has mittens on at present, not eating at this time & has O2NC on.  Pt has Advance Directives on chart MOLST/DNR/DNI/No Peg.  Called & spoke with son Sky Ingram [phone] today.  He informed me that he is aware of pts present condition, he told me he was here last night & when they went to change him last night he did not react at that time.  Son had told me he was thinking about [facility], but I informed him that I wasn't sure if they would accept him there if he doesn't begin to wake up & participate.  I educated son about Hospice & if he would permit a Hospice evaluation & then think about a SNF that has Hospice.  The son Sky returned good understanding of above & gave permission for a Hospice evaluation to be done.  The son informed me, "I know my dad can't live forever, & I am realistic".  Referral to Hospice given to Keri at N,  Viri made aware & will be sending [employer] to Hospice & also let PMD Dr See made aware & agreeable.

## 2018-01-27 LAB
ANION GAP SERPL CALC-SCNC: 6 MMOL/L — SIGNIFICANT CHANGE UP (ref 5–17)
BUN SERPL-MCNC: 33 MG/DL — HIGH (ref 7–23)
CALCIUM SERPL-MCNC: 8.3 MG/DL — LOW (ref 8.5–10.1)
CHLORIDE SERPL-SCNC: 105 MMOL/L — SIGNIFICANT CHANGE UP (ref 96–108)
CO2 SERPL-SCNC: 36 MMOL/L — HIGH (ref 22–31)
CREAT SERPL-MCNC: 0.99 MG/DL — SIGNIFICANT CHANGE UP (ref 0.5–1.3)
GLUCOSE SERPL-MCNC: 89 MG/DL — SIGNIFICANT CHANGE UP (ref 70–99)
HCT VFR BLD CALC: 36.4 % — LOW (ref 39–50)
HGB BLD-MCNC: 11.3 G/DL — LOW (ref 13–17)
MCHC RBC-ENTMCNC: 29.6 PG — SIGNIFICANT CHANGE UP (ref 27–34)
MCHC RBC-ENTMCNC: 31 GM/DL — LOW (ref 32–36)
MCV RBC AUTO: 95.3 FL — SIGNIFICANT CHANGE UP (ref 80–100)
NRBC # BLD: 0 /100 WBCS — SIGNIFICANT CHANGE UP (ref 0–0)
PLATELET # BLD AUTO: 349 K/UL — SIGNIFICANT CHANGE UP (ref 150–400)
POTASSIUM SERPL-MCNC: 3.4 MMOL/L — LOW (ref 3.5–5.3)
POTASSIUM SERPL-SCNC: 3.4 MMOL/L — LOW (ref 3.5–5.3)
RBC # BLD: 3.82 M/UL — LOW (ref 4.2–5.8)
RBC # FLD: 13.2 % — SIGNIFICANT CHANGE UP (ref 10.3–14.5)
SODIUM SERPL-SCNC: 147 MMOL/L — HIGH (ref 135–145)
WBC # BLD: 16.91 K/UL — HIGH (ref 3.8–10.5)
WBC # FLD AUTO: 16.91 K/UL — HIGH (ref 3.8–10.5)

## 2018-01-27 RX ADMIN — Medication 81 MILLIGRAM(S): at 11:12

## 2018-01-27 RX ADMIN — Medication 1 TABLET(S): at 05:41

## 2018-01-27 RX ADMIN — Medication 40 MILLIGRAM(S): at 05:41

## 2018-01-27 RX ADMIN — Medication 3 MILLILITER(S): at 05:39

## 2018-01-27 RX ADMIN — Medication 100 MILLIGRAM(S): at 21:58

## 2018-01-27 RX ADMIN — Medication 200 MILLIGRAM(S): at 10:42

## 2018-01-27 RX ADMIN — Medication 20 MILLIEQUIVALENT(S): at 11:12

## 2018-01-27 RX ADMIN — Medication 0.5 MILLIGRAM(S): at 05:40

## 2018-01-27 RX ADMIN — PANTOPRAZOLE SODIUM 40 MILLIGRAM(S): 20 TABLET, DELAYED RELEASE ORAL at 17:11

## 2018-01-27 RX ADMIN — MEROPENEM 100 MILLIGRAM(S): 1 INJECTION INTRAVENOUS at 13:31

## 2018-01-27 RX ADMIN — Medication 200 MILLIGRAM(S): at 13:31

## 2018-01-27 RX ADMIN — Medication 3 MILLILITER(S): at 11:08

## 2018-01-27 RX ADMIN — Medication 200 MILLIGRAM(S): at 17:11

## 2018-01-27 RX ADMIN — Medication 1 TABLET(S): at 17:12

## 2018-01-27 RX ADMIN — MEROPENEM 100 MILLIGRAM(S): 1 INJECTION INTRAVENOUS at 21:34

## 2018-01-27 RX ADMIN — Medication 3 MILLILITER(S): at 17:16

## 2018-01-27 RX ADMIN — ATORVASTATIN CALCIUM 20 MILLIGRAM(S): 80 TABLET, FILM COATED ORAL at 21:37

## 2018-01-27 RX ADMIN — ATENOLOL 50 MILLIGRAM(S): 25 TABLET ORAL at 05:41

## 2018-01-27 RX ADMIN — Medication 100 MILLIGRAM(S): at 10:54

## 2018-01-27 RX ADMIN — Medication 200 MILLIGRAM(S): at 21:37

## 2018-01-27 RX ADMIN — Medication 0.5 MILLIGRAM(S): at 17:16

## 2018-01-27 RX ADMIN — MEROPENEM 100 MILLIGRAM(S): 1 INJECTION INTRAVENOUS at 05:39

## 2018-01-27 RX ADMIN — PANTOPRAZOLE SODIUM 40 MILLIGRAM(S): 20 TABLET, DELAYED RELEASE ORAL at 05:42

## 2018-01-27 RX ADMIN — Medication 200 MILLIGRAM(S): at 05:39

## 2018-01-27 NOTE — PROGRESS NOTE ADULT - SUBJECTIVE AND OBJECTIVE BOX
much improved  AAO X 2  No CP no SOB  Lungs clear  S1S2 reg  Abd soft  Extr no c/e  WBC <  Procalcitonin <  CT head neg CVA  OOB to chair  PT daily  d/c to rehab p AB change to PO

## 2018-01-27 NOTE — CHART NOTE - NSCHARTNOTEFT_GEN_A_CORE
Medicine Hospitalist PA    Called by RN to remove staples on patient, per Dr. See. 8 Staples removed from top left scalp on patient. No bleeding, discharge, erythema, or edema. Bacitracin ointment applied. Patient tolerated procedure well.

## 2018-01-27 NOTE — PROGRESS NOTE ADULT - SUBJECTIVE AND OBJECTIVE BOX
HPI:  89 y/o white male with hx Paroxysmal A Fib, HTN, Increased Cholesterol, Gout, s/p Rt Cataract Surgery, admitted today via the ER from [facility] with SOB initially requiring NRB O2 and found to be febrile to 101.7 with wheezing and rhonchi on physical exam.  W/u with Rapid Influenza testing was done and now reported Positive for Influenza A and 2 Blood Cx's and Urine Cx were obtained and pending.  CXR was done and reported now with a RLL Infiltrate along with trace Pleural effusion.  Patient is originally from his own home and was admitted to [facility] from 12/18 to 12/22 during which time he was rx'ed for Multifocal Rt PNA with Zosyn and Vanco and then d/c'ed to [facility] on po Augmentin.  Patient was again seen in the ER on 1/6 after he apparently tripped on a telephone wire while at [facility] and sustained a laceration to the Lt Scalp which required sutures and patient was then d/c'ed  back to [facility].  Patient now c/o cough with only intermittent sputum production since yesterday along with increased SOB and fatigue.  No c/o cp, no abdominal pain, no N/V but c/o soft stools which he cannot always control.  No c/o dysuria or hesitancy, and claims his appetite is fair.   Patient was begun empirically on Zosyn and Vanco 2 days prior to ER but got > SOB (13 Jan 2018 09:33)  PAST MEDICAL & SURGICAL HISTORY:  Paroxysmal atrial fibrillation  Gout  Pneumonia  Elevated cholesterol  HTN (hypertension)  History of cataract surgery, right  HPI:        SYMPTOMS---	                   DIDILITY    OTHER REVIEW OF SYSTEMS:  UNABLE TO OBTAIN  due to: SEDATION, CONFUSION    Baseline ADLs (prior to admission):  Independent [ ] moderately [ ] fully   Dependent   [ ] moderately [ ]fully    	                 T(C): 37.1 (01-27-18 @ 11:44), Max: 37.1 (01-27-18 @ 11:44)  T(F): 98.8 (01-27-18 @ 11:44), Max: 98.8 (01-27-18 @ 11:44)  HR: 69 (01-27-18 @ 11:44) (69 - 89)  BP: 121/50 (01-27-18 @ 11:44) (112/52 - 158/68)  RR: 16 (01-27-18 @ 11:44) (16 - 19)  SpO2: 98% (01-27-18 @ 11:44) (94% - 100%)  Wt(kg): --      GENERAL:    EYES : non icteric :               Other:     HEENT:      	atraumatic, normocephalic, PEERLA, sclera anicteric, dry oral mucosa   NECK:          Trach:        Vent:  CVS:          Tachypnic:               Bradycardic:              Normal:		   RESP:        tachypnic:                Dyspenic :                  Comfortable:	  GI:          NGT/PEG 	  :      ortiz      	  MUSC:       	Normal	Weakness	  Edema	   NEURO:     	No focal deficit	  Focal  PSYCH:           Alert	Oriented	  Lethargic     SKIN:         	Normal	  Other  LYMPH:      	Normal	  Other  	                     ALLERGIES: aspirin (Stomach Upset)    MEDICATIONS  (STANDING):  acetylcysteine 10% Inhalation 3 milliLiter(s) Inhalation every 6 hours  ALBUTerol/ipratropium for Nebulization 3 milliLiter(s) Nebulizer every 6 hours  aspirin enteric coated 81 milliGRAM(s) Oral daily  ATENolol  Tablet 50 milliGRAM(s) Oral daily  atorvastatin 20 milliGRAM(s) Oral at bedtime  buDESOnide   0.5 milliGRAM(s) Respule 0.5 milliGRAM(s) Inhalation every 12 hours  furosemide    Tablet 40 milliGRAM(s) Oral daily  guaiFENesin    Syrup 200 milliGRAM(s) Oral every 4 hours  lactobacillus acidophilus 1 Tablet(s) Oral every 12 hours  meropenem IVPB      meropenem IVPB 1000 milliGRAM(s) IV Intermittent every 8 hours  pantoprazole  Injectable 40 milliGRAM(s) IV Push every 12 hours  potassium chloride    Tablet ER 20 milliEquivalent(s) Oral daily  vancomycin  IVPB 500 milliGRAM(s) IV Intermittent every 12 hours    MEDICATIONS  (PRN):  acetaminophen   Tablet. 650 milliGRAM(s) Oral every 6 hours PRN Moderate Pain (4 - 6)  acetaminophen  Suppository 650 milliGRAM(s) Rectal every 6 hours PRN For Temp greater than 38 C (100.4 F)    	                   LABS REVIEWED    H/H: 11.3/36.4   01-27 @ 07:51    BUN/CR: --/--  01-27 @ 07:51    Albumin: --  01-27 @ 07:51    CRP/SED RATE: --/-- 01-27 @ 07:51    Sodium: --  01-27 @ 07:51  H/H: --/--   01-27 @ 07:50    BUN/CR: 33/0.99  01-27 @ 07:50    Albumin: --  01-27 @ 07:50    CRP/SED RATE: --/-- 01-27 @ 07:50    Sodium: 147  01-27 @ 07:50                  	  ADVANCED DIRECTIVES:   FULL CODE [   ]  DNR [x  ]    DNI [x  ]     MOLST [x  ]  PSYCHOSOCIAL-SPIRITUAL ASSESSMENT:       Reviewed       GOALS OF CARE DISCUSSION       Palliative care info/counseling provided	           Family meeting       Advanced Directives addressed	           See previous Palliative Medicine Note  	        REFERRALS	        Palliative Med        Unit SW/Case Mgmt              Speech/Swallow        Hospice             Nutrition         Functional Assessment: KPS:  <20             PPS:v poor         FINAL IMPRESSION AND RECOMMENDATIONS:  adv age w multiple comorbidities   prognosis v poor   agree w hospice   sx controll

## 2018-01-27 NOTE — PROGRESS NOTE ADULT - SUBJECTIVE AND OBJECTIVE BOX
TMAX - 98.8    On day # 16 Meropenem / # 16 Vanco now    Vital Signs Last 24 Hrs  T(C): 37.3 (27 Jan 2018 17:47), Max: 37.3 (27 Jan 2018 17:47)  T(F): 99.1 (27 Jan 2018 17:47), Max: 99.1 (27 Jan 2018 17:47)  HR: 82 (27 Jan 2018 17:47) (69 - 89)  BP: 123/66 (27 Jan 2018 17:47) (121/50 - 158/68)  BP(mean): --  RR: 17 (27 Jan 2018 17:47) (16 - 18)  SpO2: 100% (27 Jan 2018 17:47) (94% - 100%)  Supplemental O2:  on NC O2 now      Awake, alert, c/o some back discomfort secondary to lying in bed now, but otherwise feels better.  Asking for water to drink - does not like the thickened water.  No co cp or SOB.  Coughing intermittently.  Speech slightly difficult to understand as patient's dentures have been taken out.      PHYSICAL EXAM  General:  awake, alert today, cooperative, lying semi-upright in bed, in NAD with NCO2 in place  HEENT:  conj pink, sclerae anicteric, PERRLA, no oral lesions noted, dentures have been removed  Neck:  semi-supple, no nodes noted  Heart: RR   Lungs:  few rhonchi at bases bilaterally   Abdomen:  soft, BS +, nontender to palpation  No CVA or Spinal tenderness noted  Extremities:  no edema LE's                     no calf tenderness  Skin:  warm, dry, no rash noted        I&O's Summary :    26 Jan 2018 07:01  -  27 Jan 2018 07:00  --------------------------------------------------------  IN: 200 mL / OUT: 1050 mL / NET: -850 mL    27 Jan 2018 07:01  -  27 Jan 2018 18:32  --------------------------------------------------------  IN: 300 mL / OUT: 280 mL / NET: 20 mL        LABS:  CBC Full  -  ( 27 Jan 2018 07:51 )  WBC Count : 16.91 K/uL  Hemoglobin : 11.3 g/dL  Hematocrit : 36.4 %  Platelet Count - Automated : 349 K/uL  Mean Cell Volume : 95.3 fl  Mean Cell Hemoglobin : 29.6 pg  Mean Cell Hemoglobin Concentration : 31.0 gm/dL  Auto Neutrophil # : x  Auto Lymphocyte # : x  Auto Monocyte # : x  Auto Eosinophil # : x  Auto Basophil # : x  Auto Neutrophil % : x  Auto Lymphocyte % : x  Auto Monocyte % : x  Auto Eosinophil % : x  Auto Basophil % : x    01-27    147<H>  |  105  |  33<H>  ----------------------------<  89  3.4<L>   |  36<H>  |  0.99    Ca    8.3<L>      27 Jan 2018 07:50      Sedimentation Rate, Erythrocyte: 58 mm/hr (01-23 @ 07:04)    Vancomycin Level, Trough: 17.9 ug/mL (01-24 @ 21:55)        Radiology:   CT HEAD -  < from: CT Head No Cont (01.26.18 @ 10:10) >  INTERPRETATION:  HISTORY: Altered mental status    Comparison 01/06/18    Evaluation demonstrates no evidence of mass-effect or midline shift. No   intraparenchymal mass lesions or hemorrhage is identified. There is   generalized age typical  atrophy and chronic white matter degeneration.    There is no evidence of hydrocephalus. No extra-axial collections are   noted.    The bone windows demonstrate no gross osseous abnormalities.        Impression:  1. Unremarkable noncontrast CT scan of the brain.                                  CXR - < from: Xray Chest 1 View AP-PORTABLE IMMEDIATE (01.26.18 @ 11:33) >  COMPARISON: 1/22/2018    FINDINGS:        There are bilateral lower lung opacities and pleural effusions. The   cardiac and mediastinal contours are prominent, which may be due to   magnification from AP technique and shallow inspiration. The osseous   structures are intact.    IMPRESSION:    Bilateral lower lung opacities and pleural effusions.        Impression:  Slowly improving again on present ab rx with Meropenem + Vanco for rx of Sepsis secondary to Bilateral PNA and Influenza A.      Suggestions:  Will continue present ab rx a few more days and follow-up WBC's.  Follow temps and labs.  Discussed in detail with patient's son at bedside.

## 2018-01-28 LAB
ANION GAP SERPL CALC-SCNC: 3 MMOL/L — LOW (ref 5–17)
BUN SERPL-MCNC: 30 MG/DL — HIGH (ref 7–23)
CALCIUM SERPL-MCNC: 8.6 MG/DL — SIGNIFICANT CHANGE UP (ref 8.5–10.1)
CHLORIDE SERPL-SCNC: 107 MMOL/L — SIGNIFICANT CHANGE UP (ref 96–108)
CO2 SERPL-SCNC: 37 MMOL/L — HIGH (ref 22–31)
CREAT SERPL-MCNC: 0.95 MG/DL — SIGNIFICANT CHANGE UP (ref 0.5–1.3)
GLUCOSE SERPL-MCNC: 98 MG/DL — SIGNIFICANT CHANGE UP (ref 70–99)
HCT VFR BLD CALC: 37.4 % — LOW (ref 39–50)
HGB BLD-MCNC: 11.8 G/DL — LOW (ref 13–17)
MCHC RBC-ENTMCNC: 30.2 PG — SIGNIFICANT CHANGE UP (ref 27–34)
MCHC RBC-ENTMCNC: 31.6 GM/DL — LOW (ref 32–36)
MCV RBC AUTO: 95.7 FL — SIGNIFICANT CHANGE UP (ref 80–100)
NRBC # BLD: 0 /100 WBCS — SIGNIFICANT CHANGE UP (ref 0–0)
PLATELET # BLD AUTO: 367 K/UL — SIGNIFICANT CHANGE UP (ref 150–400)
POTASSIUM SERPL-MCNC: 3.5 MMOL/L — SIGNIFICANT CHANGE UP (ref 3.5–5.3)
POTASSIUM SERPL-SCNC: 3.5 MMOL/L — SIGNIFICANT CHANGE UP (ref 3.5–5.3)
RBC # BLD: 3.91 M/UL — LOW (ref 4.2–5.8)
RBC # FLD: 13.1 % — SIGNIFICANT CHANGE UP (ref 10.3–14.5)
SODIUM SERPL-SCNC: 147 MMOL/L — HIGH (ref 135–145)
WBC # BLD: 15.15 K/UL — HIGH (ref 3.8–10.5)
WBC # FLD AUTO: 15.15 K/UL — HIGH (ref 3.8–10.5)

## 2018-01-28 RX ORDER — HALOPERIDOL DECANOATE 100 MG/ML
0.25 INJECTION INTRAMUSCULAR
Qty: 0 | Refills: 0 | Status: DISCONTINUED | OUTPATIENT
Start: 2018-01-28 | End: 2018-02-13

## 2018-01-28 RX ORDER — HALOPERIDOL DECANOATE 100 MG/ML
0.5 INJECTION INTRAMUSCULAR
Qty: 0 | Refills: 0 | Status: DISCONTINUED | OUTPATIENT
Start: 2018-01-28 | End: 2018-01-29

## 2018-01-28 RX ADMIN — Medication 1 TABLET(S): at 17:09

## 2018-01-28 RX ADMIN — MEROPENEM 100 MILLIGRAM(S): 1 INJECTION INTRAVENOUS at 06:48

## 2018-01-28 RX ADMIN — Medication 200 MILLIGRAM(S): at 14:52

## 2018-01-28 RX ADMIN — Medication 3 MILLILITER(S): at 12:25

## 2018-01-28 RX ADMIN — Medication 3 MILLILITER(S): at 05:46

## 2018-01-28 RX ADMIN — Medication 81 MILLIGRAM(S): at 11:15

## 2018-01-28 RX ADMIN — Medication 200 MILLIGRAM(S): at 05:58

## 2018-01-28 RX ADMIN — ATENOLOL 50 MILLIGRAM(S): 25 TABLET ORAL at 05:58

## 2018-01-28 RX ADMIN — Medication 100 MILLIGRAM(S): at 10:59

## 2018-01-28 RX ADMIN — Medication 3 MILLILITER(S): at 12:26

## 2018-01-28 RX ADMIN — Medication 0.5 MILLIGRAM(S): at 17:15

## 2018-01-28 RX ADMIN — Medication 20 MILLIEQUIVALENT(S): at 11:15

## 2018-01-28 RX ADMIN — Medication 3 MILLILITER(S): at 23:57

## 2018-01-28 RX ADMIN — Medication 3 MILLILITER(S): at 17:12

## 2018-01-28 RX ADMIN — Medication 200 MILLIGRAM(S): at 17:09

## 2018-01-28 RX ADMIN — PANTOPRAZOLE SODIUM 40 MILLIGRAM(S): 20 TABLET, DELAYED RELEASE ORAL at 05:58

## 2018-01-28 RX ADMIN — Medication 3 MILLILITER(S): at 05:45

## 2018-01-28 RX ADMIN — Medication 3 MILLILITER(S): at 00:04

## 2018-01-28 RX ADMIN — ATORVASTATIN CALCIUM 20 MILLIGRAM(S): 80 TABLET, FILM COATED ORAL at 21:46

## 2018-01-28 RX ADMIN — Medication 200 MILLIGRAM(S): at 21:46

## 2018-01-28 RX ADMIN — MEROPENEM 100 MILLIGRAM(S): 1 INJECTION INTRAVENOUS at 21:46

## 2018-01-28 RX ADMIN — PANTOPRAZOLE SODIUM 40 MILLIGRAM(S): 20 TABLET, DELAYED RELEASE ORAL at 17:09

## 2018-01-28 RX ADMIN — HALOPERIDOL DECANOATE 0.25 MILLIGRAM(S): 100 INJECTION INTRAMUSCULAR at 17:09

## 2018-01-28 RX ADMIN — Medication 100 MILLIGRAM(S): at 23:56

## 2018-01-28 RX ADMIN — MEROPENEM 100 MILLIGRAM(S): 1 INJECTION INTRAVENOUS at 14:52

## 2018-01-28 RX ADMIN — Medication 200 MILLIGRAM(S): at 10:59

## 2018-01-28 RX ADMIN — Medication 3 MILLILITER(S): at 17:11

## 2018-01-28 RX ADMIN — Medication 0.5 MILLIGRAM(S): at 05:46

## 2018-01-28 RX ADMIN — Medication 40 MILLIGRAM(S): at 05:58

## 2018-01-28 RX ADMIN — Medication 1 TABLET(S): at 05:58

## 2018-01-28 NOTE — PROGRESS NOTE ADULT - SUBJECTIVE AND OBJECTIVE BOX
HPI:  89 y/o white male with hx Paroxysmal A Fib, HTN, Increased Cholesterol, Gout, s/p Rt Cataract Surgery, [facility] with SOB initially requiring NRB O2 and found to be febrile to 101.7 with wheezing and rhonchi on physical exam.  W/u with Rapid Influenza testing was done and now reported Positive for Influenza A and 2 Blood Cx's and Urine Cx were obtained and pending.  CXR was done and reported now with a RLL Infiltrate along with trace Pleural effusion.  Patient is originally from his own home and was admitted to [facility] from 12/18 to 12/22 during which time he was rx'ed for Multifocal Rt PNA with Zosyn and Vanco and then d/c'ed to [facility] on po Augmentin.  Patient was again seen in the ER on 1/6 after he apparently tripped on a telephone wire while at [facility] and sustained a laceration to the Lt Scalp which required sutures and patient was then d/c'ed  back to [facility].  Patient now c/o cough with only intermittent sputum production since yesterday along with increased SOB and fatigue.  No c/o cp, no abdominal pain, no N/V but c/o soft stools which he cannot always control.  No c/o dysuria or hesitancy, and claims his appetite is fair.   Patient was begun empirically on Zosyn and Vanco 2 days prior to ER but got > SOB (13 Jan 2018 09:33)  HPI:        SYMPTOMS---	                   DIDILITY    OTHER REVIEW OF SYSTEMS:  UNABLE TO OBTAIN  due to: SEDATION, CONFUSION    Baseline ADLs (prior to admission):  Independent [ ] moderately [ ] fully   Dependent   [ ] moderately [ ]fully    	                 T(C): 37.1 (01-28-18 @ 11:39), Max: 37.3 (01-27-18 @ 17:47)  T(F): 98.7 (01-28-18 @ 11:39), Max: 99.1 (01-27-18 @ 17:47)  HR: 74 (01-28-18 @ 12:30) (66 - 91)  BP: 128/55 (01-28-18 @ 11:39) (123/66 - 138/60)  RR: 17 (01-28-18 @ 11:39) (17 - 19)  SpO2: 92% (01-28-18 @ 12:30) (92% - 100%)  Wt(kg): --      GENERAL:    EYES : non icteric :               Other:     HEENT:      	atraumatic, normocephalic, PEERLA, sclera anicteric, dry oral mucosa   NECK:          Trach:        Vent:  CVS:          Tachypnic:               Bradycardic:              Normal:		   RESP:        tachypnic:                Dyspenic :                  Comfortable:	  GI:          NGT/PEG 	  :      ortiz      	  MUSC:       	Normal	Weakness	  Edema	   NEURO:     	No focal deficit	  Focal  PSYCH:           Alert	Oriented	  Lethargic     SKIN:         	Normal	  Other  LYMPH:      	Normal	  Other  	                     ALLERGIES: aspirin (Stomach Upset)    MEDICATIONS  (STANDING):  acetylcysteine 10% Inhalation 3 milliLiter(s) Inhalation every 6 hours  ALBUTerol/ipratropium for Nebulization 3 milliLiter(s) Nebulizer every 6 hours  aspirin enteric coated 81 milliGRAM(s) Oral daily  ATENolol  Tablet 50 milliGRAM(s) Oral daily  atorvastatin 20 milliGRAM(s) Oral at bedtime  buDESOnide   0.5 milliGRAM(s) Respule 0.5 milliGRAM(s) Inhalation every 12 hours  furosemide    Tablet 40 milliGRAM(s) Oral daily  guaiFENesin    Syrup 200 milliGRAM(s) Oral every 4 hours  haloperidol     Tablet 0.25 milliGRAM(s) Oral two times a day  lactobacillus acidophilus 1 Tablet(s) Oral every 12 hours  meropenem IVPB      meropenem IVPB 1000 milliGRAM(s) IV Intermittent every 8 hours  pantoprazole  Injectable 40 milliGRAM(s) IV Push every 12 hours  potassium chloride    Tablet ER 20 milliEquivalent(s) Oral daily  vancomycin  IVPB 500 milliGRAM(s) IV Intermittent every 12 hours    MEDICATIONS  (PRN):  acetaminophen   Tablet. 650 milliGRAM(s) Oral every 6 hours PRN Moderate Pain (4 - 6)  acetaminophen  Suppository 650 milliGRAM(s) Rectal every 6 hours PRN For Temp greater than 38 C (100.4 F)  haloperidol    Injectable 0.5 milliGRAM(s) IntraMuscular two times a day PRN agitation    	                   LABS REVIEWED    H/H: 11.8/37.4   01-28 @ 07:25    BUN/CR: 30/0.95  01-28 @ 07:25    Albumin: --  01-28 @ 07:25    CRP/SED RATE: --/-- 01-28 @ 07:25    Sodium: 147  01-28 @ 07:25                  	  ADVANCED DIRECTIVES:   FULL CODE [   ]  DNR [ xx ]    DNI [x  ]     MOLST [x  ]  PSYCHOSOCIAL-SPIRITUAL ASSESSMENT:       Reviewed       GOALS OF CARE DISCUSSION       Palliative care info/counseling provided	           Family meeting       Advanced Directives addressed	           See previous Palliative Medicine Note  	        REFERRALS	        Palliative Med        Unit SW/Case Mgmt              Speech/Swallow        Hospice             Nutrition         Functional Assessment: KPS:     <20          PPS: v poor      FINAL IMPRESSION AND RECOMMENDATIONS:  pt s prognosis poor   on sanchez roeo  sx controlled

## 2018-01-28 NOTE — PROGRESS NOTE ADULT - SUBJECTIVE AND OBJECTIVE BOX
Patient is a 89y old  Male who presents with a chief complaint of SOB, cough (13 Jan 2018 09:33)      INTERVAL HPI/OVERNIGHT EVENTS: pt is agitated combative, trying to pool Avalos    MEDICATIONS  (STANDING):  acetylcysteine 10% Inhalation 3 milliLiter(s) Inhalation every 6 hours  ALBUTerol/ipratropium for Nebulization 3 milliLiter(s) Nebulizer every 6 hours  aspirin enteric coated 81 milliGRAM(s) Oral daily  ATENolol  Tablet 50 milliGRAM(s) Oral daily  atorvastatin 20 milliGRAM(s) Oral at bedtime  buDESOnide   0.5 milliGRAM(s) Respule 0.5 milliGRAM(s) Inhalation every 12 hours  furosemide    Tablet 40 milliGRAM(s) Oral daily  guaiFENesin    Syrup 200 milliGRAM(s) Oral every 4 hours  haloperidol     Tablet 0.25 milliGRAM(s) Oral two times a day  lactobacillus acidophilus 1 Tablet(s) Oral every 12 hours  meropenem IVPB      meropenem IVPB 1000 milliGRAM(s) IV Intermittent every 8 hours  pantoprazole  Injectable 40 milliGRAM(s) IV Push every 12 hours  potassium chloride    Tablet ER 20 milliEquivalent(s) Oral daily  vancomycin  IVPB 500 milliGRAM(s) IV Intermittent every 12 hours    MEDICATIONS  (PRN):  acetaminophen   Tablet. 650 milliGRAM(s) Oral every 6 hours PRN Moderate Pain (4 - 6)  acetaminophen  Suppository 650 milliGRAM(s) Rectal every 6 hours PRN For Temp greater than 38 C (100.4 F)  haloperidol    Injectable 0.5 milliGRAM(s) IntraMuscular two times a day PRN agitation      Allergies    aspirin (Stomach Upset)    Intolerances        REVIEW OF SYSTEMS:  confused agitated        Vital Signs Last 24 Hrs  T(C): 36.9 (28 Jan 2018 05:04), Max: 37.3 (27 Jan 2018 17:47)  T(F): 98.4 (28 Jan 2018 05:04), Max: 99.1 (27 Jan 2018 17:47)  HR: 84 (28 Jan 2018 05:04) (69 - 91)  BP: 129/67 (28 Jan 2018 05:04) (121/50 - 138/60)  BP(mean): --  RR: 19 (28 Jan 2018 05:04) (16 - 19)  SpO2: 100% (28 Jan 2018 05:04) (94% - 100%)    PHYSICAL EXAM:  general       HEENT wnl  CHEST/LUNG: Clear to percussion bilaterally; < BS at bases  HEART: Regular rate and rhythm; No murmurs, rubs, or gallops  ABDOMEN: Soft, Nontender, Nondistended; Bowel sounds present  EXTREMITIES:  2+ Peripheral Pulses, No clubbing, cyanosis, or edema  LYMPH: No lymphadenopathy noted  SKIN: No rashes or lesions    LABS:                        11.8   15.15 )-----------( 367      ( 28 Jan 2018 07:25 )             37.4     01-28    147<H>  |  107  |  30<H>  ----------------------------<  98  3.5   |  37<H>  |  0.95    Ca    8.6      28 Jan 2018 07:25          CAPILLARY BLOOD GLUCOSE                    RADIOLOGY & ADDITIONAL TESTS:    Imaging Personally Reviewed:  [y ] YES  [ ] NO    Consultant(s) Notes Reviewed:  [y ] YES  [ ] NO    Care Discussed with Consultants/Other Providers [ ] YES  [ ] NO    PROBLEMS:  PNEUMONIA, SEPSIS  DIFFICULTY BREATHING  Pneumonia  Delirium due to another medical condition, acute, mixed level of activity      Care discussed with family,         [  ]   yes  [  ]  No    imp:    stable[ ]    unstable[ v ]     improving [   ]       unchanged  [  ]                Plans:  Continue present AB, Haldol 0.5 mg IM stat and prn  Haldol 0.25 mg po bid  Comfort care

## 2018-01-29 ENCOUNTER — TRANSCRIPTION ENCOUNTER (OUTPATIENT)
Age: 83
End: 2018-01-29

## 2018-01-29 LAB
ANION GAP SERPL CALC-SCNC: 6 MMOL/L — SIGNIFICANT CHANGE UP (ref 5–17)
BUN SERPL-MCNC: 34 MG/DL — HIGH (ref 7–23)
CALCIUM SERPL-MCNC: 8.5 MG/DL — SIGNIFICANT CHANGE UP (ref 8.5–10.1)
CHLORIDE SERPL-SCNC: 108 MMOL/L — SIGNIFICANT CHANGE UP (ref 96–108)
CO2 SERPL-SCNC: 35 MMOL/L — HIGH (ref 22–31)
CREAT SERPL-MCNC: 0.98 MG/DL — SIGNIFICANT CHANGE UP (ref 0.5–1.3)
GLUCOSE SERPL-MCNC: 87 MG/DL — SIGNIFICANT CHANGE UP (ref 70–99)
HCT VFR BLD CALC: 35.4 % — LOW (ref 39–50)
HGB BLD-MCNC: 11.1 G/DL — LOW (ref 13–17)
MCHC RBC-ENTMCNC: 29.6 PG — SIGNIFICANT CHANGE UP (ref 27–34)
MCHC RBC-ENTMCNC: 31.4 GM/DL — LOW (ref 32–36)
MCV RBC AUTO: 94.4 FL — SIGNIFICANT CHANGE UP (ref 80–100)
NRBC # BLD: 0 /100 WBCS — SIGNIFICANT CHANGE UP (ref 0–0)
PLATELET # BLD AUTO: 376 K/UL — SIGNIFICANT CHANGE UP (ref 150–400)
POTASSIUM SERPL-MCNC: 3.5 MMOL/L — SIGNIFICANT CHANGE UP (ref 3.5–5.3)
POTASSIUM SERPL-SCNC: 3.5 MMOL/L — SIGNIFICANT CHANGE UP (ref 3.5–5.3)
RBC # BLD: 3.75 M/UL — LOW (ref 4.2–5.8)
RBC # FLD: 13.1 % — SIGNIFICANT CHANGE UP (ref 10.3–14.5)
SODIUM SERPL-SCNC: 149 MMOL/L — HIGH (ref 135–145)
WBC # BLD: 19.93 K/UL — HIGH (ref 3.8–10.5)
WBC # FLD AUTO: 19.93 K/UL — HIGH (ref 3.8–10.5)

## 2018-01-29 RX ADMIN — MEROPENEM 100 MILLIGRAM(S): 1 INJECTION INTRAVENOUS at 14:17

## 2018-01-29 RX ADMIN — Medication 3 MILLILITER(S): at 05:45

## 2018-01-29 RX ADMIN — Medication 3 MILLILITER(S): at 00:28

## 2018-01-29 RX ADMIN — PANTOPRAZOLE SODIUM 40 MILLIGRAM(S): 20 TABLET, DELAYED RELEASE ORAL at 17:30

## 2018-01-29 RX ADMIN — Medication 3 MILLILITER(S): at 17:09

## 2018-01-29 RX ADMIN — ATORVASTATIN CALCIUM 20 MILLIGRAM(S): 80 TABLET, FILM COATED ORAL at 22:04

## 2018-01-29 RX ADMIN — Medication 200 MILLIGRAM(S): at 14:17

## 2018-01-29 RX ADMIN — Medication 0.5 MILLIGRAM(S): at 05:43

## 2018-01-29 RX ADMIN — Medication 200 MILLIGRAM(S): at 05:02

## 2018-01-29 RX ADMIN — Medication 1 TABLET(S): at 17:30

## 2018-01-29 RX ADMIN — Medication 3 MILLILITER(S): at 11:20

## 2018-01-29 RX ADMIN — Medication 40 MILLIGRAM(S): at 05:01

## 2018-01-29 RX ADMIN — Medication 100 MILLIGRAM(S): at 22:06

## 2018-01-29 RX ADMIN — Medication 3 MILLILITER(S): at 05:44

## 2018-01-29 RX ADMIN — Medication 1 TABLET(S): at 05:01

## 2018-01-29 RX ADMIN — Medication 3 MILLILITER(S): at 00:27

## 2018-01-29 RX ADMIN — HALOPERIDOL DECANOATE 0.25 MILLIGRAM(S): 100 INJECTION INTRAMUSCULAR at 17:30

## 2018-01-29 RX ADMIN — MEROPENEM 100 MILLIGRAM(S): 1 INJECTION INTRAVENOUS at 22:06

## 2018-01-29 RX ADMIN — ATENOLOL 50 MILLIGRAM(S): 25 TABLET ORAL at 05:01

## 2018-01-29 RX ADMIN — PANTOPRAZOLE SODIUM 40 MILLIGRAM(S): 20 TABLET, DELAYED RELEASE ORAL at 05:02

## 2018-01-29 RX ADMIN — Medication 3 MILLILITER(S): at 17:08

## 2018-01-29 RX ADMIN — Medication 0.5 MILLIGRAM(S): at 17:09

## 2018-01-29 RX ADMIN — Medication 200 MILLIGRAM(S): at 02:27

## 2018-01-29 RX ADMIN — HALOPERIDOL DECANOATE 0.25 MILLIGRAM(S): 100 INJECTION INTRAMUSCULAR at 05:02

## 2018-01-29 RX ADMIN — MEROPENEM 100 MILLIGRAM(S): 1 INJECTION INTRAVENOUS at 05:03

## 2018-01-29 RX ADMIN — Medication 200 MILLIGRAM(S): at 17:30

## 2018-01-29 RX ADMIN — Medication 200 MILLIGRAM(S): at 22:03

## 2018-01-29 RX ADMIN — Medication 81 MILLIGRAM(S): at 11:54

## 2018-01-29 RX ADMIN — Medication 200 MILLIGRAM(S): at 11:54

## 2018-01-29 NOTE — DISCHARGE NOTE ADULT - MEDICATION SUMMARY - MEDICATIONS TO STOP TAKING
I will STOP taking the medications listed below when I get home from the hospital:    Zosyn 3 g-0.375 g/50 mL intravenous solution  -- intravenous every 12 hours    vancomycin 750 mg/150 mL-D5% intravenous solution  -- intravenous every 12 hours

## 2018-01-29 NOTE — DISCHARGE NOTE ADULT - CARE PLAN
Principal Discharge DX:	Gram negative sepsis  Goal:	Hospice comfort care  Assessment and plan of treatment:	Hospice comfort care  Secondary Diagnosis:	Influenza A  Assessment and plan of treatment:	Completed Tamiflu  Secondary Diagnosis:	HTN (hypertension)  Secondary Diagnosis:	Paroxysmal atrial fibrillation  Secondary Diagnosis:	Delirium due to another medical condition, acute, mixed level of activity  Secondary Diagnosis:	Multifocal pneumonia Principal Discharge DX:	Gram negative sepsis  Goal:	To transition from LTC -> hospice  Assessment and plan of treatment:	Hospice comfort care  Secondary Diagnosis:	Influenza A  Assessment and plan of treatment:	Completed Tamiflu  Secondary Diagnosis:	HTN (hypertension)  Secondary Diagnosis:	Paroxysmal atrial fibrillation  Secondary Diagnosis:	Delirium due to another medical condition, acute, mixed level of activity  Secondary Diagnosis:	Multifocal pneumonia Principal Discharge DX:	Gram negative sepsis  Goal:	To transition from LTC -> hospice  Assessment and plan of treatment:	Hospice comfort care  Secondary Diagnosis:	Influenza A  Assessment and plan of treatment:	Completed Tamiflu  Secondary Diagnosis:	HTN (hypertension)  Secondary Diagnosis:	Paroxysmal atrial fibrillation  Secondary Diagnosis:	Delirium due to another medical condition, acute, mixed level of activity  Secondary Diagnosis:	Multifocal pneumonia  Assessment and plan of treatment:	Completed ABX  Secondary Diagnosis:	Failure to thrive in adult  Assessment and plan of treatment:	For comfort care

## 2018-01-29 NOTE — DISCHARGE NOTE ADULT - MEDICATION SUMMARY - MEDICATIONS TO TAKE
I will START or STAY ON the medications listed below when I get home from the hospital:    aspirin 81 mg oral delayed release tablet  -- 1 tab(s) by mouth once a day  -- Indication: For Preventative    acetaminophen 650 mg rectal suppository  -- 1 suppository(ies) rectally every 6 hours, As needed, For Temp greater than 38 C (100.4 F)  -- Indication: For Fever    acetaminophen 325 mg oral tablet  -- 2 tab(s) by mouth every 6 hours, As needed, Moderate Pain (4 - 6)  -- Indication: For Pain    morphine 20 mg/mL oral concentrate  -- 10 milligram(s) by mouth every 4 hours  -- Indication: For Comfort care    Milk of Magnesia 8% oral suspension  -- 15 milliliter(s) by mouth once a day (at bedtime), As Needed  -- Indication: For Antacid    tamsulosin 0.4 mg oral capsule  -- 1 cap(s) by mouth once a day (at bedtime)  -- Indication: For BPH    atenolol 50 mg oral tablet  -- 1 tab(s) by mouth once a day  -- Indication: For HTN (hypertension)    albuterol 90 mcg/inh inhalation aerosol  -- 1 puff(s) inhaled every 4 hours  -- Indication: For SOB    ipratropium-albuterol 0.5 mg-2.5 mg/3 mLinhalation solution  -- 3 milliliter(s) inhaled every 6 hours, As needed, Shortness of Breath and/or Wheezing  -- Indication: For SOB    tiotropium 18 mcg inhalation capsule  -- 1 cap(s) inhaled once a day  -- Indication: For SOB    furosemide 40 mg oral tablet  -- 1 tab(s) by mouth once a day  -- Indication: For HTN (hypertension)    guaiFENesin 100 mg/5 mL oral liquid  -- 10 milliliter(s) by mouth every 4 hours  -- Indication: For Cough    Senna 8.6 mg oral tablet  -- 1 tab(s) by mouth once a day (at bedtime)  -- Indication: For Constipation    Colace 100 mg oral capsule  -- 1 cap(s) by mouth 2 times a day  -- Indication: For Constipation    potassium chloride 20 mEq oral tablet, extended release  -- 1 tab(s) by mouth once a day  -- Indication: For Supplement    Bacid (LAC) oral tablet  -- 2 tab(s) by mouth once a day  -- Indication: For Probiotic I will START or STAY ON the medications listed below when I get home from the hospital:    aspirin 81 mg oral delayed release tablet  -- 1 tab(s) by mouth once a day  -- Indication: For Preventative    acetaminophen 650 mg rectal suppository  -- 1 suppository(ies) rectally every 6 hours, As needed, For Temp greater than 38 C (100.4 F)  -- Indication: For Fever    acetaminophen 325 mg oral tablet  -- 2 tab(s) by mouth every 6 hours, As needed, Moderate Pain (4 - 6)  -- Indication: For Pain    morphine 20 mg/mL oral concentrate  -- 10 milligram(s) by mouth every 4 hours  -- Indication: For Comfort care    Milk of Magnesia 8% oral suspension  -- 15 milliliter(s) by mouth once a day (at bedtime), As Needed  -- Indication: For Antacid    tamsulosin 0.4 mg oral capsule  -- 1 cap(s) by mouth once a day (at bedtime)  -- Indication: For BPH    atenolol 50 mg oral tablet  -- 1 tab(s) by mouth once a day  -- Indication: For HTN (hypertension)    albuterol 90 mcg/inh inhalation aerosol  -- 1 puff(s) inhaled every 4 hours  -- Indication: For SOB    ipratropium-albuterol 0.5 mg-2.5 mg/3 mLinhalation solution  -- 3 milliliter(s) inhaled every 6 hours, As needed, Shortness of Breath and/or Wheezing  -- Indication: For SOB    tiotropium 18 mcg inhalation capsule  -- 1 cap(s) inhaled once a day  -- Indication: For SOB    furosemide 40 mg oral tablet  -- 1 tab(s) by mouth once a day  -- Indication: For HTN (hypertension)    guaiFENesin 100 mg/5 mL oral liquid  -- 10 milliliter(s) by mouth every 4 hours  -- Indication: For Cough    Senna 8.6 mg oral tablet  -- 1 tab(s) by mouth once a day (at bedtime)  -- Indication: For Constipation    Colace 100 mg oral capsule  -- 1 cap(s) by mouth 2 times a day  -- Indication: For Constipation    potassium chloride 20 mEq oral tablet, extended release  -- 1 tab(s) by mouth once a day  -- Indication: For Supplement    Bacid (LAC) oral tablet  -- 2 tab(s) by mouth once a day  -- Indication: For Probiotic    Levaquin 250 mg oral tablet  -- 1 tab(s) by mouth every 48 hours   -- Avoid prolonged or excessive exposure to direct and/or artificial sunlight while taking this medication.  Do not take dairy products, antacids, or iron preparations within one hour of this medication.  Finish all this medication unless otherwise directed by prescriber.  May cause drowsiness or dizziness.  Medication should be taken with plenty of water.    -- Indication: For Urinary tract infection

## 2018-01-29 NOTE — PROGRESS NOTE ADULT - SUBJECTIVE AND OBJECTIVE BOX
Patient is a 89y old  Male who presents with a chief complaint of SOB, cough (13 Jan 2018 09:33)      INTERVAL HPI/OVERNIGHT EVENTS:not arousable    MEDICATIONS  (STANDING):  acetylcysteine 10% Inhalation 3 milliLiter(s) Inhalation every 6 hours  ALBUTerol/ipratropium for Nebulization 3 milliLiter(s) Nebulizer every 6 hours  aspirin enteric coated 81 milliGRAM(s) Oral daily  ATENolol  Tablet 50 milliGRAM(s) Oral daily  atorvastatin 20 milliGRAM(s) Oral at bedtime  buDESOnide   0.5 milliGRAM(s) Respule 0.5 milliGRAM(s) Inhalation every 12 hours  furosemide    Tablet 40 milliGRAM(s) Oral daily  guaiFENesin    Syrup 200 milliGRAM(s) Oral every 4 hours  haloperidol     Tablet 0.25 milliGRAM(s) Oral two times a day  lactobacillus acidophilus 1 Tablet(s) Oral every 12 hours  meropenem IVPB      meropenem IVPB 1000 milliGRAM(s) IV Intermittent every 8 hours  pantoprazole  Injectable 40 milliGRAM(s) IV Push every 12 hours  potassium chloride    Tablet ER 20 milliEquivalent(s) Oral daily  vancomycin  IVPB 500 milliGRAM(s) IV Intermittent every 12 hours    MEDICATIONS  (PRN):  acetaminophen   Tablet. 650 milliGRAM(s) Oral every 6 hours PRN Moderate Pain (4 - 6)  acetaminophen  Suppository 650 milliGRAM(s) Rectal every 6 hours PRN For Temp greater than 38 C (100.4 F)  haloperidol    Injectable 0.5 milliGRAM(s) IntraMuscular two times a day PRN agitation      Allergies    aspirin (Stomach Upset)    Intolerances        REVIEW OF SYSTEMS:    unarousable      Vital Signs Last 24 Hrs  T(C): 36.8 (29 Jan 2018 17:58), Max: 37.3 (29 Jan 2018 05:17)  T(F): 98.2 (29 Jan 2018 17:58), Max: 99.1 (29 Jan 2018 05:17)  HR: 80 (29 Jan 2018 17:58) (73 - 94)  BP: 115/87 (29 Jan 2018 17:58) (115/87 - 153/75)  BP(mean): --  RR: 17 (29 Jan 2018 17:58) (17 - 19)  SpO2: 95% (29 Jan 2018 17:58) (94% - 98%)    PHYSICAL EXAM:  general       HEENT wnl  CHEST/LUNG: Clear to percussion bilaterally; No rales, rhonchi, wheezing, or rubs  HEART: Regular rate and rhythm; No murmurs, rubs, or gallops  ABDOMEN: Soft, Nontender, Nondistended; Bowel sounds present  EXTREMITIES:no c/e  LYMPH: No lymphadenopathy noted  SKIN: No rashes or lesions    LABS:                        11.1   19.93 )-----------( 376      ( 29 Jan 2018 07:01 )             35.4     01-29    149<H>  |  108  |  34<H>  ----------------------------<  87  3.5   |  35<H>  |  0.98    Ca    8.5      29 Jan 2018 07:01          CAPILLARY BLOOD GLUCOSE                    RADIOLOGY & ADDITIONAL TESTS:    Imaging Personally Reviewed:  [y ] YES  [ ] NO    Consultant(s) Notes Reviewed:  [y ] YES  [ ] NO    Care Discussed with Consultants/Other Providers [ ] YES  [ ] NO    PROBLEMS:  PNEUMONIA, SEPSIS  DIFFICULTY BREATHING  Gram negative sepsis  Pneumonia  Delirium due to another medical condition, acute, mixed level of activity      Care discussed with family,         [ v ]   yes  [  ]  No long discussion c son at bedside. For now continue AB. Reassess MS in AM    imp:    stable[ ]    unstable[  ]     improving [   ]       unchanged  [  ]                Plans:  Continue present plans  [v  ] prognosis guarded

## 2018-01-29 NOTE — DISCHARGE NOTE ADULT - SECONDARY DIAGNOSIS.
HTN (hypertension) Paroxysmal atrial fibrillation Delirium due to another medical condition, acute, mixed level of activity Multifocal pneumonia Influenza A Failure to thrive in adult

## 2018-01-29 NOTE — DISCHARGE NOTE ADULT - PATIENT PORTAL LINK FT
“You can access the [facility] Patient Portal, offered by [facility], by registering with the following website: [email or web address]”

## 2018-01-29 NOTE — DISCHARGE NOTE ADULT - HOSPITAL COURSE
89 y/o white male with hx Paroxysmal A Fib, HTN, Increased Cholesterol, Gout, s/p Rt Cataract Surgery, admitted today via the ER from [facility] with SOB initially requiring NRB O2 and found to be febrile to 101.7 with wheezing and rhonchi on physical exam.  W/u with Rapid Influenza testing was done and now reported Positive for Influenza A and 2 Blood Cx's and Urine Cx were obtained and pending.  CXR was done and reported now with a RLL Infiltrate along with trace Pleural effusion.  Patient is originally from his own home and was admitted to [facility] from 12/18 to 12/22 during which time he was rx'ed for Multifocal Rt PNA with Zosyn and Vanco and then d/c'ed to [facility] on po Augmentin.  Patient was again seen in the ER on 1/6 after he apparently tripped on a telephone wire while at [facility] and sustained a laceration to the Lt Scalp which required sutures and patient was then d/c'ed  back to [facility].  Patient now c/o cough with only intermittent sputum production since yesterday along with increased SOB and fatigue.  No c/o cp, no abdominal pain, no N/V but c/o soft stools which he cannot always control.  No c/o dysuria or hesitancy, and claims his appetite is fair.   Patient was begun empirically on Zosyn and Vanco 2 days prior to ER but got > SOB    Family agrees for hospice comfort care. 89 y/o white male with hx Paroxysmal A Fib, HTN, Increased Cholesterol, Gout, s/p Rt Cataract Surgery, admitted today via the ER from [facility] with SOB initially requiring NRB O2 and found to be febrile to 101.7 with wheezing and rhonchi on physical exam.  W/u with Rapid Influenza testing was done and now reported Positive for Influenza A and 2 Blood Cx's and Urine Cx were obtained and pending.  CXR was done and reported now with a RLL Infiltrate along with trace Pleural effusion.  Patient is originally from his own home and was admitted to [facility] from 12/18 to 12/22 during which time he was rx'ed for Multifocal Rt PNA with Zosyn and Vanco and then d/c'ed to [facility] on po Augmentin.  Patient was again seen in the ER on 1/6 after he apparently tripped on a telephone wire while at [facility] and sustained a laceration to the Lt Scalp which required sutures and patient was then d/c'ed  back to [facility].  Patient now c/o cough with only intermittent sputum production since yesterday along with increased SOB and fatigue.  No c/o cp, no abdominal pain, no N/V but c/o soft stools which he cannot always control.  No c/o dysuria or hesitancy, and claims his appetite is fair.   Patient was begun empirically on Zosyn and Vanco 2 days prior to ER but got > SOB    Family agrees for comfort care.

## 2018-01-29 NOTE — DISCHARGE NOTE ADULT - CARE PROVIDER_API CALL
Wilson See (), Family Medicine  [address]  [address]  [place] [address]  Phone: [phone]  Fax: [phone]    Triston Martines), Internal Medicine  [address] [place] [address]  Phone: [phone]  Fax: [phone]    Dora Talbot), TeleHealth  [address]  [place] [address]  Phone: [phone]  Fax: [phone]    Christian Majano (MD), Cardiology; Internal Medicine; Nuclear Cardiology  [address] [place] [address]  Phone: [phone]  Fax: [phone]

## 2018-01-29 NOTE — DISCHARGE NOTE ADULT - PLAN OF CARE
Completed Tamiflu Hospice comfort care To transition from LTC -> hospice Completed ABX For comfort care

## 2018-01-30 LAB
ANION GAP SERPL CALC-SCNC: 9 MMOL/L — SIGNIFICANT CHANGE UP (ref 5–17)
BUN SERPL-MCNC: 33 MG/DL — HIGH (ref 7–23)
CALCIUM SERPL-MCNC: 8.5 MG/DL — SIGNIFICANT CHANGE UP (ref 8.5–10.1)
CHLORIDE SERPL-SCNC: 111 MMOL/L — HIGH (ref 96–108)
CO2 SERPL-SCNC: 32 MMOL/L — HIGH (ref 22–31)
CREAT SERPL-MCNC: 0.98 MG/DL — SIGNIFICANT CHANGE UP (ref 0.5–1.3)
GLUCOSE SERPL-MCNC: 86 MG/DL — SIGNIFICANT CHANGE UP (ref 70–99)
HCT VFR BLD CALC: 38.2 % — LOW (ref 39–50)
HGB BLD-MCNC: 12 G/DL — LOW (ref 13–17)
MCHC RBC-ENTMCNC: 29.6 PG — SIGNIFICANT CHANGE UP (ref 27–34)
MCHC RBC-ENTMCNC: 31.4 GM/DL — LOW (ref 32–36)
MCV RBC AUTO: 94.3 FL — SIGNIFICANT CHANGE UP (ref 80–100)
NRBC # BLD: 0 /100 WBCS — SIGNIFICANT CHANGE UP (ref 0–0)
PLATELET # BLD AUTO: 352 K/UL — SIGNIFICANT CHANGE UP (ref 150–400)
POTASSIUM SERPL-MCNC: 3.5 MMOL/L — SIGNIFICANT CHANGE UP (ref 3.5–5.3)
POTASSIUM SERPL-SCNC: 3.5 MMOL/L — SIGNIFICANT CHANGE UP (ref 3.5–5.3)
RBC # BLD: 4.05 M/UL — LOW (ref 4.2–5.8)
RBC # FLD: 13.1 % — SIGNIFICANT CHANGE UP (ref 10.3–14.5)
SODIUM SERPL-SCNC: 152 MMOL/L — HIGH (ref 135–145)
WBC # BLD: 18.64 K/UL — HIGH (ref 3.8–10.5)
WBC # FLD AUTO: 18.64 K/UL — HIGH (ref 3.8–10.5)

## 2018-01-30 RX ORDER — SODIUM CHLORIDE 9 MG/ML
1000 INJECTION, SOLUTION INTRAVENOUS
Qty: 0 | Refills: 0 | Status: DISCONTINUED | OUTPATIENT
Start: 2018-01-30 | End: 2018-01-30

## 2018-01-30 RX ORDER — SODIUM CHLORIDE 9 MG/ML
1000 INJECTION, SOLUTION INTRAVENOUS
Qty: 0 | Refills: 0 | Status: DISCONTINUED | OUTPATIENT
Start: 2018-01-30 | End: 2018-02-01

## 2018-01-30 RX ADMIN — Medication 3 MILLILITER(S): at 06:51

## 2018-01-30 RX ADMIN — MEROPENEM 100 MILLIGRAM(S): 1 INJECTION INTRAVENOUS at 22:02

## 2018-01-30 RX ADMIN — Medication 200 MILLIGRAM(S): at 11:37

## 2018-01-30 RX ADMIN — ATORVASTATIN CALCIUM 20 MILLIGRAM(S): 80 TABLET, FILM COATED ORAL at 22:02

## 2018-01-30 RX ADMIN — Medication 3 MILLILITER(S): at 17:15

## 2018-01-30 RX ADMIN — ATENOLOL 50 MILLIGRAM(S): 25 TABLET ORAL at 06:04

## 2018-01-30 RX ADMIN — Medication 0.5 MILLIGRAM(S): at 06:54

## 2018-01-30 RX ADMIN — Medication 0.5 MILLIGRAM(S): at 17:16

## 2018-01-30 RX ADMIN — Medication 81 MILLIGRAM(S): at 11:37

## 2018-01-30 RX ADMIN — Medication 3 MILLILITER(S): at 01:05

## 2018-01-30 RX ADMIN — MEROPENEM 100 MILLIGRAM(S): 1 INJECTION INTRAVENOUS at 15:28

## 2018-01-30 RX ADMIN — PANTOPRAZOLE SODIUM 40 MILLIGRAM(S): 20 TABLET, DELAYED RELEASE ORAL at 17:24

## 2018-01-30 RX ADMIN — Medication 200 MILLIGRAM(S): at 15:27

## 2018-01-30 RX ADMIN — HALOPERIDOL DECANOATE 0.25 MILLIGRAM(S): 100 INJECTION INTRAMUSCULAR at 17:24

## 2018-01-30 RX ADMIN — Medication 200 MILLIGRAM(S): at 06:04

## 2018-01-30 RX ADMIN — Medication 200 MILLIGRAM(S): at 22:02

## 2018-01-30 RX ADMIN — Medication 100 MILLIGRAM(S): at 22:02

## 2018-01-30 RX ADMIN — Medication 1 TABLET(S): at 06:08

## 2018-01-30 RX ADMIN — Medication 1 TABLET(S): at 17:24

## 2018-01-30 RX ADMIN — SODIUM CHLORIDE 75 MILLILITER(S): 9 INJECTION, SOLUTION INTRAVENOUS at 11:39

## 2018-01-30 RX ADMIN — MEROPENEM 100 MILLIGRAM(S): 1 INJECTION INTRAVENOUS at 06:06

## 2018-01-30 RX ADMIN — Medication 200 MILLIGRAM(S): at 01:31

## 2018-01-30 RX ADMIN — Medication 100 MILLIGRAM(S): at 11:36

## 2018-01-30 RX ADMIN — Medication 20 MILLIEQUIVALENT(S): at 11:37

## 2018-01-30 RX ADMIN — PANTOPRAZOLE SODIUM 40 MILLIGRAM(S): 20 TABLET, DELAYED RELEASE ORAL at 06:11

## 2018-01-30 RX ADMIN — Medication 3 MILLILITER(S): at 01:04

## 2018-01-30 RX ADMIN — Medication 3 MILLILITER(S): at 06:50

## 2018-01-30 RX ADMIN — Medication 3 MILLILITER(S): at 11:41

## 2018-01-30 RX ADMIN — Medication 200 MILLIGRAM(S): at 17:24

## 2018-01-30 RX ADMIN — Medication 3 MILLILITER(S): at 11:40

## 2018-01-30 RX ADMIN — HALOPERIDOL DECANOATE 0.25 MILLIGRAM(S): 100 INJECTION INTRAMUSCULAR at 06:11

## 2018-01-30 RX ADMIN — Medication 40 MILLIGRAM(S): at 06:10

## 2018-01-30 NOTE — PROGRESS NOTE ADULT - SUBJECTIVE AND OBJECTIVE BOX
pt is on multiple meds   w multiple comorbidities  meds rev  vss  lngs cta   cvs n  abd bs a/p having more sx   ortiz cath   PVS unknown   cont sx management

## 2018-01-30 NOTE — CHART NOTE - NSCHARTNOTEFT_GEN_A_CORE
Assessment:     89 y.o. male c recent discharge (12/27) c PNA; sent to rehab; now admitted from [facility] c SOB, +flu  Per psych note pt lethargic; mental status vacillating between minimally responsive to periods of agitation; b/l mittens on hands to provide destructive behaviors  Hospice referral made (1/26); family not yet to decide on course of action     Factors impacting intake: [ ] none [ ] nausea  [ ] vomiting [ ] diarrhea [ ] constipation  [ ]chewing problems [ ] swallowing issues  [X ] other: AMS    Diet Prescription: Diet, Dysphagia 2 Mechanical Soft-Nectar Consistency Fluid:   Supplement Feeding Modality:  Oral  Ensure Pudding Cans or Servings Per Day:  1       Frequency:  Three Times a day (01-22-18 @ 15:52)    Intake: poor & declining; 0-50% most meals; pt refusing to eat at times    Current Weight: no new wt. Last wt 69.2 kg (1/26); previous wt 70.4 kg (1/22)  % Weight Change: loss of 4% x 2 days; total loss 5.6 kg (8%) x 2 weeks; no edema noted at any time    Pertinent Medications: MEDICATIONS  (STANDING):  acetylcysteine 10% Inhalation 3 milliLiter(s) Inhalation every 6 hours  ALBUTerol/ipratropium for Nebulization 3 milliLiter(s) Nebulizer every 6 hours  aspirin enteric coated 81 milliGRAM(s) Oral daily  ATENolol  Tablet 50 milliGRAM(s) Oral daily  atorvastatin 20 milliGRAM(s) Oral at bedtime  buDESOnide   0.5 milliGRAM(s) Respule 0.5 milliGRAM(s) Inhalation every 12 hours  dextrose 5% + sodium chloride 0.45%. 1000 milliLiter(s) (75 mL/Hr) IV Continuous <Continuous>  furosemide    Tablet 40 milliGRAM(s) Oral daily  guaiFENesin    Syrup 200 milliGRAM(s) Oral every 4 hours  haloperidol     Tablet 0.25 milliGRAM(s) Oral two times a day  lactobacillus acidophilus 1 Tablet(s) Oral every 12 hours  meropenem IVPB      meropenem IVPB 1000 milliGRAM(s) IV Intermittent every 8 hours  pantoprazole  Injectable 40 milliGRAM(s) IV Push every 12 hours  potassium chloride    Tablet ER 20 milliEquivalent(s) Oral daily  vancomycin  IVPB 500 milliGRAM(s) IV Intermittent every 12 hours    MEDICATIONS  (PRN):  acetaminophen   Tablet. 650 milliGRAM(s) Oral every 6 hours PRN Moderate Pain (4 - 6)  acetaminophen  Suppository 650 milliGRAM(s) Rectal every 6 hours PRN For Temp greater than 38 C (100.4 F)    Pertinent Labs: 01-30 Na152 mmol/L<H> Glu 86 mg/dL K+ 3.5 mmol/L Cr  0.98 mg/dL BUN 33 mg/dL<H> Phos n/a   Alb n/a   PAB n/a        CAPILLARY BLOOD GLUCOSE    Skin: suspected DTI on sacrum; no edema noted  BM + for fecal & urinary incontinence    Estimated Needs:   [X ] no change since previous assessment  [ ] recalculated:     Previous Nutrition Diagnosis:   [ ] Inadequate Energy Intake [ ]Inadequate Oral Intake [ ] Excessive Energy Intake   [ ] Underweight [ ] Increased Nutrient Needs [ ] Overweight/Obesity   [ ] Altered GI Function [ ] Unintended Weight Loss [ ] Food & Nutrition Related Knowledge Deficit  [X ] Severe Malnutrition in context of acute illness     Nutrition Diagnosis is [X ] ongoing  [ ] resolved [ ] not applicable     NFPE due at next follow-up on 2/3.     New nutrition diagnosis: [ X] not applicable       Interventions: continue current diet rx a noted above  Recommend  [ ] Change Diet To:  [ ] Nutrition Supplement  [X ] Nutrition Support: consider enteral feeding only as medically appropriate or per family request; recommend Jevity 1.2 to goal rate of 60 ml/hr (provides 1728 kcal, 80 g protein)  [ ] Other:     Monitoring and Evaluation:   [X ] PO intake [ x ] Tolerance to diet prescription [ x ] weights [ x ] labs[ x ] follow up per protocol  [ ] other:

## 2018-01-30 NOTE — PROGRESS NOTE ADULT - SUBJECTIVE AND OBJECTIVE BOX
Patient is a 89y old  Male who presents with a chief complaint of SOB, cough (13 Jan 2018 09:33)      INTERVAL HPI/OVERNIGHT EVENTS: sedated lethargic    MEDICATIONS  (STANDING):  acetylcysteine 10% Inhalation 3 milliLiter(s) Inhalation every 6 hours  ALBUTerol/ipratropium for Nebulization 3 milliLiter(s) Nebulizer every 6 hours  aspirin enteric coated 81 milliGRAM(s) Oral daily  ATENolol  Tablet 50 milliGRAM(s) Oral daily  atorvastatin 20 milliGRAM(s) Oral at bedtime  buDESOnide   0.5 milliGRAM(s) Respule 0.5 milliGRAM(s) Inhalation every 12 hours  dextrose 5% + sodium chloride 0.45%. 1000 milliLiter(s) (75 mL/Hr) IV Continuous <Continuous>  furosemide    Tablet 40 milliGRAM(s) Oral daily  guaiFENesin    Syrup 200 milliGRAM(s) Oral every 4 hours  haloperidol     Tablet 0.25 milliGRAM(s) Oral two times a day  lactobacillus acidophilus 1 Tablet(s) Oral every 12 hours  meropenem IVPB      meropenem IVPB 1000 milliGRAM(s) IV Intermittent every 8 hours  pantoprazole  Injectable 40 milliGRAM(s) IV Push every 12 hours  potassium chloride    Tablet ER 20 milliEquivalent(s) Oral daily  vancomycin  IVPB 500 milliGRAM(s) IV Intermittent every 12 hours    MEDICATIONS  (PRN):  acetaminophen   Tablet. 650 milliGRAM(s) Oral every 6 hours PRN Moderate Pain (4 - 6)  acetaminophen  Suppository 650 milliGRAM(s) Rectal every 6 hours PRN For Temp greater than 38 C (100.4 F)      Allergies    aspirin (Stomach Upset)    Intolerances        REVIEW OF SYSTEMS:            Vital Signs Last 24 Hrs  T(C): 35.6 (30 Jan 2018 17:28), Max: 37.3 (30 Jan 2018 05:23)  T(F): 96 (30 Jan 2018 17:28), Max: 99.2 (30 Jan 2018 05:23)  HR: 64 (30 Jan 2018 17:41) (64 - 103)  BP: 118/56 (30 Jan 2018 17:28) (118/56 - 146/67)  BP(mean): --  RR: 17 (30 Jan 2018 17:28) (16 - 18)  SpO2: 96% (30 Jan 2018 17:41) (95% - 99%)    PHYSICAL EXAM:  general       HEENT wnl  CHEST/LUNG: Clear to percussion bilaterally; No rales, rhonchi, wheezing, or rubs  HEART: Regular rate and rhythm; No murmurs, rubs, or gallops  ABDOMEN: Soft, Nontender, Nondistended; Bowel sounds present  EXTREMITIES:  2+ Peripheral Pulses, No clubbing, cyanosis, or edema  LYMPH: No lymphadenopathy noted  SKIN: No rashes or lesions  sedated, soft restraintes on hands  Avalos - clear urine    LABS:                        12.0   18.64 )-----------( 352      ( 30 Jan 2018 07:17 )             38.2     01-30    152<H>  |  111<H>  |  33<H>  ----------------------------<  86  3.5   |  32<H>  |  0.98    Ca    8.5      30 Jan 2018 07:17          CAPILLARY BLOOD GLUCOSE                    RADIOLOGY & ADDITIONAL TESTS:    Imaging Personally Reviewed:  [y ] YES  [ ] NO    Consultant(s) Notes Reviewed:  [y ] YES  [ ] NO    Care Discussed with Consultants/Other Providers [ ] YES  [ ] NO    PROBLEMS:  PNEUMONIA, SEPSIS  DIFFICULTY BREATHING  Gram negative sepsis  Pneumonia  Delirium due to another medical condition, acute, mixed level of activity      Care discussed with family,         [ v ]   yes  [  ]  No    imp:    stable[ ]    unstable[ v ]     improving [   ]       unchanged  [  ]                Plans:  Continue present plans  [ v ]

## 2018-01-30 NOTE — CHART NOTE - NSCHARTNOTEFT_GEN_A_CORE
Hospitalist Medicine PA    Patient with Avalos inserted since 1/17 for retention. Trial of void from this AM - patient passed, however PVR was not recorded. Bladder scan @ 1230 shows 246 mL in urine. Per RN patient voided around 6 AM. Will monitor for urine retention.    Consider urology consult.

## 2018-01-31 LAB
ANION GAP SERPL CALC-SCNC: 8 MMOL/L — SIGNIFICANT CHANGE UP (ref 5–17)
BUN SERPL-MCNC: 35 MG/DL — HIGH (ref 7–23)
CALCIUM SERPL-MCNC: 8.3 MG/DL — LOW (ref 8.5–10.1)
CHLORIDE SERPL-SCNC: 111 MMOL/L — HIGH (ref 96–108)
CO2 SERPL-SCNC: 33 MMOL/L — HIGH (ref 22–31)
CREAT SERPL-MCNC: 0.92 MG/DL — SIGNIFICANT CHANGE UP (ref 0.5–1.3)
GLUCOSE SERPL-MCNC: 132 MG/DL — HIGH (ref 70–99)
HCT VFR BLD CALC: 35.9 % — LOW (ref 39–50)
HGB BLD-MCNC: 11.2 G/DL — LOW (ref 13–17)
MCHC RBC-ENTMCNC: 29.6 PG — SIGNIFICANT CHANGE UP (ref 27–34)
MCHC RBC-ENTMCNC: 31.2 GM/DL — LOW (ref 32–36)
MCV RBC AUTO: 94.7 FL — SIGNIFICANT CHANGE UP (ref 80–100)
NRBC # BLD: 0 /100 WBCS — SIGNIFICANT CHANGE UP (ref 0–0)
PLATELET # BLD AUTO: 366 K/UL — SIGNIFICANT CHANGE UP (ref 150–400)
POTASSIUM SERPL-MCNC: 3.1 MMOL/L — LOW (ref 3.5–5.3)
POTASSIUM SERPL-SCNC: 3.1 MMOL/L — LOW (ref 3.5–5.3)
RBC # BLD: 3.79 M/UL — LOW (ref 4.2–5.8)
RBC # FLD: 13.2 % — SIGNIFICANT CHANGE UP (ref 10.3–14.5)
SODIUM SERPL-SCNC: 152 MMOL/L — HIGH (ref 135–145)
WBC # BLD: 20.85 K/UL — HIGH (ref 3.8–10.5)
WBC # FLD AUTO: 20.85 K/UL — HIGH (ref 3.8–10.5)

## 2018-01-31 RX ORDER — POTASSIUM CHLORIDE 20 MEQ
10 PACKET (EA) ORAL
Qty: 0 | Refills: 0 | Status: COMPLETED | OUTPATIENT
Start: 2018-01-31 | End: 2018-01-31

## 2018-01-31 RX ADMIN — Medication 3 MILLILITER(S): at 23:54

## 2018-01-31 RX ADMIN — Medication 3 MILLILITER(S): at 00:47

## 2018-01-31 RX ADMIN — HALOPERIDOL DECANOATE 0.25 MILLIGRAM(S): 100 INJECTION INTRAMUSCULAR at 17:25

## 2018-01-31 RX ADMIN — Medication 3 MILLILITER(S): at 11:10

## 2018-01-31 RX ADMIN — Medication 20 MILLIEQUIVALENT(S): at 11:14

## 2018-01-31 RX ADMIN — Medication 100 MILLIEQUIVALENT(S): at 11:13

## 2018-01-31 RX ADMIN — Medication 40 MILLIGRAM(S): at 05:19

## 2018-01-31 RX ADMIN — Medication 200 MILLIGRAM(S): at 21:46

## 2018-01-31 RX ADMIN — Medication 200 MILLIGRAM(S): at 17:25

## 2018-01-31 RX ADMIN — Medication 3 MILLILITER(S): at 17:18

## 2018-01-31 RX ADMIN — ATORVASTATIN CALCIUM 20 MILLIGRAM(S): 80 TABLET, FILM COATED ORAL at 21:46

## 2018-01-31 RX ADMIN — Medication 3 MILLILITER(S): at 05:27

## 2018-01-31 RX ADMIN — Medication 0.5 MILLIGRAM(S): at 05:28

## 2018-01-31 RX ADMIN — Medication 200 MILLIGRAM(S): at 14:15

## 2018-01-31 RX ADMIN — PANTOPRAZOLE SODIUM 40 MILLIGRAM(S): 20 TABLET, DELAYED RELEASE ORAL at 05:25

## 2018-01-31 RX ADMIN — HALOPERIDOL DECANOATE 0.25 MILLIGRAM(S): 100 INJECTION INTRAMUSCULAR at 05:19

## 2018-01-31 RX ADMIN — Medication 81 MILLIGRAM(S): at 11:15

## 2018-01-31 RX ADMIN — Medication 200 MILLIGRAM(S): at 05:20

## 2018-01-31 RX ADMIN — MEROPENEM 100 MILLIGRAM(S): 1 INJECTION INTRAVENOUS at 14:14

## 2018-01-31 RX ADMIN — Medication 3 MILLILITER(S): at 23:55

## 2018-01-31 RX ADMIN — Medication 100 MILLIGRAM(S): at 21:44

## 2018-01-31 RX ADMIN — Medication 100 MILLIEQUIVALENT(S): at 12:48

## 2018-01-31 RX ADMIN — Medication 1 TABLET(S): at 17:25

## 2018-01-31 RX ADMIN — MEROPENEM 100 MILLIGRAM(S): 1 INJECTION INTRAVENOUS at 05:21

## 2018-01-31 RX ADMIN — ATENOLOL 50 MILLIGRAM(S): 25 TABLET ORAL at 05:36

## 2018-01-31 RX ADMIN — Medication 100 MILLIGRAM(S): at 12:49

## 2018-01-31 RX ADMIN — Medication 3 MILLILITER(S): at 17:17

## 2018-01-31 RX ADMIN — MEROPENEM 100 MILLIGRAM(S): 1 INJECTION INTRAVENOUS at 21:44

## 2018-01-31 RX ADMIN — PANTOPRAZOLE SODIUM 40 MILLIGRAM(S): 20 TABLET, DELAYED RELEASE ORAL at 17:25

## 2018-01-31 RX ADMIN — Medication 1 TABLET(S): at 05:21

## 2018-01-31 RX ADMIN — Medication 0.5 MILLIGRAM(S): at 17:17

## 2018-02-01 LAB
ANION GAP SERPL CALC-SCNC: 5 MMOL/L — SIGNIFICANT CHANGE UP (ref 5–17)
BUN SERPL-MCNC: 30 MG/DL — HIGH (ref 7–23)
CALCIUM SERPL-MCNC: 8.2 MG/DL — LOW (ref 8.5–10.1)
CHLORIDE SERPL-SCNC: 113 MMOL/L — HIGH (ref 96–108)
CO2 SERPL-SCNC: 34 MMOL/L — HIGH (ref 22–31)
CREAT SERPL-MCNC: 0.93 MG/DL — SIGNIFICANT CHANGE UP (ref 0.5–1.3)
GLUCOSE SERPL-MCNC: 101 MG/DL — HIGH (ref 70–99)
HCT VFR BLD CALC: 36.1 % — LOW (ref 39–50)
HGB BLD-MCNC: 11.1 G/DL — LOW (ref 13–17)
MCHC RBC-ENTMCNC: 29.5 PG — SIGNIFICANT CHANGE UP (ref 27–34)
MCHC RBC-ENTMCNC: 30.7 GM/DL — LOW (ref 32–36)
MCV RBC AUTO: 96 FL — SIGNIFICANT CHANGE UP (ref 80–100)
NRBC # BLD: 0 /100 WBCS — SIGNIFICANT CHANGE UP (ref 0–0)
PLATELET # BLD AUTO: 319 K/UL — SIGNIFICANT CHANGE UP (ref 150–400)
POTASSIUM SERPL-MCNC: 3.2 MMOL/L — LOW (ref 3.5–5.3)
POTASSIUM SERPL-SCNC: 3.2 MMOL/L — LOW (ref 3.5–5.3)
RBC # BLD: 3.76 M/UL — LOW (ref 4.2–5.8)
RBC # FLD: 13.3 % — SIGNIFICANT CHANGE UP (ref 10.3–14.5)
SODIUM SERPL-SCNC: 152 MMOL/L — HIGH (ref 135–145)
WBC # BLD: 16.09 K/UL — HIGH (ref 3.8–10.5)
WBC # FLD AUTO: 16.09 K/UL — HIGH (ref 3.8–10.5)

## 2018-02-01 PROCEDURE — 71045 X-RAY EXAM CHEST 1 VIEW: CPT | Mod: 26

## 2018-02-01 RX ORDER — POTASSIUM CHLORIDE 20 MEQ
40 PACKET (EA) ORAL ONCE
Qty: 0 | Refills: 0 | Status: COMPLETED | OUTPATIENT
Start: 2018-02-01 | End: 2018-02-01

## 2018-02-01 RX ORDER — SODIUM CHLORIDE 9 MG/ML
1000 INJECTION, SOLUTION INTRAVENOUS
Qty: 0 | Refills: 0 | Status: DISCONTINUED | OUTPATIENT
Start: 2018-02-01 | End: 2018-02-04

## 2018-02-01 RX ADMIN — MEROPENEM 100 MILLIGRAM(S): 1 INJECTION INTRAVENOUS at 13:25

## 2018-02-01 RX ADMIN — SODIUM CHLORIDE 75 MILLILITER(S): 9 INJECTION, SOLUTION INTRAVENOUS at 17:08

## 2018-02-01 RX ADMIN — Medication 0.5 MILLIGRAM(S): at 05:46

## 2018-02-01 RX ADMIN — Medication 40 MILLIEQUIVALENT(S): at 09:29

## 2018-02-01 RX ADMIN — Medication 3 MILLILITER(S): at 11:41

## 2018-02-01 RX ADMIN — Medication 3 MILLILITER(S): at 23:46

## 2018-02-01 RX ADMIN — SODIUM CHLORIDE 75 MILLILITER(S): 9 INJECTION, SOLUTION INTRAVENOUS at 09:32

## 2018-02-01 RX ADMIN — HALOPERIDOL DECANOATE 0.25 MILLIGRAM(S): 100 INJECTION INTRAMUSCULAR at 05:25

## 2018-02-01 RX ADMIN — Medication 100 MILLIGRAM(S): at 22:11

## 2018-02-01 RX ADMIN — PANTOPRAZOLE SODIUM 40 MILLIGRAM(S): 20 TABLET, DELAYED RELEASE ORAL at 05:26

## 2018-02-01 RX ADMIN — PANTOPRAZOLE SODIUM 40 MILLIGRAM(S): 20 TABLET, DELAYED RELEASE ORAL at 17:08

## 2018-02-01 RX ADMIN — SODIUM CHLORIDE 75 MILLILITER(S): 9 INJECTION, SOLUTION INTRAVENOUS at 13:26

## 2018-02-01 RX ADMIN — Medication 0.5 MILLIGRAM(S): at 17:00

## 2018-02-01 RX ADMIN — Medication 40 MILLIGRAM(S): at 05:25

## 2018-02-01 RX ADMIN — ATENOLOL 50 MILLIGRAM(S): 25 TABLET ORAL at 05:25

## 2018-02-01 RX ADMIN — Medication 200 MILLIGRAM(S): at 05:25

## 2018-02-01 RX ADMIN — MEROPENEM 100 MILLIGRAM(S): 1 INJECTION INTRAVENOUS at 05:24

## 2018-02-01 RX ADMIN — Medication 1 TABLET(S): at 05:25

## 2018-02-01 RX ADMIN — Medication 3 MILLILITER(S): at 05:45

## 2018-02-01 RX ADMIN — SODIUM CHLORIDE 75 MILLILITER(S): 9 INJECTION, SOLUTION INTRAVENOUS at 11:24

## 2018-02-01 RX ADMIN — Medication 3 MILLILITER(S): at 17:00

## 2018-02-01 RX ADMIN — Medication 100 MILLIGRAM(S): at 09:42

## 2018-02-01 RX ADMIN — Medication 81 MILLIGRAM(S): at 12:21

## 2018-02-01 RX ADMIN — Medication 200 MILLIGRAM(S): at 09:29

## 2018-02-01 RX ADMIN — Medication 200 MILLIGRAM(S): at 22:11

## 2018-02-01 RX ADMIN — MEROPENEM 100 MILLIGRAM(S): 1 INJECTION INTRAVENOUS at 22:11

## 2018-02-01 RX ADMIN — Medication 20 MILLIEQUIVALENT(S): at 11:24

## 2018-02-02 LAB
ANION GAP SERPL CALC-SCNC: 8 MMOL/L — SIGNIFICANT CHANGE UP (ref 5–17)
BUN SERPL-MCNC: 25 MG/DL — HIGH (ref 7–23)
CALCIUM SERPL-MCNC: 8.1 MG/DL — LOW (ref 8.5–10.1)
CHLORIDE SERPL-SCNC: 110 MMOL/L — HIGH (ref 96–108)
CO2 SERPL-SCNC: 31 MMOL/L — SIGNIFICANT CHANGE UP (ref 22–31)
CREAT SERPL-MCNC: 0.89 MG/DL — SIGNIFICANT CHANGE UP (ref 0.5–1.3)
GLUCOSE SERPL-MCNC: 97 MG/DL — SIGNIFICANT CHANGE UP (ref 70–99)
HCT VFR BLD CALC: 35.5 % — LOW (ref 39–50)
HGB BLD-MCNC: 11.2 G/DL — LOW (ref 13–17)
MCHC RBC-ENTMCNC: 30 PG — SIGNIFICANT CHANGE UP (ref 27–34)
MCHC RBC-ENTMCNC: 31.5 GM/DL — LOW (ref 32–36)
MCV RBC AUTO: 95.2 FL — SIGNIFICANT CHANGE UP (ref 80–100)
NRBC # BLD: 0 /100 WBCS — SIGNIFICANT CHANGE UP (ref 0–0)
PLATELET # BLD AUTO: 321 K/UL — SIGNIFICANT CHANGE UP (ref 150–400)
POTASSIUM SERPL-MCNC: 3.4 MMOL/L — LOW (ref 3.5–5.3)
POTASSIUM SERPL-SCNC: 3.4 MMOL/L — LOW (ref 3.5–5.3)
RBC # BLD: 3.73 M/UL — LOW (ref 4.2–5.8)
RBC # FLD: 13.3 % — SIGNIFICANT CHANGE UP (ref 10.3–14.5)
SODIUM SERPL-SCNC: 149 MMOL/L — HIGH (ref 135–145)
WBC # BLD: 16.68 K/UL — HIGH (ref 3.8–10.5)
WBC # FLD AUTO: 16.68 K/UL — HIGH (ref 3.8–10.5)

## 2018-02-02 RX ORDER — POTASSIUM CHLORIDE 20 MEQ
40 PACKET (EA) ORAL ONCE
Qty: 0 | Refills: 0 | Status: COMPLETED | OUTPATIENT
Start: 2018-02-02 | End: 2018-02-02

## 2018-02-02 RX ORDER — POTASSIUM CHLORIDE 20 MEQ
10 PACKET (EA) ORAL
Qty: 0 | Refills: 0 | Status: COMPLETED | OUTPATIENT
Start: 2018-02-02 | End: 2018-02-02

## 2018-02-02 RX ADMIN — Medication 1 TABLET(S): at 05:17

## 2018-02-02 RX ADMIN — MEROPENEM 100 MILLIGRAM(S): 1 INJECTION INTRAVENOUS at 13:34

## 2018-02-02 RX ADMIN — Medication 40 MILLIGRAM(S): at 05:17

## 2018-02-02 RX ADMIN — PANTOPRAZOLE SODIUM 40 MILLIGRAM(S): 20 TABLET, DELAYED RELEASE ORAL at 17:03

## 2018-02-02 RX ADMIN — Medication 3 MILLILITER(S): at 11:22

## 2018-02-02 RX ADMIN — Medication 3 MILLILITER(S): at 05:20

## 2018-02-02 RX ADMIN — Medication 100 MILLIGRAM(S): at 10:19

## 2018-02-02 RX ADMIN — Medication 200 MILLIGRAM(S): at 05:18

## 2018-02-02 RX ADMIN — HALOPERIDOL DECANOATE 0.25 MILLIGRAM(S): 100 INJECTION INTRAMUSCULAR at 17:03

## 2018-02-02 RX ADMIN — Medication 3 MILLILITER(S): at 17:18

## 2018-02-02 RX ADMIN — MEROPENEM 100 MILLIGRAM(S): 1 INJECTION INTRAVENOUS at 22:22

## 2018-02-02 RX ADMIN — ATENOLOL 50 MILLIGRAM(S): 25 TABLET ORAL at 05:17

## 2018-02-02 RX ADMIN — MEROPENEM 100 MILLIGRAM(S): 1 INJECTION INTRAVENOUS at 05:12

## 2018-02-02 RX ADMIN — HALOPERIDOL DECANOATE 0.25 MILLIGRAM(S): 100 INJECTION INTRAMUSCULAR at 05:18

## 2018-02-02 RX ADMIN — Medication 200 MILLIGRAM(S): at 22:22

## 2018-02-02 RX ADMIN — Medication 100 MILLIEQUIVALENT(S): at 10:19

## 2018-02-02 RX ADMIN — Medication 0.5 MILLIGRAM(S): at 05:20

## 2018-02-02 RX ADMIN — Medication 1 TABLET(S): at 17:03

## 2018-02-02 RX ADMIN — Medication 0.5 MILLIGRAM(S): at 17:43

## 2018-02-02 RX ADMIN — Medication 100 MILLIGRAM(S): at 23:39

## 2018-02-02 RX ADMIN — ATORVASTATIN CALCIUM 20 MILLIGRAM(S): 80 TABLET, FILM COATED ORAL at 22:22

## 2018-02-02 RX ADMIN — Medication 100 MILLIEQUIVALENT(S): at 11:06

## 2018-02-02 RX ADMIN — Medication 81 MILLIGRAM(S): at 11:06

## 2018-02-02 RX ADMIN — Medication 200 MILLIGRAM(S): at 02:49

## 2018-02-02 NOTE — CHART NOTE - NSCHARTNOTEFT_GEN_A_CORE
Assessment:     89 y.o. male recent discharge (12/27) c PNA; sent to rehab; now admitted from [facility] c SOB, + flu.  Pt lethargic, confused, minimally arousable & verbal.     Factors impacting intake: [ ] none [ ] nausea  [ ] vomiting [ ] diarrhea [ ] constipation  [ ]chewing problems [ ] swallowing issues  [X ] other: AMS, lack of appetite    Diet Prescription: Diet, Dysphagia 2 Mechanical Soft-Nectar Consistency Fluid:   Supplement Feeding Modality:  Oral  Ensure Pudding Cans or Servings Per Day:  1       Frequency:  Three Times a day (01-22-18 @ 15:52)    Intake:  total feed; PO intake remains poor; 0-50% most meals; pt refusing to eat at times    Current Weight: 72.5 kg (2/1); previous wt 69.2 kg (1/26)  % Weight Change: gain of 5% x 6 days - ? accuracy since pt isn't eating well    Pertinent Medications: MEDICATIONS  (STANDING):  acetylcysteine 10% Inhalation 3 milliLiter(s) Inhalation every 6 hours  ALBUTerol/ipratropium for Nebulization 3 milliLiter(s) Nebulizer every 6 hours  aspirin enteric coated 81 milliGRAM(s) Oral daily  ATENolol  Tablet 50 milliGRAM(s) Oral daily  atorvastatin 20 milliGRAM(s) Oral at bedtime  buDESOnide   0.5 milliGRAM(s) Respule 0.5 milliGRAM(s) Inhalation every 12 hours  dextrose 5%. 1000 milliLiter(s) (75 mL/Hr) IV Continuous <Continuous>  furosemide    Tablet 40 milliGRAM(s) Oral daily  guaiFENesin    Syrup 200 milliGRAM(s) Oral every 4 hours  haloperidol     Tablet 0.25 milliGRAM(s) Oral two times a day  lactobacillus acidophilus 1 Tablet(s) Oral every 12 hours  meropenem IVPB      meropenem IVPB 1000 milliGRAM(s) IV Intermittent every 8 hours  pantoprazole  Injectable 40 milliGRAM(s) IV Push every 12 hours  potassium chloride    Tablet ER 20 milliEquivalent(s) Oral daily  vancomycin  IVPB 500 milliGRAM(s) IV Intermittent every 12 hours    MEDICATIONS  (PRN):  acetaminophen   Tablet. 650 milliGRAM(s) Oral every 6 hours PRN Moderate Pain (4 - 6)  acetaminophen  Suppository 650 milliGRAM(s) Rectal every 6 hours PRN For Temp greater than 38 C (100.4 F)    Pertinent Labs: 02-02 Na149 mmol/L<H> Glu 97 mg/dL K+ 3.4 mmol/L<L> Cr  0.89 mg/dL BUN 25 mg/dL<H> Phos n/a   Alb n/a   PAB n/a        CAPILLARY BLOOD GLUCOSE    Skin: Suspected DTI on sacrum; no edema noted  BM + for fecal & urinary incontinence    Estimated Needs:   [X ] no change since previous assessment  [ ] recalculated:     Previous Nutrition Diagnosis:   [ ] Inadequate Energy Intake [ ]Inadequate Oral Intake [ ] Excessive Energy Intake   [ ] Underweight [ ] Increased Nutrient Needs [ ] Overweight/Obesity   [ ] Altered GI Function [ ] Unintended Weight Loss [ ] Food & Nutrition Related Knowledge Deficit  [X ] Severe Malnutrition in context of acute illness     Nutrition Diagnosis is [ ] ongoing  [ ] resolved [ ] not applicable     New Nutrition Diagnosis: [x ] not applicable       Interventions:   Recommend  [ ] Change Diet To:  [ ] Nutrition Supplement  [ ] Nutrition Support  [ ] Other:     Monitoring and Evaluation:   [ ] PO intake [ x ] Tolerance to diet prescription [ x ] weights [ x ] labs[ x ] follow up per protocol  [ ] other: Assessment:     89 y.o. male recent discharge (12/27) c PNA; sent to rehab; now admitted from [facility] c SOB, + flu.  Pt lethargic, confused, minimally arousable & verbal.     Factors impacting intake: [ ] none [ ] nausea  [ ] vomiting [ ] diarrhea [ ] constipation  [ ]chewing problems [ ] swallowing issues  [X ] other: AMS, lack of appetite    Diet Prescription: Diet, Dysphagia 2 Mechanical Soft-Nectar Consistency Fluid:   Supplement Feeding Modality:  Oral  Ensure Pudding Cans or Servings Per Day:  1       Frequency:  Three Times a day (01-22-18 @ 15:52)    Intake:  total feed; PO intake remains poor; 0-50% most meals; pt refusing to eat at times    Current Weight: 72.5 kg (2/1); previous wt 69.2 kg (1/26)  % Weight Change: gain of 5% x 6 days - ? accuracy since pt isn't eating well    Pertinent Medications: MEDICATIONS  (STANDING):  acetylcysteine 10% Inhalation 3 milliLiter(s) Inhalation every 6 hours  ALBUTerol/ipratropium for Nebulization 3 milliLiter(s) Nebulizer every 6 hours  aspirin enteric coated 81 milliGRAM(s) Oral daily  ATENolol  Tablet 50 milliGRAM(s) Oral daily  atorvastatin 20 milliGRAM(s) Oral at bedtime  buDESOnide   0.5 milliGRAM(s) Respule 0.5 milliGRAM(s) Inhalation every 12 hours  dextrose 5%. 1000 milliLiter(s) (75 mL/Hr) IV Continuous <Continuous>  furosemide    Tablet 40 milliGRAM(s) Oral daily  guaiFENesin    Syrup 200 milliGRAM(s) Oral every 4 hours  haloperidol     Tablet 0.25 milliGRAM(s) Oral two times a day  lactobacillus acidophilus 1 Tablet(s) Oral every 12 hours  meropenem IVPB      meropenem IVPB 1000 milliGRAM(s) IV Intermittent every 8 hours  pantoprazole  Injectable 40 milliGRAM(s) IV Push every 12 hours  potassium chloride    Tablet ER 20 milliEquivalent(s) Oral daily  vancomycin  IVPB 500 milliGRAM(s) IV Intermittent every 12 hours    MEDICATIONS  (PRN):  acetaminophen   Tablet. 650 milliGRAM(s) Oral every 6 hours PRN Moderate Pain (4 - 6)  acetaminophen  Suppository 650 milliGRAM(s) Rectal every 6 hours PRN For Temp greater than 38 C (100.4 F)    Pertinent Labs: 02-02 Na149 mmol/L<H> Glu 97 mg/dL K+ 3.4 mmol/L<L> Cr  0.89 mg/dL BUN 25 mg/dL<H> Phos n/a   Alb n/a   PAB n/a        CAPILLARY BLOOD GLUCOSE    Skin: Suspected DTI on sacrum; no edema noted  BM + for fecal & urinary incontinence    Estimated Needs:   [X ] no change since previous assessment  [ ] recalculated:     Previous Nutrition Diagnosis:   [ ] Inadequate Energy Intake [ ]Inadequate Oral Intake [ ] Excessive Energy Intake   [ ] Underweight [ ] Increased Nutrient Needs [ ] Overweight/Obesity   [ ] Altered GI Function [ ] Unintended Weight Loss [ ] Food & Nutrition Related Knowledge Deficit  [X ] Severe Malnutrition in context of acute illness     Nutrition focused physical exam conducted:    Subcutaneous fat loss: [severe]Orbital fat pads region, [unable ]Buccal fat region, [unable ]Triceps region,  [moderate ]Ribs region.    Muscle wasting: [severe ]Temples region, [severe ]Clavicle region, [severe ]Shoulder region, [unable ]Scapula region, [unable - edematous ]Interosseous region,  [moderate ]thigh region,   [unable ]Calf region    Nutrition Diagnosis is [X ] ongoing  [ ] resolved [ ] not applicable     New Nutrition Diagnosis: [x ] not applicable       Interventions: continue current diet rx as noted  Recommend  [ ] Change Diet To:  [X ] Nutrition Supplement  [ ] Nutrition Support: consider enteral feeding only as medically appropriate or per family request; recommend Jevity 1.2 to goal reate of 60 ml/hr (provides 1728 kcal, 80 g protein)  [ ] Other:     Monitoring and Evaluation:   [X ] PO intake [ x ] Tolerance to diet prescription [ x ] weights [ x ] labs[ x ] follow up per protocol  [ ] other:

## 2018-02-02 NOTE — PROGRESS NOTE ADULT - SUBJECTIVE AND OBJECTIVE BOX
Patient is a 89y old  Male who presents with a chief complaint of SOB, cough (13 Jan 2018 09:33)      INTERVAL HPI/OVERNIGHT EVENTS: AA verbally responsive. no distress  pt efuses food  Spits out medications    MEDICATIONS  (STANDING):  acetylcysteine 10% Inhalation 3 milliLiter(s) Inhalation every 6 hours  ALBUTerol/ipratropium for Nebulization 3 milliLiter(s) Nebulizer every 6 hours  aspirin enteric coated 81 milliGRAM(s) Oral daily  ATENolol  Tablet 50 milliGRAM(s) Oral daily  atorvastatin 20 milliGRAM(s) Oral at bedtime  buDESOnide   0.5 milliGRAM(s) Respule 0.5 milliGRAM(s) Inhalation every 12 hours  dextrose 5%. 1000 milliLiter(s) (75 mL/Hr) IV Continuous <Continuous>  furosemide    Tablet 40 milliGRAM(s) Oral daily  guaiFENesin    Syrup 200 milliGRAM(s) Oral every 4 hours  haloperidol     Tablet 0.25 milliGRAM(s) Oral two times a day  lactobacillus acidophilus 1 Tablet(s) Oral every 12 hours  meropenem IVPB      meropenem IVPB 1000 milliGRAM(s) IV Intermittent every 8 hours  pantoprazole  Injectable 40 milliGRAM(s) IV Push every 12 hours  potassium chloride    Tablet ER 20 milliEquivalent(s) Oral daily  vancomycin  IVPB 500 milliGRAM(s) IV Intermittent every 12 hours    MEDICATIONS  (PRN):  acetaminophen   Tablet. 650 milliGRAM(s) Oral every 6 hours PRN Moderate Pain (4 - 6)  acetaminophen  Suppository 650 milliGRAM(s) Rectal every 6 hours PRN For Temp greater than 38 C (100.4 F)      Allergies    aspirin (Stomach Upset)    Intolerances        REVIEW OF SYSTEMS:      confused ROS not reliable      Vital Signs Last 24 Hrs  T(C): 36.4 (02 Feb 2018 17:36), Max: 36.7 (02 Feb 2018 11:40)  T(F): 97.5 (02 Feb 2018 17:36), Max: 98 (02 Feb 2018 11:40)  HR: 70 (02 Feb 2018 17:44) (61 - 81)  BP: 111/50 (02 Feb 2018 17:36) (111/50 - 133/69)  BP(mean): --  RR: 17 (02 Feb 2018 17:36) (17 - 20)  SpO2: 99% (02 Feb 2018 17:44) (94% - 100%)    PHYSICAL EXAM:  general       HEENT wnl  CHEST/LUNG: Clear to percussion bilaterally; No rales, rhonchi, wheezing, or rubs  HEART: Regular rate and rhythm; No murmurs, rubs, or gallops  ABDOMEN: Soft, Nontender, Nondistended; Bowel sounds present  EXTREMITIES:  2+ Peripheral Pulses, No clubbing, cyanosis, or edema  LYMPH: No lymphadenopathy noted  SKIN: No rashes or lesions    LABS:                        11.2   16.68 )-----------( 321      ( 02 Feb 2018 06:31 )             35.5     02-02    149<H>  |  110<H>  |  25<H>  ----------------------------<  97  3.4<L>   |  31  |  0.89    Ca    8.1<L>      02 Feb 2018 06:31          CAPILLARY BLOOD GLUCOSE                    RADIOLOGY & ADDITIONAL TESTS:    Imaging Personally Reviewed:  [y ] YES  [ ] NO    Consultant(s) Notes Reviewed:  [y ] YES  [ ] NO    Care Discussed with Consultants/Other Providers [ ] YES  [ ] NO    PROBLEMS:  PNEUMONIA, SEPSIS  DIFFICULTY BREATHING  Gram negative sepsis  Pneumonia  Delirium due to another medical condition, acute, mixed level of activity      Care discussed with family,         [ v ]   yes  [  ]  No    imp:    stable[ ]    unstable[  ]     improving [   ]       unchanged  [  ]      some improvement but stopped eating          Plans:  calorie count

## 2018-02-02 NOTE — PROGRESS NOTE ADULT - SUBJECTIVE AND OBJECTIVE BOX
TMAX - 98.4    On day # 22 Meropenem / # 22 Vanco now    Vital Signs Last 24 Hrs  T(C): 36.7 (02 Feb 2018 11:40), Max: 36.7 (02 Feb 2018 11:40)  T(F): 98 (02 Feb 2018 11:40), Max: 98 (02 Feb 2018 11:40)  HR: 61 (02 Feb 2018 11:40) (61 - 81)  BP: 129/60 (02 Feb 2018 11:40) (115/62 - 133/69)  BP(mean): --  RR: 18 (02 Feb 2018 11:40) (18 - 20)  SpO2: 96% (02 Feb 2018 11:40) (94% - 98%)  Supplemental O2:  on NC O2 now    Lethargic but arousable and with no c/o SOB, cp, or cough at present.    PHYSICAL EXAM  General:  lethargic but arousable today, lying in bed, with NC O2 in place, appears comfortable  HEENT:  conj pink, sclerae anicteric, PERRLA, oral mucosa very dry, no lesions noted, edentulous   Neck:  semi-supple, no nodes noted  Heart:  RR  Lungs: decreased BS at bases bilaterally, clear anteriorly   Abdomen:  soft, BS +, non- tender to palpation  Extremities:  no edema LE's                     no calf tenderness noted  Skin: warm, dry, no rash noted      I&O's Summary :    01 Feb 2018 07:01  -  02 Feb 2018 07:00  --------------------------------------------------------  IN: 615 mL / OUT: 0 mL / NET: 615 mL      LABS:  CBC Full  -  ( 02 Feb 2018 06:31 )  WBC Count : 16.68 K/uL  Hemoglobin : 11.2 g/dL  Hematocrit : 35.5 %  Platelet Count - Automated : 321 K/uL  Mean Cell Volume : 95.2 fl  Mean Cell Hemoglobin : 30.0 pg  Mean Cell Hemoglobin Concentration : 31.5 gm/dL  Auto Neutrophil # : x  Auto Lymphocyte # : x  Auto Monocyte # : x  Auto Eosinophil # : x  Auto Basophil # : x  Auto Neutrophil % : x  Auto Lymphocyte % : x  Auto Monocyte % : x  Auto Eosinophil % : x  Auto Basophil % : x    02-02    149<H>  |  110<H>  |  25<H>  ----------------------------<  97  3.4<L>   |  31  |  0.89    Ca    8.1<L>      02 Feb 2018 06:31          Radiology:   CXR - 2/1 -  < from: Xray Chest 1 View-PORTABLE IMMEDIATE (02.01.18 @ 18:26) >  Comparison: 1/26/2018.    Patient is rotated towards the left.    The heart is not enlarged. Calcified aorta. Small bilateral pleural   effusions. Mild pulmonary vascular congestion and mild left mid and   bibasilar hazy lung opacity. Small streaky density lateral right mid to   lower lung field again seen.     Degenerative changes of the spine and shoulders.    Impression:    No significant interval change small bilateral pleural effusions and mild   hazy lung opacities, possible infiltrates.        Impression:  Clinically improving slowly on present ab rx for Sepsis secondary to Multifocal PNA likely as a consequence of Influenza A infection but noted still with WBC's elevated at 16.68 today and CXR reported with no significant changes.      Suggestions:  Will consider d/c ab rx tomorrow if WBC's do not increase and there is no recurrence of fever.  If WBC's then remain elevated, would consider Hematology evaluation.

## 2018-02-03 LAB
ANION GAP SERPL CALC-SCNC: 5 MMOL/L — SIGNIFICANT CHANGE UP (ref 5–17)
ANISOCYTOSIS BLD QL: SLIGHT — SIGNIFICANT CHANGE UP
BASOPHILS # BLD AUTO: 0 K/UL — SIGNIFICANT CHANGE UP (ref 0–0.2)
BASOPHILS NFR BLD AUTO: 0 % — SIGNIFICANT CHANGE UP (ref 0–2)
BUN SERPL-MCNC: 22 MG/DL — SIGNIFICANT CHANGE UP (ref 7–23)
CALCIUM SERPL-MCNC: 8 MG/DL — LOW (ref 8.5–10.1)
CHLORIDE SERPL-SCNC: 106 MMOL/L — SIGNIFICANT CHANGE UP (ref 96–108)
CO2 SERPL-SCNC: 31 MMOL/L — SIGNIFICANT CHANGE UP (ref 22–31)
CREAT SERPL-MCNC: 0.88 MG/DL — SIGNIFICANT CHANGE UP (ref 0.5–1.3)
EOSINOPHIL # BLD AUTO: 0.47 K/UL — SIGNIFICANT CHANGE UP (ref 0–0.5)
EOSINOPHIL NFR BLD AUTO: 3 % — SIGNIFICANT CHANGE UP (ref 0–6)
GLUCOSE SERPL-MCNC: 112 MG/DL — HIGH (ref 70–99)
HCT VFR BLD CALC: 34.2 % — LOW (ref 39–50)
HGB BLD-MCNC: 11 G/DL — LOW (ref 13–17)
LYMPHOCYTES # BLD AUTO: 1.71 K/UL — SIGNIFICANT CHANGE UP (ref 1–3.3)
LYMPHOCYTES # BLD AUTO: 11 % — LOW (ref 13–44)
MACROCYTES BLD QL: SLIGHT — SIGNIFICANT CHANGE UP
MCHC RBC-ENTMCNC: 30 PG — SIGNIFICANT CHANGE UP (ref 27–34)
MCHC RBC-ENTMCNC: 32.2 GM/DL — SIGNIFICANT CHANGE UP (ref 32–36)
MCV RBC AUTO: 93.2 FL — SIGNIFICANT CHANGE UP (ref 80–100)
MONOCYTES # BLD AUTO: 1.09 K/UL — HIGH (ref 0–0.9)
MONOCYTES NFR BLD AUTO: 7 % — SIGNIFICANT CHANGE UP (ref 2–14)
NEUTROPHILS # BLD AUTO: 11.78 K/UL — HIGH (ref 1.8–7.4)
NEUTROPHILS NFR BLD AUTO: 70 % — SIGNIFICANT CHANGE UP (ref 43–77)
NEUTS BAND # BLD: 6 — SIGNIFICANT CHANGE UP
NRBC # BLD: 0 — SIGNIFICANT CHANGE UP
NRBC # BLD: SIGNIFICANT CHANGE UP /100 WBCS (ref 0–0)
PLAT MORPH BLD: NORMAL — SIGNIFICANT CHANGE UP
PLATELET # BLD AUTO: 301 K/UL — SIGNIFICANT CHANGE UP (ref 150–400)
POLYCHROMASIA BLD QL SMEAR: SLIGHT — SIGNIFICANT CHANGE UP
POTASSIUM SERPL-MCNC: 3.4 MMOL/L — LOW (ref 3.5–5.3)
POTASSIUM SERPL-SCNC: 3.4 MMOL/L — LOW (ref 3.5–5.3)
RBC # BLD: 3.67 M/UL — LOW (ref 4.2–5.8)
RBC # FLD: 13.2 % — SIGNIFICANT CHANGE UP (ref 10.3–14.5)
RBC BLD AUTO: ABNORMAL
SODIUM SERPL-SCNC: 142 MMOL/L — SIGNIFICANT CHANGE UP (ref 135–145)
TOXIC GRANULES BLD QL SMEAR: PRESENT — SIGNIFICANT CHANGE UP
VANCOMYCIN TROUGH SERPL-MCNC: 24 UG/ML — HIGH (ref 10–20)
VARIANT LYMPHS # BLD: 3 % — SIGNIFICANT CHANGE UP (ref 0–6)
WBC # BLD: 15.5 K/UL — HIGH (ref 3.8–10.5)
WBC # FLD AUTO: 15.5 K/UL — HIGH (ref 3.8–10.5)

## 2018-02-03 RX ADMIN — Medication 200 MILLIGRAM(S): at 13:11

## 2018-02-03 RX ADMIN — Medication 40 MILLIGRAM(S): at 05:24

## 2018-02-03 RX ADMIN — Medication 0.5 MILLIGRAM(S): at 17:10

## 2018-02-03 RX ADMIN — Medication 3 MILLILITER(S): at 06:11

## 2018-02-03 RX ADMIN — MEROPENEM 100 MILLIGRAM(S): 1 INJECTION INTRAVENOUS at 05:23

## 2018-02-03 RX ADMIN — Medication 3 MILLILITER(S): at 00:30

## 2018-02-03 RX ADMIN — Medication 3 MILLILITER(S): at 11:07

## 2018-02-03 RX ADMIN — MEROPENEM 100 MILLIGRAM(S): 1 INJECTION INTRAVENOUS at 22:27

## 2018-02-03 RX ADMIN — Medication 3 MILLILITER(S): at 17:10

## 2018-02-03 RX ADMIN — Medication 1 TABLET(S): at 05:23

## 2018-02-03 RX ADMIN — PANTOPRAZOLE SODIUM 40 MILLIGRAM(S): 20 TABLET, DELAYED RELEASE ORAL at 17:17

## 2018-02-03 RX ADMIN — PANTOPRAZOLE SODIUM 40 MILLIGRAM(S): 20 TABLET, DELAYED RELEASE ORAL at 05:24

## 2018-02-03 RX ADMIN — Medication 3 MILLILITER(S): at 00:31

## 2018-02-03 RX ADMIN — MEROPENEM 100 MILLIGRAM(S): 1 INJECTION INTRAVENOUS at 13:10

## 2018-02-03 RX ADMIN — Medication 200 MILLIGRAM(S): at 22:27

## 2018-02-03 RX ADMIN — Medication 20 MILLIEQUIVALENT(S): at 11:14

## 2018-02-03 RX ADMIN — ATORVASTATIN CALCIUM 20 MILLIGRAM(S): 80 TABLET, FILM COATED ORAL at 22:27

## 2018-02-03 RX ADMIN — Medication 0.5 MILLIGRAM(S): at 06:12

## 2018-02-03 RX ADMIN — Medication 200 MILLIGRAM(S): at 11:15

## 2018-02-03 RX ADMIN — Medication 200 MILLIGRAM(S): at 17:17

## 2018-02-03 RX ADMIN — HALOPERIDOL DECANOATE 0.25 MILLIGRAM(S): 100 INJECTION INTRAMUSCULAR at 17:17

## 2018-02-03 RX ADMIN — Medication 81 MILLIGRAM(S): at 11:16

## 2018-02-03 RX ADMIN — Medication 3 MILLILITER(S): at 11:08

## 2018-02-03 RX ADMIN — Medication 200 MILLIGRAM(S): at 05:24

## 2018-02-03 RX ADMIN — Medication 3 MILLILITER(S): at 17:09

## 2018-02-03 RX ADMIN — ATENOLOL 50 MILLIGRAM(S): 25 TABLET ORAL at 05:23

## 2018-02-03 RX ADMIN — Medication 1 TABLET(S): at 17:17

## 2018-02-03 RX ADMIN — HALOPERIDOL DECANOATE 0.25 MILLIGRAM(S): 100 INJECTION INTRAMUSCULAR at 05:24

## 2018-02-03 NOTE — PROGRESS NOTE ADULT - SUBJECTIVE AND OBJECTIVE BOX
Patient is a 89y old  Male who presents with a chief complaint of SOB, cough (13 Jan 2018 09:33)      INTERVAL HPI/OVERNIGHT EVENTS: aa confused    MEDICATIONS  (STANDING):  acetylcysteine 10% Inhalation 3 milliLiter(s) Inhalation every 6 hours  ALBUTerol/ipratropium for Nebulization 3 milliLiter(s) Nebulizer every 6 hours  aspirin enteric coated 81 milliGRAM(s) Oral daily  ATENolol  Tablet 50 milliGRAM(s) Oral daily  atorvastatin 20 milliGRAM(s) Oral at bedtime  buDESOnide   0.5 milliGRAM(s) Respule 0.5 milliGRAM(s) Inhalation every 12 hours  dextrose 5%. 1000 milliLiter(s) (75 mL/Hr) IV Continuous <Continuous>  furosemide    Tablet 40 milliGRAM(s) Oral daily  guaiFENesin    Syrup 200 milliGRAM(s) Oral every 4 hours  haloperidol     Tablet 0.25 milliGRAM(s) Oral two times a day  lactobacillus acidophilus 1 Tablet(s) Oral every 12 hours  meropenem IVPB      meropenem IVPB 1000 milliGRAM(s) IV Intermittent every 8 hours  pantoprazole  Injectable 40 milliGRAM(s) IV Push every 12 hours  potassium chloride    Tablet ER 20 milliEquivalent(s) Oral daily  vancomycin  IVPB 500 milliGRAM(s) IV Intermittent every 12 hours    MEDICATIONS  (PRN):  acetaminophen   Tablet. 650 milliGRAM(s) Oral every 6 hours PRN Moderate Pain (4 - 6)  acetaminophen  Suppository 650 milliGRAM(s) Rectal every 6 hours PRN For Temp greater than 38 C (100.4 F)      Allergies    aspirin (Stomach Upset)    Intolerances        REVIEW OF SYSTEMS:        unreliable        Vital Signs Last 24 Hrs  T(C): 36.6 (03 Feb 2018 05:49), Max: 36.7 (02 Feb 2018 11:40)  T(F): 97.9 (03 Feb 2018 05:49), Max: 98 (02 Feb 2018 11:40)  HR: 74 (03 Feb 2018 06:39) (61 - 98)  BP: 131/65 (03 Feb 2018 05:49) (111/50 - 156/68)  BP(mean): --  RR: 17 (03 Feb 2018 05:49) (17 - 18)  SpO2: 100% (03 Feb 2018 06:39) (95% - 100%)    PHYSICAL EXAM:  general       HEENT wnl  CHEST/LUNG: Clear to percussion bilaterally; No rales, rhonchi, wheezing, or rubs  HEART: Regular rate and rhythm; No murmurs, rubs, or gallops  ABDOMEN: distended, large bladder palpated  EXTREMITIES:  2+ Peripheral Pulses, No clubbing, cyanosis, or edema  LYMPH: No lymphadenopathy noted  SKIN: No rashes or lesions    LABS:                        11.0   15.50 )-----------( 301      ( 03 Feb 2018 06:00 )             34.2     02-03    142  |  106  |  22  ----------------------------<  112<H>  3.4<L>   |  31  |  0.88    Ca    8.0<L>      03 Feb 2018 06:00          CAPILLARY BLOOD GLUCOSE                    RADIOLOGY & ADDITIONAL TESTS:    Imaging Personally Reviewed:  [y ] YES  [ ] NO    Consultant(s) Notes Reviewed:  [y ] YES  [ ] NO    Care Discussed with Consultants/Other Providers [ ] YES  [ ] NO    PROBLEMS:  PNEUMONIA, SEPSIS  DIFFICULTY BREATHING  Gram negative sepsis  Pneumonia  Delirium due to another medical condition, acute, mixed level of activity  Urinary retention      Care discussed with family,         [ v]   yes  [  ]  No    imp:    stable[ ]    unstable[  ]     improving [   ]       unchanged  [  ]                Plans:  bladder scan, Avalos catheter

## 2018-02-03 NOTE — PROGRESS NOTE ADULT - SUBJECTIVE AND OBJECTIVE BOX
TMAX - 98    On day # 23 Meropenem / # 23 Vanco now    Vital Signs Last 24 Hrs  T(C): 36.1 (03 Feb 2018 13:54), Max: 36.6 (03 Feb 2018 05:49)  T(F): 97 (03 Feb 2018 13:54), Max: 97.9 (03 Feb 2018 05:49)  HR: 56 (03 Feb 2018 17:13) (55 - 98)  BP: 127/57 (03 Feb 2018 13:54) (127/57 - 156/68)  BP(mean): --  RR: 16 (03 Feb 2018 13:54) (16 - 17)  SpO2: 92% (03 Feb 2018 17:13) (92% - 100%)  Supplemental O2:  on NC O2 now      Awake, alert, claims to be feeling much better today - told me he slept for 3hrs. earlier today and is currently finishing his dinner.  Requesting his coffee with milk and sugar to be added.  Denies any cp, no c/o SOB, and cough seems to be decreasing.  Appetite is improving.        PHYSICAL EXAM  General: awake, alert, sitting up in bed, wearing his NCO2 , pleasant and cooperative, [place], in NAD now   HEENT:  conj pink, sclerae anicteric, PERRLA, no oral lesions noted, edentulous now ( dentures have been removed )  Neck:  semi-supple, no nodes noted  Heart:  RR  Lungs: decreased BS at bases bilaterally, with minimal rhonchi upper airway which clear with coughing, no wheezing noted   Abdomen:  soft, BS +, nontender to palpation  No CVA or Spinal tenderness elicited  Extremities:  no edema LE's   Skin: warm, dry, no rash noted       I&O's Summary :    02 Feb 2018 07:01  -  03 Feb 2018 07:00  --------------------------------------------------------  IN: 1100 mL / OUT: 0 mL / NET: 1100 mL      LABS:  CBC Full  -  ( 03 Feb 2018 06:00 )  WBC Count : 15.50 K/uL  Hemoglobin : 11.0 g/dL  Hematocrit : 34.2 %  Platelet Count - Automated : 301 K/uL  Mean Cell Volume : 93.2 fl  Mean Cell Hemoglobin : 30.0 pg  Mean Cell Hemoglobin Concentration : 32.2 gm/dL  Auto Neutrophil # : 11.78 K/uL  Auto Lymphocyte # : 1.71 K/uL  Auto Monocyte # : 1.09 K/uL  Auto Eosinophil # : 0.47 K/uL  Auto Basophil # : 0.00 K/uL  Auto Neutrophil % : 70.0 %  Auto Lymphocyte % : 11.0 %  Auto Monocyte % : 7.0 %  Auto Eosinophil % : 3.0 %  Auto Basophil % : 0.0 %    02-03    142  |  106  |  22  ----------------------------<  112<H>  3.4<L>   |  31  |  0.88    Ca    8.0<L>      03 Feb 2018 06:00      Vancomycin Level, Trough: 24.0 ug/mL (02-03 @ 10:22)            Radiology:  CXR - < from: Xray Chest 1 View-PORTABLE IMMEDIATE (02.01.18 @ 18:26) >    Comparison: 1/26/2018.    Patient is rotated towards the left.    The heart is not enlarged. Calcified aorta. Small bilateral pleural   effusions. Mild pulmonary vascular congestion and mild left mid and   bibasilar hazy lung opacity. Small streaky density lateral right mid to   lower lung field again seen.     Degenerative changes of the spine and shoulders.    Impression:    No significant interval change small bilateral pleural effusions and mild   hazy lung opacities, possible infiltrates.    < end of copied text >  < from: Xray Chest 1 View-PORTABLE IMMEDIATE (02.01.18 @ 18:26) >    Comparison: 1/26/2018.    Patient is rotated towards the left.    The heart is not enlarged. Calcified aorta. Small bilateral pleural   effusions. Mild pulmonary vascular congestion and mild left mid and   bibasilar hazy lung opacity. Small streaky density lateral right mid to   lower lung field again seen.     Degenerative changes of the spine and shoulders.    Impression:    No significant interval change small bilateral pleural effusions and mild   hazy lung opacities, possible infiltrates.        Impression:  Remains afebrile and clinically improved today on present ab rx with Meropenem + Vanco for rx of Sepsis secondary to Multifocal PNA as a complication of Influenza A infection.  WBC's slightly decreased today.  Noted Vanco trough level to be elevated.      Suggestions:  Will d/c Vanco now and continue Meropenem alone for another 1-2 days.  Follow-up CXR and labs.  If patient remains afebrile, WBC's continue to decrease, and CXR remains unchanged or improved, would then consider d/c ab rx and observe.  Discussed with patient at bedside.

## 2018-02-04 LAB
ANION GAP SERPL CALC-SCNC: 9 MMOL/L — SIGNIFICANT CHANGE UP (ref 5–17)
BUN SERPL-MCNC: 25 MG/DL — HIGH (ref 7–23)
CALCIUM SERPL-MCNC: 8 MG/DL — LOW (ref 8.5–10.1)
CHLORIDE SERPL-SCNC: 107 MMOL/L — SIGNIFICANT CHANGE UP (ref 96–108)
CO2 SERPL-SCNC: 27 MMOL/L — SIGNIFICANT CHANGE UP (ref 22–31)
CREAT SERPL-MCNC: 0.85 MG/DL — SIGNIFICANT CHANGE UP (ref 0.5–1.3)
GLUCOSE SERPL-MCNC: 86 MG/DL — SIGNIFICANT CHANGE UP (ref 70–99)
HCT VFR BLD CALC: 33.8 % — LOW (ref 39–50)
HGB BLD-MCNC: 11.1 G/DL — LOW (ref 13–17)
MCHC RBC-ENTMCNC: 30.1 PG — SIGNIFICANT CHANGE UP (ref 27–34)
MCHC RBC-ENTMCNC: 32.8 GM/DL — SIGNIFICANT CHANGE UP (ref 32–36)
MCV RBC AUTO: 91.6 FL — SIGNIFICANT CHANGE UP (ref 80–100)
NRBC # BLD: 0 /100 WBCS — SIGNIFICANT CHANGE UP (ref 0–0)
PLATELET # BLD AUTO: 303 K/UL — SIGNIFICANT CHANGE UP (ref 150–400)
POTASSIUM SERPL-MCNC: 3.5 MMOL/L — SIGNIFICANT CHANGE UP (ref 3.5–5.3)
POTASSIUM SERPL-SCNC: 3.5 MMOL/L — SIGNIFICANT CHANGE UP (ref 3.5–5.3)
RBC # BLD: 3.69 M/UL — LOW (ref 4.2–5.8)
RBC # FLD: 13.2 % — SIGNIFICANT CHANGE UP (ref 10.3–14.5)
SODIUM SERPL-SCNC: 143 MMOL/L — SIGNIFICANT CHANGE UP (ref 135–145)
WBC # BLD: 15.68 K/UL — HIGH (ref 3.8–10.5)
WBC # FLD AUTO: 15.68 K/UL — HIGH (ref 3.8–10.5)

## 2018-02-04 RX ORDER — DEXTROSE MONOHYDRATE, SODIUM CHLORIDE, AND POTASSIUM CHLORIDE 50; .745; 4.5 G/1000ML; G/1000ML; G/1000ML
1000 INJECTION, SOLUTION INTRAVENOUS
Qty: 0 | Refills: 0 | Status: DISCONTINUED | OUTPATIENT
Start: 2018-02-04 | End: 2018-02-13

## 2018-02-04 RX ADMIN — Medication 0.5 MILLIGRAM(S): at 17:23

## 2018-02-04 RX ADMIN — Medication 200 MILLIGRAM(S): at 06:07

## 2018-02-04 RX ADMIN — DEXTROSE MONOHYDRATE, SODIUM CHLORIDE, AND POTASSIUM CHLORIDE 75 MILLILITER(S): 50; .745; 4.5 INJECTION, SOLUTION INTRAVENOUS at 14:58

## 2018-02-04 RX ADMIN — MEROPENEM 100 MILLIGRAM(S): 1 INJECTION INTRAVENOUS at 13:59

## 2018-02-04 RX ADMIN — Medication 0.5 MILLIGRAM(S): at 05:40

## 2018-02-04 RX ADMIN — Medication 3 MILLILITER(S): at 23:57

## 2018-02-04 RX ADMIN — Medication 1 TABLET(S): at 06:06

## 2018-02-04 RX ADMIN — Medication 3 MILLILITER(S): at 17:25

## 2018-02-04 RX ADMIN — MEROPENEM 100 MILLIGRAM(S): 1 INJECTION INTRAVENOUS at 23:05

## 2018-02-04 RX ADMIN — HALOPERIDOL DECANOATE 0.25 MILLIGRAM(S): 100 INJECTION INTRAMUSCULAR at 06:06

## 2018-02-04 RX ADMIN — Medication 40 MILLIGRAM(S): at 06:06

## 2018-02-04 RX ADMIN — ATENOLOL 50 MILLIGRAM(S): 25 TABLET ORAL at 06:06

## 2018-02-04 RX ADMIN — ATORVASTATIN CALCIUM 20 MILLIGRAM(S): 80 TABLET, FILM COATED ORAL at 23:05

## 2018-02-04 RX ADMIN — Medication 3 MILLILITER(S): at 11:14

## 2018-02-04 RX ADMIN — PANTOPRAZOLE SODIUM 40 MILLIGRAM(S): 20 TABLET, DELAYED RELEASE ORAL at 19:03

## 2018-02-04 RX ADMIN — Medication 3 MILLILITER(S): at 05:40

## 2018-02-04 RX ADMIN — MEROPENEM 100 MILLIGRAM(S): 1 INJECTION INTRAVENOUS at 06:02

## 2018-02-04 RX ADMIN — Medication 3 MILLILITER(S): at 00:28

## 2018-02-04 RX ADMIN — Medication 3 MILLILITER(S): at 17:23

## 2018-02-04 RX ADMIN — Medication 3 MILLILITER(S): at 11:15

## 2018-02-04 RX ADMIN — PANTOPRAZOLE SODIUM 40 MILLIGRAM(S): 20 TABLET, DELAYED RELEASE ORAL at 06:06

## 2018-02-04 RX ADMIN — Medication 200 MILLIGRAM(S): at 23:05

## 2018-02-04 NOTE — PROGRESS NOTE ADULT - SUBJECTIVE AND OBJECTIVE BOX
Awake, alert, claims to be feeling much better today - told me he slept for 3hrs. earlier today and is currently finishing his dinner.  Requesting his coffee with milk and sugar to be added.  Denies any cp, no c/o SOB, and cough seems to be decreasing.  Appetite is improving.        PHYSICAL EXAM  General: awake, alert, sitting up in bed, wearing his NCO2 , pleasant and cooperative, [place], in NAD now   HEENT:  conj pink, sclerae anicteric, PERRLA, no oral lesions noted, edentulous now ( dentures have been removed )  Neck:  semi-supple, no nodes noted  Heart:  RR  Lungs: decreased BS at bases bilaterally, with minimal rhonchi upper airway which clear with coughing, no wheezing noted   Abdomen:  soft, BS +, nontender to palpation  No CVA or Spinal tenderness elicited  Extremities:  no edema LE's   Skin: warm, dry, no rash noted       a/p over all prognosis is poor   cont all med   sx controlled   pt is improving   no change in prognosis

## 2018-02-04 NOTE — PROGRESS NOTE ADULT - SUBJECTIVE AND OBJECTIVE BOX
Confused  Minimal food intake  Urinary retention, Avalos reinstated  Vanco trough >  On hold  Repeat level in am  Calorie count minimal intake  Prognosis guarded-poor  ? PEG

## 2018-02-05 LAB
ANION GAP SERPL CALC-SCNC: 6 MMOL/L — SIGNIFICANT CHANGE UP (ref 5–17)
BASOPHILS # BLD AUTO: 0.15 K/UL — SIGNIFICANT CHANGE UP (ref 0–0.2)
BASOPHILS NFR BLD AUTO: 1 % — SIGNIFICANT CHANGE UP (ref 0–2)
BUN SERPL-MCNC: 23 MG/DL — SIGNIFICANT CHANGE UP (ref 7–23)
CALCIUM SERPL-MCNC: 7.8 MG/DL — LOW (ref 8.5–10.1)
CHLORIDE SERPL-SCNC: 105 MMOL/L — SIGNIFICANT CHANGE UP (ref 96–108)
CO2 SERPL-SCNC: 29 MMOL/L — SIGNIFICANT CHANGE UP (ref 22–31)
CREAT SERPL-MCNC: 0.98 MG/DL — SIGNIFICANT CHANGE UP (ref 0.5–1.3)
EOSINOPHIL # BLD AUTO: 0.65 K/UL — HIGH (ref 0–0.5)
EOSINOPHIL NFR BLD AUTO: 4.5 % — SIGNIFICANT CHANGE UP (ref 0–6)
GLUCOSE SERPL-MCNC: 131 MG/DL — HIGH (ref 70–99)
HCT VFR BLD CALC: 31.5 % — LOW (ref 39–50)
HGB BLD-MCNC: 10.4 G/DL — LOW (ref 13–17)
IMM GRANULOCYTES NFR BLD AUTO: 3.7 % — HIGH (ref 0–1.5)
LYMPHOCYTES # BLD AUTO: 21.9 % — SIGNIFICANT CHANGE UP (ref 13–44)
LYMPHOCYTES # BLD AUTO: 3.13 K/UL — SIGNIFICANT CHANGE UP (ref 1–3.3)
MCHC RBC-ENTMCNC: 30.1 PG — SIGNIFICANT CHANGE UP (ref 27–34)
MCHC RBC-ENTMCNC: 33 GM/DL — SIGNIFICANT CHANGE UP (ref 32–36)
MCV RBC AUTO: 91 FL — SIGNIFICANT CHANGE UP (ref 80–100)
MONOCYTES # BLD AUTO: 1.25 K/UL — HIGH (ref 0–0.9)
MONOCYTES NFR BLD AUTO: 8.7 % — SIGNIFICANT CHANGE UP (ref 2–14)
NEUTROPHILS # BLD AUTO: 8.59 K/UL — HIGH (ref 1.8–7.4)
NEUTROPHILS NFR BLD AUTO: 60.2 % — SIGNIFICANT CHANGE UP (ref 43–77)
NRBC # BLD: 0 /100 WBCS — SIGNIFICANT CHANGE UP (ref 0–0)
PLATELET # BLD AUTO: 286 K/UL — SIGNIFICANT CHANGE UP (ref 150–400)
POTASSIUM SERPL-MCNC: 3.3 MMOL/L — LOW (ref 3.5–5.3)
POTASSIUM SERPL-SCNC: 3.3 MMOL/L — LOW (ref 3.5–5.3)
RBC # BLD: 3.46 M/UL — LOW (ref 4.2–5.8)
RBC # FLD: 13.2 % — SIGNIFICANT CHANGE UP (ref 10.3–14.5)
SODIUM SERPL-SCNC: 140 MMOL/L — SIGNIFICANT CHANGE UP (ref 135–145)
VANCOMYCIN TROUGH SERPL-MCNC: 16.1 UG/ML — SIGNIFICANT CHANGE UP (ref 10–20)
WBC # BLD: 14.3 K/UL — HIGH (ref 3.8–10.5)
WBC # FLD AUTO: 14.3 K/UL — HIGH (ref 3.8–10.5)

## 2018-02-05 PROCEDURE — 71045 X-RAY EXAM CHEST 1 VIEW: CPT | Mod: 26

## 2018-02-05 RX ORDER — POTASSIUM CHLORIDE 20 MEQ
40 PACKET (EA) ORAL ONCE
Qty: 0 | Refills: 0 | Status: COMPLETED | OUTPATIENT
Start: 2018-02-05 | End: 2018-02-05

## 2018-02-05 RX ORDER — TAMSULOSIN HYDROCHLORIDE 0.4 MG/1
0.4 CAPSULE ORAL AT BEDTIME
Qty: 0 | Refills: 0 | Status: DISCONTINUED | OUTPATIENT
Start: 2018-02-05 | End: 2018-02-13

## 2018-02-05 RX ADMIN — Medication 3 MILLILITER(S): at 23:58

## 2018-02-05 RX ADMIN — MEROPENEM 100 MILLIGRAM(S): 1 INJECTION INTRAVENOUS at 21:37

## 2018-02-05 RX ADMIN — Medication 1 TABLET(S): at 17:39

## 2018-02-05 RX ADMIN — PANTOPRAZOLE SODIUM 40 MILLIGRAM(S): 20 TABLET, DELAYED RELEASE ORAL at 17:40

## 2018-02-05 RX ADMIN — Medication 0.5 MILLIGRAM(S): at 05:51

## 2018-02-05 RX ADMIN — MEROPENEM 100 MILLIGRAM(S): 1 INJECTION INTRAVENOUS at 06:32

## 2018-02-05 RX ADMIN — Medication 3 MILLILITER(S): at 12:09

## 2018-02-05 RX ADMIN — HALOPERIDOL DECANOATE 0.25 MILLIGRAM(S): 100 INJECTION INTRAMUSCULAR at 17:41

## 2018-02-05 RX ADMIN — Medication 3 MILLILITER(S): at 18:30

## 2018-02-05 RX ADMIN — DEXTROSE MONOHYDRATE, SODIUM CHLORIDE, AND POTASSIUM CHLORIDE 75 MILLILITER(S): 50; .745; 4.5 INJECTION, SOLUTION INTRAVENOUS at 00:18

## 2018-02-05 RX ADMIN — Medication 3 MILLILITER(S): at 05:50

## 2018-02-05 RX ADMIN — Medication 200 MILLIGRAM(S): at 17:42

## 2018-02-05 RX ADMIN — TAMSULOSIN HYDROCHLORIDE 0.4 MILLIGRAM(S): 0.4 CAPSULE ORAL at 21:36

## 2018-02-05 RX ADMIN — Medication 0.5 MILLIGRAM(S): at 18:30

## 2018-02-05 RX ADMIN — Medication 3 MILLILITER(S): at 18:31

## 2018-02-05 RX ADMIN — PANTOPRAZOLE SODIUM 40 MILLIGRAM(S): 20 TABLET, DELAYED RELEASE ORAL at 06:32

## 2018-02-05 RX ADMIN — Medication 20 MILLIEQUIVALENT(S): at 12:16

## 2018-02-05 RX ADMIN — MEROPENEM 100 MILLIGRAM(S): 1 INJECTION INTRAVENOUS at 13:36

## 2018-02-05 RX ADMIN — Medication 40 MILLIEQUIVALENT(S): at 10:55

## 2018-02-05 NOTE — CONSULT NOTE ADULT - SUBJECTIVE AND OBJECTIVE BOX
HPI:  89 y/o white male with hx Paroxysmal A Fib, HTN, Increased Cholesterol, Gout, s/p Rt Cataract Surgery, admitted today via the ER from [facility] with SOB initially requiring NRB O2 and found to be febrile to 101.7 with wheezing and rhonchi on physical exam.  W/u with Rapid Influenza testing was done and now reported Positive for Influenza A and 2 Blood Cx's and Urine Cx were obtained and pending.  CXR was done and reported now with a RLL Infiltrate along with trace Pleural effusion.  Patient is originally from his own home and was admitted to [facility] from  to  during which time he was rx'ed for Multifocal Rt PNA with Zosyn and Vanco and then d/c'ed to [facility] on po Augmentin.  Patient was again seen in the ER on  after he apparently tripped on a telephone wire while at [facility] and sustained a laceration to the Lt Scalp which required sutures and patient was then d/c'ed  back to [facility].  Patient now c/o cough with only intermittent sputum production since yesterday along with increased SOB and fatigue.  No c/o cp, no abdominal pain, no N/V but c/o soft stools which he cannot always control.  No c/o dysuria or hesitancy, and claims his appetite is fair.   Patient was begun empirically on Zosyn and Vanco 2 days prior to ER but got > SOB (2018 09:33)      PAST MEDICAL & SURGICAL HISTORY:  Paroxysmal atrial fibrillation  Gout  Pneumonia  Elevated cholesterol  HTN (hypertension)  History of cataract surgery, right      Allergies    aspirin (Stomach Upset)    FAMILY HISTORY:  No pertinent family history in first degree relatives      Home Medications:  acetaminophen 650 mg oral tablet:  (2018 17:41)  allopurinol 100 mg oral tablet: 1 tab(s) orally 2 times a day (2018 17:41)  aspirin 325 mg oral tablet: 1 tab(s) orally once a day (2018 17:41)  atenolol 50 mg oral tablet: 1 tab(s) orally once a day (2018 17:41)  Bacid (LAC) oral tablet: 2 tab(s) orally once a day (2018 17:41)  Colace 100 mg oral capsule: 1 cap(s) orally 2 times a day (2018 17:41)  DuoNeb 0.5 mg-2.5 mg/3 mL inhalation solution: 3 milliliter(s) inhaled 4 times a day (2018 17:41)  hydroCHLOROthiazide 25 mg oral tablet: 1 tab(s) orally once a day (2018 17:41)  Lipitor 10 mg oral tablet: 1 tab(s) orally once a day (2018 17:41)  Milk of Magnesia 8% oral suspension: 15 milliliter(s) orally once a day (at bedtime), As Needed (2018 17:41)  Mucinex: 1200  orally 2 times a day (2018 17:41)  Senna 8.6 mg oral tablet: 1 tab(s) orally once a day (at bedtime) (2018 17:41)  vancomycin 750 mg/150 mL-D5% intravenous solution: intravenous every 12 hours (2018 17:41)  Zosyn 3 g-0.375 g/50 mL intravenous solution: intravenous every 12 hours (2018 17:41)      MEDICATIONS  (STANDING):  acetylcysteine 10% Inhalation 3 milliLiter(s) Inhalation every 6 hours  ALBUTerol/ipratropium for Nebulization 3 milliLiter(s) Nebulizer every 6 hours  aspirin enteric coated 81 milliGRAM(s) Oral daily  ATENolol  Tablet 50 milliGRAM(s) Oral daily  atorvastatin 20 milliGRAM(s) Oral at bedtime  buDESOnide   0.5 milliGRAM(s) Respule 0.5 milliGRAM(s) Inhalation every 12 hours  dextrose 5% with potassium chloride 20 mEq/L 1000 milliLiter(s) (75 mL/Hr) IV Continuous <Continuous>  furosemide    Tablet 40 milliGRAM(s) Oral daily  guaiFENesin    Syrup 200 milliGRAM(s) Oral every 4 hours  haloperidol     Tablet 0.25 milliGRAM(s) Oral two times a day  lactobacillus acidophilus 1 Tablet(s) Oral every 12 hours  meropenem IVPB      meropenem IVPB 1000 milliGRAM(s) IV Intermittent every 8 hours  pantoprazole  Injectable 40 milliGRAM(s) IV Push every 12 hours  potassium chloride    Tablet ER 20 milliEquivalent(s) Oral daily    MEDICATIONS  (PRN):  acetaminophen   Tablet. 650 milliGRAM(s) Oral every 6 hours PRN Moderate Pain (4 - 6)  acetaminophen  Suppository 650 milliGRAM(s) Rectal every 6 hours PRN For Temp greater than 38 C (100.4 F)      ROS:    General:  No wt loss, fevers, chills, night sweats  ENT:  No sore throat, pain, runny nose, Hard of Hearing  CV:  No pain, palpitatioins,  Resp:  No dyspnea, cough, tachypnea, wheezing  GI:  No pain, nausea, vomiting, diarrhea, constipatiion  :  No pain, bleeding, incontinence, nocturia, frequency, Has Avalos catheter  Neuro:  No weakness, tingling,   Endocrine:  No polyuria, polydypsia, cold/heat intolerance  Skin:  No rash,  edema      Physical Exam:    Vital Signs:  Vital Signs Last 24 Hrs  T(C): 36.1 (2018 13:49), Max: 36.8 (2018 17:15)  T(F): 97 (2018 13:49), Max: 98.3 (2018 17:15)  HR: 81 (2018 13:49) (67 - 88)  BP: 98/54 (2018 13:49) (98/54 - 118/52)  BP(mean): --  RR: 18 (2018 13:49) (17 - 18)  SpO2: 97% (2018 13:49) (93% - 98%)  Daily     Daily Weight in k.5 (2018 05:44)  I&O's Summary    2018 07:01  -  2018 07:00  --------------------------------------------------------  IN: 1350 mL / OUT: 1900 mL / NET: -550 mL        General:  Appears stated age,  well-nourished, no distress  HEENT:  NC/AT, patent nares w/ pink mucosa, OP moist and pink,   conjunctivae clear  Chest:  Full & symmetric excursion, no increased effort.   Abdomen:  Soft, non-tender, non-distended, normoactive bowel sounds.  Genitalia: Circumcised Phallus, Adequate meatus, no hypospadias. Both testicles descended, non tender, no mass. No epididymitis or epididymal mass. No hydrocele. Avalos catheter draining clear urine  Rectal Examination: Deferred.  Extremities:  no edema, pedal pulsation are present, no calf tenderness.  Skin:  No rash/erythema  Neuro/Psych:  Alert and conscious. Grossly intact and symmetrical.      LABS:                        10.4   14.30 )-----------( 286      ( 2018 06:07 )             31.5         140  |  105  |  23  ----------------------------<  131<H>  3.3<L>   |  29  |  0.98    Ca    7.8<L>      2018 06:07              RADIOLOGY & ADDITIONAL STUDIES:    < from: Xray Chest 1 View- PORTABLE-Routine (18 @ 07:58) >  EXAM:  XR CHEST PORTABLE ROUTINE 1V                            PROCEDURE DATE:  2018          INTERPRETATION:  AP semierect chest on 2018 at 7:09 AM.   Patient is being followed for pneumonia.    Heart is magnified by technique.    Infiltrate emanating off the left hilum is again noted and there is some   left base infiltrate with effusion again seen.    These findings are similar to .    Infiltrate off the right lower hilum suggests minimal improvement.   Scarlike area in the right mid lateral lower lung field is again noted.    IMPRESSION: There may be slight improvement in right-sided infiltrate.   There is a persistent left-sided infiltrate.

## 2018-02-05 NOTE — PROGRESS NOTE ADULT - SUBJECTIVE AND OBJECTIVE BOX
AAO x 2  VSS  improved  Lungs fairly clear  Poor food intake  Insists to be d/c home  Palliative care noted  I'll discuss with son the management  Discussed c Dr Barnett, karen Avalos for now

## 2018-02-05 NOTE — CONSULT NOTE ADULT - ASSESSMENT
urinary retention:  treated with Avalos catheter. TOV failed.    will start on Flomax and give TOV again. If failed will suggest percutaneous cystostomy.

## 2018-02-05 NOTE — PROGRESS NOTE ADULT - SUBJECTIVE AND OBJECTIVE BOX
Awake, alert, claims to be feeling much better today - told me he slept for 3hrs. earlier today and is currently finishing his dinner.  Requesting his coffee with milk and sugar to be added.  Denies any cp, no c/o SOB, and cough seems to be decreasing.  Appetite is improving.        PHYSICAL EXAM  General: awake, alert, sitting up in bed, wearing his NCO2 , pleasant and cooperative, [place], in NAD now   HEENT:  conj pink, sclerae anicteric, PERRLA, no oral lesions noted, edentulous now ( dentures have been removed )  Neck:  semi-supple, no nodes noted  Heart:  RR  Lungs: decreased BS at bases bilaterally, with minimal rhonchi upper airway which clear with coughing, no wheezing noted   Abdomen:  soft, BS +, nontender to palpation  No CVA or Spinal tenderness elicited  Extremities:  no edema LE's   Skin: warm, dry, no rash noted       a/p over all prognosis is poor   cont all med   meds rev  pmhx rev  vss  lungs cta   cvs n   a/p no real change   cont same care

## 2018-02-06 LAB
ANION GAP SERPL CALC-SCNC: 6 MMOL/L — SIGNIFICANT CHANGE UP (ref 5–17)
BUN SERPL-MCNC: 20 MG/DL — SIGNIFICANT CHANGE UP (ref 7–23)
CALCIUM SERPL-MCNC: 7.8 MG/DL — LOW (ref 8.5–10.1)
CHLORIDE SERPL-SCNC: 106 MMOL/L — SIGNIFICANT CHANGE UP (ref 96–108)
CO2 SERPL-SCNC: 28 MMOL/L — SIGNIFICANT CHANGE UP (ref 22–31)
CREAT SERPL-MCNC: 0.83 MG/DL — SIGNIFICANT CHANGE UP (ref 0.5–1.3)
GLUCOSE SERPL-MCNC: 106 MG/DL — HIGH (ref 70–99)
HCT VFR BLD CALC: 30 % — LOW (ref 39–50)
HGB BLD-MCNC: 10 G/DL — LOW (ref 13–17)
MCHC RBC-ENTMCNC: 29.9 PG — SIGNIFICANT CHANGE UP (ref 27–34)
MCHC RBC-ENTMCNC: 33.3 GM/DL — SIGNIFICANT CHANGE UP (ref 32–36)
MCV RBC AUTO: 89.6 FL — SIGNIFICANT CHANGE UP (ref 80–100)
NRBC # BLD: 0 /100 WBCS — SIGNIFICANT CHANGE UP (ref 0–0)
PLATELET # BLD AUTO: 250 K/UL — SIGNIFICANT CHANGE UP (ref 150–400)
POTASSIUM SERPL-MCNC: 3.7 MMOL/L — SIGNIFICANT CHANGE UP (ref 3.5–5.3)
POTASSIUM SERPL-SCNC: 3.7 MMOL/L — SIGNIFICANT CHANGE UP (ref 3.5–5.3)
RBC # BLD: 3.35 M/UL — LOW (ref 4.2–5.8)
RBC # FLD: 13.2 % — SIGNIFICANT CHANGE UP (ref 10.3–14.5)
SODIUM SERPL-SCNC: 140 MMOL/L — SIGNIFICANT CHANGE UP (ref 135–145)
WBC # BLD: 15.46 K/UL — HIGH (ref 3.8–10.5)
WBC # FLD AUTO: 15.46 K/UL — HIGH (ref 3.8–10.5)

## 2018-02-06 RX ADMIN — PANTOPRAZOLE SODIUM 40 MILLIGRAM(S): 20 TABLET, DELAYED RELEASE ORAL at 05:14

## 2018-02-06 RX ADMIN — Medication 81 MILLIGRAM(S): at 12:14

## 2018-02-06 RX ADMIN — Medication 200 MILLIGRAM(S): at 13:05

## 2018-02-06 RX ADMIN — HALOPERIDOL DECANOATE 0.25 MILLIGRAM(S): 100 INJECTION INTRAMUSCULAR at 05:09

## 2018-02-06 RX ADMIN — Medication 200 MILLIGRAM(S): at 22:09

## 2018-02-06 RX ADMIN — Medication 3 MILLILITER(S): at 17:02

## 2018-02-06 RX ADMIN — MEROPENEM 100 MILLIGRAM(S): 1 INJECTION INTRAVENOUS at 13:02

## 2018-02-06 RX ADMIN — Medication 3 MILLILITER(S): at 12:03

## 2018-02-06 RX ADMIN — TAMSULOSIN HYDROCHLORIDE 0.4 MILLIGRAM(S): 0.4 CAPSULE ORAL at 22:00

## 2018-02-06 RX ADMIN — MEROPENEM 100 MILLIGRAM(S): 1 INJECTION INTRAVENOUS at 05:09

## 2018-02-06 RX ADMIN — Medication 40 MILLIGRAM(S): at 05:10

## 2018-02-06 RX ADMIN — Medication 3 MILLILITER(S): at 05:53

## 2018-02-06 RX ADMIN — ATORVASTATIN CALCIUM 20 MILLIGRAM(S): 80 TABLET, FILM COATED ORAL at 21:59

## 2018-02-06 RX ADMIN — Medication 20 MILLIEQUIVALENT(S): at 12:14

## 2018-02-06 RX ADMIN — PANTOPRAZOLE SODIUM 40 MILLIGRAM(S): 20 TABLET, DELAYED RELEASE ORAL at 17:08

## 2018-02-06 RX ADMIN — ATENOLOL 50 MILLIGRAM(S): 25 TABLET ORAL at 05:10

## 2018-02-06 RX ADMIN — Medication 1 TABLET(S): at 17:07

## 2018-02-06 RX ADMIN — DEXTROSE MONOHYDRATE, SODIUM CHLORIDE, AND POTASSIUM CHLORIDE 75 MILLILITER(S): 50; .745; 4.5 INJECTION, SOLUTION INTRAVENOUS at 22:09

## 2018-02-06 RX ADMIN — HALOPERIDOL DECANOATE 0.25 MILLIGRAM(S): 100 INJECTION INTRAMUSCULAR at 17:08

## 2018-02-06 RX ADMIN — Medication 1 TABLET(S): at 05:09

## 2018-02-06 RX ADMIN — MEROPENEM 100 MILLIGRAM(S): 1 INJECTION INTRAVENOUS at 21:59

## 2018-02-06 RX ADMIN — Medication 200 MILLIGRAM(S): at 17:10

## 2018-02-06 RX ADMIN — Medication 0.5 MILLIGRAM(S): at 05:54

## 2018-02-06 RX ADMIN — Medication 0.5 MILLIGRAM(S): at 17:04

## 2018-02-06 RX ADMIN — Medication 3 MILLILITER(S): at 05:54

## 2018-02-06 RX ADMIN — Medication 3 MILLILITER(S): at 17:01

## 2018-02-06 NOTE — CHART NOTE - NSCHARTNOTEFT_GEN_A_CORE
Assessment:     89 y.o. male recent discharge (12/27) c PNA sent to rehab, admitted from [facility] d/t SOB, + flu  Pt lethargic, confused, arousable & minimally verbal at times.  MD advised family re: poor PO intake; Hospice eval being considered by family. MOLST form completed; DNR/DNI; no tube feeding to be initiated    Factors impacting intake: [ ] none [ ] nausea  [ ] vomiting [ ] diarrhea [ ] constipation  [ ]chewing problems [ ] swallowing issues  [X ] other: AMS, lack of appetite, inability to self-feed    Diet Prescription: Diet, Dysphagia 2 Mechanical Soft-Nectar Consistency Fluid:   Supplement Feeding Modality:  Oral  Ensure Pudding Cans or Servings Per Day:  1       Frequency:  Three Times a day (01-22-18 @ 15:52)    Intake: total feeding assistance; PO intake remains poor; 0-25% most meals. Results of 3-day calorie count conforms poor intake; pt consuming small amounts of thickened liquids  (broths, juices, milk); no substantial kcals consumed.    Current Weight: 69 kg (2/6); previous wt 72.5 kg (2/1); 69.2 kg (1/26); possible 2/1 wt not accurate?   % Weight Change    Pertinent Medications: MEDICATIONS  (STANDING):  acetylcysteine 10% Inhalation 3 milliLiter(s) Inhalation every 6 hours  ALBUTerol/ipratropium for Nebulization 3 milliLiter(s) Nebulizer every 6 hours  aspirin enteric coated 81 milliGRAM(s) Oral daily  ATENolol  Tablet 50 milliGRAM(s) Oral daily  atorvastatin 20 milliGRAM(s) Oral at bedtime  buDESOnide   0.5 milliGRAM(s) Respule 0.5 milliGRAM(s) Inhalation every 12 hours  dextrose 5% with potassium chloride 20 mEq/L 1000 milliLiter(s) (75 mL/Hr) IV Continuous <Continuous>  furosemide    Tablet 40 milliGRAM(s) Oral daily  guaiFENesin    Syrup 200 milliGRAM(s) Oral every 4 hours  haloperidol     Tablet 0.25 milliGRAM(s) Oral two times a day  lactobacillus acidophilus 1 Tablet(s) Oral every 12 hours  meropenem IVPB      meropenem IVPB 1000 milliGRAM(s) IV Intermittent every 8 hours  pantoprazole  Injectable 40 milliGRAM(s) IV Push every 12 hours  potassium chloride    Tablet ER 20 milliEquivalent(s) Oral daily  tamsulosin 0.4 milliGRAM(s) Oral at bedtime    MEDICATIONS  (PRN):  acetaminophen   Tablet. 650 milliGRAM(s) Oral every 6 hours PRN Moderate Pain (4 - 6)  acetaminophen  Suppository 650 milliGRAM(s) Rectal every 6 hours PRN For Temp greater than 38 C (100.4 F)    Pertinent Labs: 02-06 Na140 mmol/L Glu 106 mg/dL<H> K+ 3.7 mmol/L Cr  0.83 mg/dL BUN 20 mg/dL Phos n/a   Alb n/a   PAB n/a        CAPILLARY BLOOD GLUCOSE      Skin: Suspected DTI on sacrum; no edema noted  + fecal & urinary incontinence; + for Avalos    Estimated Needs:   [X ] no change since previous assessment  [ ] recalculated:     Previous Nutrition Diagnosis:   [ ] Inadequate Energy Intake [ ]Inadequate Oral Intake [ ] Excessive Energy Intake   [ ] Underweight [ ] Increased Nutrient Needs [ ] Overweight/Obesity   [ ] Altered GI Function [ ] Unintended Weight Loss [ ] Food & Nutrition Related Knowledge Deficit   [X ] Severe Malnutrition in context of acute illness    Nutrition Diagnosis is [X ] ongoing  [ ] resolved [ ] not applicable     New Nutrition Diagnosis: [X ] not applicable       Interventions:   Recommend  [x ] Change Diet To: Dysphagia 1 - pureed consistency c nectar-thick liquids  [X ] Nutrition Supplement: Ensure Pudding x 3/day (provides 510 kcal, 12 g protein)   [ ] Nutrition Support  [ ] Other:     Monitoring and Evaluation:   [X ] PO intake [ x ] Tolerance to diet prescription [ x ] weights [ x ] labs[ x ] follow up per protocol  [ ] other:

## 2018-02-06 NOTE — PROGRESS NOTE ADULT - SUBJECTIVE AND OBJECTIVE BOX
Patient is a 89y old  Male who presents with a chief complaint of SOB, cough (13 Jan 2018 09:33)      INTERVAL HPI/OVERNIGHT EVENTS:    MEDICATIONS  (STANDING):  acetylcysteine 10% Inhalation 3 milliLiter(s) Inhalation every 6 hours  ALBUTerol/ipratropium for Nebulization 3 milliLiter(s) Nebulizer every 6 hours  aspirin enteric coated 81 milliGRAM(s) Oral daily  ATENolol  Tablet 50 milliGRAM(s) Oral daily  atorvastatin 20 milliGRAM(s) Oral at bedtime  buDESOnide   0.5 milliGRAM(s) Respule 0.5 milliGRAM(s) Inhalation every 12 hours  dextrose 5% with potassium chloride 20 mEq/L 1000 milliLiter(s) (75 mL/Hr) IV Continuous <Continuous>  furosemide    Tablet 40 milliGRAM(s) Oral daily  guaiFENesin    Syrup 200 milliGRAM(s) Oral every 4 hours  haloperidol     Tablet 0.25 milliGRAM(s) Oral two times a day  lactobacillus acidophilus 1 Tablet(s) Oral every 12 hours  meropenem IVPB      meropenem IVPB 1000 milliGRAM(s) IV Intermittent every 8 hours  pantoprazole  Injectable 40 milliGRAM(s) IV Push every 12 hours  potassium chloride    Tablet ER 20 milliEquivalent(s) Oral daily  tamsulosin 0.4 milliGRAM(s) Oral at bedtime    MEDICATIONS  (PRN):  acetaminophen   Tablet. 650 milliGRAM(s) Oral every 6 hours PRN Moderate Pain (4 - 6)  acetaminophen  Suppository 650 milliGRAM(s) Rectal every 6 hours PRN For Temp greater than 38 C (100.4 F)      Allergies    aspirin (Stomach Upset)    Intolerances        REVIEW OF SYSTEMS: lethargic        Vital Signs Last 24 Hrs  T(C): 36.1 (06 Feb 2018 05:10), Max: 36.8 (06 Feb 2018 00:05)  T(F): 96.9 (06 Feb 2018 05:10), Max: 98.2 (06 Feb 2018 00:05)  HR: 85 (06 Feb 2018 05:54) (67 - 92)  BP: 144/60 (06 Feb 2018 05:10) (98/54 - 144/60)  BP(mean): --  RR: 20 (06 Feb 2018 05:10) (17 - 20)  SpO2: 100% (06 Feb 2018 05:54) (95% - 100%)    PHYSICAL EXAM:  general       HEENT wnl  CHEST/LUNG: Clear to percussion bilaterally; No rales, rhonchi, wheezing, or rubs  HEART: Regular rate and rhythm; No murmurs, rubs, or gallops  ABDOMEN: Soft, Nontender, Nondistended; Bowel sounds present  EXTREMITIES:  2+ Peripheral Pulses, No clubbing, cyanosis, or edema  LYMPH: No lymphadenopathy noted  SKIN: No rashes or lesions    LABS:                        10.0   15.46 )-----------( 250      ( 06 Feb 2018 07:22 )             30.0     02-06    140  |  106  |  20  ----------------------------<  106<H>  3.7   |  28  |  0.83    Ca    7.8<L>      06 Feb 2018 07:22          CAPILLARY BLOOD GLUCOSE                    RADIOLOGY & ADDITIONAL TESTS:    Imaging Personally Reviewed:  [y ] YES  [ ] NO    Consultant(s) Notes Reviewed:  [y ] YES  [ ] NO    Care Discussed with Consultants/Other Providers [ ] YES  [ ] NO    PROBLEMS:  PNEUMONIA, SEPSIS  DIFFICULTY BREATHING  Gram negative sepsis  Pneumonia  Delirium due to another medical condition, acute, mixed level of activity  FTT      Care discussed with family,         [ v ]   yes  [  ]  No    imp:    stable[ ]    unstable[  ]     improving [   ]       unchanged  [ v ]                Plans: discussed with son at length. He is aware that pt consumes < 25%, tomorrow decision will be made re hospice evaluation

## 2018-02-07 LAB
ANION GAP SERPL CALC-SCNC: 5 MMOL/L — SIGNIFICANT CHANGE UP (ref 5–17)
BUN SERPL-MCNC: 14 MG/DL — SIGNIFICANT CHANGE UP (ref 7–23)
CALCIUM SERPL-MCNC: 8.2 MG/DL — LOW (ref 8.5–10.1)
CHLORIDE SERPL-SCNC: 103 MMOL/L — SIGNIFICANT CHANGE UP (ref 96–108)
CO2 SERPL-SCNC: 29 MMOL/L — SIGNIFICANT CHANGE UP (ref 22–31)
CREAT SERPL-MCNC: 0.82 MG/DL — SIGNIFICANT CHANGE UP (ref 0.5–1.3)
GLUCOSE SERPL-MCNC: 108 MG/DL — HIGH (ref 70–99)
HCT VFR BLD CALC: 30.8 % — LOW (ref 39–50)
HGB BLD-MCNC: 10.4 G/DL — LOW (ref 13–17)
MCHC RBC-ENTMCNC: 30.5 PG — SIGNIFICANT CHANGE UP (ref 27–34)
MCHC RBC-ENTMCNC: 33.8 GM/DL — SIGNIFICANT CHANGE UP (ref 32–36)
MCV RBC AUTO: 90.3 FL — SIGNIFICANT CHANGE UP (ref 80–100)
NRBC # BLD: 0 /100 WBCS — SIGNIFICANT CHANGE UP (ref 0–0)
PLATELET # BLD AUTO: 241 K/UL — SIGNIFICANT CHANGE UP (ref 150–400)
POTASSIUM SERPL-MCNC: 3.9 MMOL/L — SIGNIFICANT CHANGE UP (ref 3.5–5.3)
POTASSIUM SERPL-SCNC: 3.9 MMOL/L — SIGNIFICANT CHANGE UP (ref 3.5–5.3)
RBC # BLD: 3.41 M/UL — LOW (ref 4.2–5.8)
RBC # FLD: 13.2 % — SIGNIFICANT CHANGE UP (ref 10.3–14.5)
SODIUM SERPL-SCNC: 137 MMOL/L — SIGNIFICANT CHANGE UP (ref 135–145)
WBC # BLD: 14.05 K/UL — HIGH (ref 3.8–10.5)
WBC # FLD AUTO: 14.05 K/UL — HIGH (ref 3.8–10.5)

## 2018-02-07 RX ADMIN — Medication 3 MILLILITER(S): at 05:38

## 2018-02-07 RX ADMIN — Medication 3 MILLILITER(S): at 00:19

## 2018-02-07 RX ADMIN — HALOPERIDOL DECANOATE 0.25 MILLIGRAM(S): 100 INJECTION INTRAMUSCULAR at 05:33

## 2018-02-07 RX ADMIN — Medication 200 MILLIGRAM(S): at 23:25

## 2018-02-07 RX ADMIN — Medication 81 MILLIGRAM(S): at 11:46

## 2018-02-07 RX ADMIN — MEROPENEM 100 MILLIGRAM(S): 1 INJECTION INTRAVENOUS at 13:00

## 2018-02-07 RX ADMIN — Medication 0.5 MILLIGRAM(S): at 05:39

## 2018-02-07 RX ADMIN — Medication 3 MILLILITER(S): at 23:52

## 2018-02-07 RX ADMIN — HALOPERIDOL DECANOATE 0.25 MILLIGRAM(S): 100 INJECTION INTRAMUSCULAR at 17:24

## 2018-02-07 RX ADMIN — Medication 1 TABLET(S): at 05:32

## 2018-02-07 RX ADMIN — Medication 3 MILLILITER(S): at 11:52

## 2018-02-07 RX ADMIN — Medication 200 MILLIGRAM(S): at 09:58

## 2018-02-07 RX ADMIN — Medication 200 MILLIGRAM(S): at 05:33

## 2018-02-07 RX ADMIN — Medication 1 TABLET(S): at 17:24

## 2018-02-07 RX ADMIN — Medication 3 MILLILITER(S): at 17:39

## 2018-02-07 RX ADMIN — DEXTROSE MONOHYDRATE, SODIUM CHLORIDE, AND POTASSIUM CHLORIDE 75 MILLILITER(S): 50; .745; 4.5 INJECTION, SOLUTION INTRAVENOUS at 10:00

## 2018-02-07 RX ADMIN — PANTOPRAZOLE SODIUM 40 MILLIGRAM(S): 20 TABLET, DELAYED RELEASE ORAL at 05:33

## 2018-02-07 RX ADMIN — TAMSULOSIN HYDROCHLORIDE 0.4 MILLIGRAM(S): 0.4 CAPSULE ORAL at 23:24

## 2018-02-07 RX ADMIN — ATENOLOL 50 MILLIGRAM(S): 25 TABLET ORAL at 05:32

## 2018-02-07 RX ADMIN — DEXTROSE MONOHYDRATE, SODIUM CHLORIDE, AND POTASSIUM CHLORIDE 75 MILLILITER(S): 50; .745; 4.5 INJECTION, SOLUTION INTRAVENOUS at 23:24

## 2018-02-07 RX ADMIN — Medication 0.5 MILLIGRAM(S): at 17:39

## 2018-02-07 RX ADMIN — MEROPENEM 100 MILLIGRAM(S): 1 INJECTION INTRAVENOUS at 05:33

## 2018-02-07 RX ADMIN — MEROPENEM 100 MILLIGRAM(S): 1 INJECTION INTRAVENOUS at 23:25

## 2018-02-07 RX ADMIN — Medication 200 MILLIGRAM(S): at 17:24

## 2018-02-07 RX ADMIN — ATORVASTATIN CALCIUM 20 MILLIGRAM(S): 80 TABLET, FILM COATED ORAL at 23:24

## 2018-02-07 RX ADMIN — Medication 3 MILLILITER(S): at 00:18

## 2018-02-07 RX ADMIN — PANTOPRAZOLE SODIUM 40 MILLIGRAM(S): 20 TABLET, DELAYED RELEASE ORAL at 17:24

## 2018-02-07 RX ADMIN — Medication 40 MILLIGRAM(S): at 05:32

## 2018-02-07 NOTE — PROGRESS NOTE ADULT - SUBJECTIVE AND OBJECTIVE BOX
Patient seen and examined bedside resting comfortably. Muttering incoherently.    T(F): 97.1 (02-07-18 @ 05:15), Max: 97.2 (02-06-18 @ 12:19)  HR: 70 (02-07-18 @ 06:13) (62 - 76)  BP: 120/51 (02-07-18 @ 05:15) (120/51 - 146/55)  RR: 18 (02-07-18 @ 05:15) (18 - 20)  SpO2: 96% (02-07-18 @ 06:13) (95% - 99%)    PHYSICAL EXAM:  General: NAD, WDWN, Alert  CV: +S1S2 regular rate and rhythm  Lung: mild wheezing noted.   Abdomen: soft, NT/ND.   : ortiz in place draining clear yellow urine: 800ml/24hours.    LABS:                        10.4   14.05 )-----------( 241      ( 07 Feb 2018 07:23 )             30.8     02-07    137  |  103  |  14  ----------------------------<  108<H>  3.9   |  29  |  0.82    Ca    8.2<L>      07 Feb 2018 07:23    I&O's Detail    06 Feb 2018 07:01  -  07 Feb 2018 07:00  --------------------------------------------------------  IN:    dextrose 5% with potassium chloride 20 mEq/L: 900 mL    Oral Fluid: 60 mL    Solution: 100 mL  Total IN: 1060 mL    OUT:    Indwelling Catheter - Urethral: 800 mL  Total OUT: 800 mL    Total NET: 260 mL    Impression: 89y Male with PMH Paroxysmal atrial fibrillation, Gout, Pneumonia, Elevated cholesterol, and HTN. Admitted with multifocal pneumonia and urinary retention requiring ortiz catheter    Plan:  -continue ortiz catheter, urine output monitoring and trend renal function  -continue Flomax  -will repeat TOV in future, if fails TOV may require SPC  -continue IV antibiotics, trend WBC  -continue current medical management  -will discuss with Dr Pena

## 2018-02-07 NOTE — PROGRESS NOTE ADULT - SUBJECTIVE AND OBJECTIVE BOX
Patient is a 89y old  Male who presents with a chief complaint of SOB, cough (13 Jan 2018 09:33)      INTERVAL HPI/OVERNIGHT EVENTS:    MEDICATIONS  (STANDING):  acetylcysteine 10% Inhalation 3 milliLiter(s) Inhalation every 6 hours  ALBUTerol/ipratropium for Nebulization 3 milliLiter(s) Nebulizer every 6 hours  aspirin enteric coated 81 milliGRAM(s) Oral daily  ATENolol  Tablet 50 milliGRAM(s) Oral daily  atorvastatin 20 milliGRAM(s) Oral at bedtime  buDESOnide   0.5 milliGRAM(s) Respule 0.5 milliGRAM(s) Inhalation every 12 hours  dextrose 5% with potassium chloride 20 mEq/L 1000 milliLiter(s) (75 mL/Hr) IV Continuous <Continuous>  furosemide    Tablet 40 milliGRAM(s) Oral daily  guaiFENesin    Syrup 200 milliGRAM(s) Oral every 4 hours  haloperidol     Tablet 0.25 milliGRAM(s) Oral two times a day  lactobacillus acidophilus 1 Tablet(s) Oral every 12 hours  meropenem IVPB      meropenem IVPB 1000 milliGRAM(s) IV Intermittent every 8 hours  pantoprazole  Injectable 40 milliGRAM(s) IV Push every 12 hours  potassium chloride    Tablet ER 20 milliEquivalent(s) Oral daily  tamsulosin 0.4 milliGRAM(s) Oral at bedtime    MEDICATIONS  (PRN):  acetaminophen   Tablet. 650 milliGRAM(s) Oral every 6 hours PRN Moderate Pain (4 - 6)  acetaminophen  Suppository 650 milliGRAM(s) Rectal every 6 hours PRN For Temp greater than 38 C (100.4 F)      Allergies    aspirin (Stomach Upset)    Intolerances        REVIEW OF SYSTEMS:        HEENT - wnl  RESPIRATORY: No cough, wheezing, chills or hemoptysis; No shortness of breath  CARDIOVASCULAR: No chest pain, palpitations, dizziness, or leg swelling  GASTROINTESTINAL: No abdominal or epigastric pain. No nausea, vomiting, or hematemesis; No diarrhea or constipation. No melena or hematochezia.  GENITOURINARY: No dysuria, frequency, hematuria, or incontinence  SKIN: No itching, burning, rashes, or lesions   MUSCULOSKELETAL: No joint pain or swelling; No muscle, back, or extremity pain  PSYCHIATRIC: No depression, anxiety, mood swings, or difficulty sleeping        Vital Signs Last 24 Hrs  T(C): 36.2 (07 Feb 2018 17:22), Max: 36.6 (07 Feb 2018 11:39)  T(F): 97.2 (07 Feb 2018 17:22), Max: 97.9 (07 Feb 2018 11:39)  HR: 82 (07 Feb 2018 18:16) (69 - 84)  BP: 130/68 (07 Feb 2018 17:22) (117/50 - 130/68)  BP(mean): --  RR: 17 (07 Feb 2018 17:22) (17 - 18)  SpO2: 96% (07 Feb 2018 18:16) (95% - 98%)    PHYSICAL EXAM:  general       HEENT wnl  CHEST/LUNG: Clear to percussion bilaterally; No rales, rhonchi, wheezing, or rubs  HEART: Regular rate and rhythm; No murmurs, rubs, or gallops  ABDOMEN: Soft, Nontender, Nondistended; Bowel sounds present  EXTREMITIES:  2+ Peripheral Pulses, No clubbing, cyanosis, or edema  LYMPH: No lymphadenopathy noted  SKIN: No rashes or lesions    LABS:                        10.4   14.05 )-----------( 241      ( 07 Feb 2018 07:23 )             30.8     02-07    137  |  103  |  14  ----------------------------<  108<H>  3.9   |  29  |  0.82    Ca    8.2<L>      07 Feb 2018 07:23          CAPILLARY BLOOD GLUCOSE                    RADIOLOGY & ADDITIONAL TESTS:    Imaging Personally Reviewed:  [y ] YES  [ ] NO    Consultant(s) Notes Reviewed:  [y ] YES  [ ] NO    Care Discussed with Consultants/Other Providers [ ] YES  [ ] NO    PROBLEMS:  PNEUMONIA, SEPSIS  DIFFICULTY BREATHING  Gram negative sepsis  Pneumonia  Delirium due to another medical condition, acute, mixed level of activity      Care discussed with family,         [  ]   yes  [  ]  No    imp:    stable[ ]    unstable[  ]     improving [   ]       unchanged  [ v ]                Plans:  Continue present plans  [ v ] hospice evaluation

## 2018-02-08 LAB
ANION GAP SERPL CALC-SCNC: 7 MMOL/L — SIGNIFICANT CHANGE UP (ref 5–17)
BUN SERPL-MCNC: 13 MG/DL — SIGNIFICANT CHANGE UP (ref 7–23)
CALCIUM SERPL-MCNC: 8 MG/DL — LOW (ref 8.5–10.1)
CHLORIDE SERPL-SCNC: 101 MMOL/L — SIGNIFICANT CHANGE UP (ref 96–108)
CO2 SERPL-SCNC: 27 MMOL/L — SIGNIFICANT CHANGE UP (ref 22–31)
CREAT SERPL-MCNC: 0.9 MG/DL — SIGNIFICANT CHANGE UP (ref 0.5–1.3)
GLUCOSE SERPL-MCNC: 107 MG/DL — HIGH (ref 70–99)
HCT VFR BLD CALC: 30.6 % — LOW (ref 39–50)
HGB BLD-MCNC: 10 G/DL — LOW (ref 13–17)
MCHC RBC-ENTMCNC: 29.3 PG — SIGNIFICANT CHANGE UP (ref 27–34)
MCHC RBC-ENTMCNC: 32.7 GM/DL — SIGNIFICANT CHANGE UP (ref 32–36)
MCV RBC AUTO: 89.7 FL — SIGNIFICANT CHANGE UP (ref 80–100)
NRBC # BLD: 0 /100 WBCS — SIGNIFICANT CHANGE UP (ref 0–0)
PLATELET # BLD AUTO: 213 K/UL — SIGNIFICANT CHANGE UP (ref 150–400)
POTASSIUM SERPL-MCNC: 3.9 MMOL/L — SIGNIFICANT CHANGE UP (ref 3.5–5.3)
POTASSIUM SERPL-SCNC: 3.9 MMOL/L — SIGNIFICANT CHANGE UP (ref 3.5–5.3)
RBC # BLD: 3.41 M/UL — LOW (ref 4.2–5.8)
RBC # FLD: 13.3 % — SIGNIFICANT CHANGE UP (ref 10.3–14.5)
SODIUM SERPL-SCNC: 135 MMOL/L — SIGNIFICANT CHANGE UP (ref 135–145)
WBC # BLD: 13.36 K/UL — HIGH (ref 3.8–10.5)
WBC # FLD AUTO: 13.36 K/UL — HIGH (ref 3.8–10.5)

## 2018-02-08 RX ADMIN — Medication 3 MILLILITER(S): at 17:59

## 2018-02-08 RX ADMIN — Medication 650 MILLIGRAM(S): at 02:47

## 2018-02-08 RX ADMIN — Medication 1 TABLET(S): at 05:41

## 2018-02-08 RX ADMIN — HALOPERIDOL DECANOATE 0.25 MILLIGRAM(S): 100 INJECTION INTRAMUSCULAR at 05:41

## 2018-02-08 RX ADMIN — MEROPENEM 100 MILLIGRAM(S): 1 INJECTION INTRAVENOUS at 22:03

## 2018-02-08 RX ADMIN — ATENOLOL 50 MILLIGRAM(S): 25 TABLET ORAL at 05:41

## 2018-02-08 RX ADMIN — Medication 3 MILLILITER(S): at 11:49

## 2018-02-08 RX ADMIN — DEXTROSE MONOHYDRATE, SODIUM CHLORIDE, AND POTASSIUM CHLORIDE 75 MILLILITER(S): 50; .745; 4.5 INJECTION, SOLUTION INTRAVENOUS at 13:50

## 2018-02-08 RX ADMIN — Medication 0.5 MILLIGRAM(S): at 07:18

## 2018-02-08 RX ADMIN — ATORVASTATIN CALCIUM 20 MILLIGRAM(S): 80 TABLET, FILM COATED ORAL at 22:04

## 2018-02-08 RX ADMIN — PANTOPRAZOLE SODIUM 40 MILLIGRAM(S): 20 TABLET, DELAYED RELEASE ORAL at 18:13

## 2018-02-08 RX ADMIN — Medication 0.5 MILLIGRAM(S): at 17:59

## 2018-02-08 RX ADMIN — Medication 650 MILLIGRAM(S): at 03:47

## 2018-02-08 RX ADMIN — Medication 3 MILLILITER(S): at 07:18

## 2018-02-08 RX ADMIN — Medication 1 TABLET(S): at 18:13

## 2018-02-08 RX ADMIN — MEROPENEM 100 MILLIGRAM(S): 1 INJECTION INTRAVENOUS at 05:42

## 2018-02-08 RX ADMIN — Medication 200 MILLIGRAM(S): at 05:42

## 2018-02-08 RX ADMIN — PANTOPRAZOLE SODIUM 40 MILLIGRAM(S): 20 TABLET, DELAYED RELEASE ORAL at 05:42

## 2018-02-08 RX ADMIN — TAMSULOSIN HYDROCHLORIDE 0.4 MILLIGRAM(S): 0.4 CAPSULE ORAL at 22:04

## 2018-02-08 RX ADMIN — Medication 40 MILLIGRAM(S): at 05:41

## 2018-02-08 RX ADMIN — Medication 200 MILLIGRAM(S): at 18:13

## 2018-02-08 RX ADMIN — MEROPENEM 100 MILLIGRAM(S): 1 INJECTION INTRAVENOUS at 13:54

## 2018-02-08 NOTE — PROGRESS NOTE ADULT - SUBJECTIVE AND OBJECTIVE BOX
HPI:  89 y/o white male with hx Paroxysmal A Fib, HTN, Increased Cholesterol, Gout, s/p Rt Cataract Surgery, admitted today via the ER from [facility] with SOB initially requiring NRB O2 and found to be febrile to 101.7 with wheezing and rhonchi on physical exam.  W/u with Rapid Influenza testing was done and now reported Positive for Influenza A and 2 Blood Cx's and Urine Cx were obtained and pending.  CXR was done and reported now with a RLL Infiltrate along with trace Pleural effusion.  Patient is originally from his own home and was admitted to [facility] from 12/18 to 12/22 during which time he was rx'ed for Multifocal Rt PNA with Zosyn and Vanco and then d/c'ed to [facility] on po Augmentin.  Patient was again seen in the ER on 1/6 after he apparently tripped on a telephone wire while at [facility] and sustained a laceration to the Lt Scalp which required sutures and patient was then d/c'ed  back to [facility].  Patient now c/o cough with only intermittent sputum production since yesterday along with increased SOB and fatigue.  No c/o cp, no abdominal pain, no N/V but c/o soft stools which he cannot always control.  No c/o dysuria or hesitancy, and claims his appetite is fair.   Patient was begun empirically on Zosyn and Vanco 2 days prior to ER but got > SOB (13 Jan 2018 09:33)  vss  lungs cta   cvs n  a/p adv age w chronic disease burden   overall prognosis poor   no new sx   DNR

## 2018-02-08 NOTE — PROGRESS NOTE ADULT - SUBJECTIVE AND OBJECTIVE BOX
Lethargic  "when am I going home?"  Poor appetite  Afeb VSS  Awaiting hospice evaluation  Prognosis poor

## 2018-02-09 LAB
ANION GAP SERPL CALC-SCNC: 7 MMOL/L — SIGNIFICANT CHANGE UP (ref 5–17)
BUN SERPL-MCNC: 16 MG/DL — SIGNIFICANT CHANGE UP (ref 7–23)
CALCIUM SERPL-MCNC: 8 MG/DL — LOW (ref 8.5–10.1)
CHLORIDE SERPL-SCNC: 100 MMOL/L — SIGNIFICANT CHANGE UP (ref 96–108)
CO2 SERPL-SCNC: 28 MMOL/L — SIGNIFICANT CHANGE UP (ref 22–31)
CREAT SERPL-MCNC: 0.93 MG/DL — SIGNIFICANT CHANGE UP (ref 0.5–1.3)
GLUCOSE SERPL-MCNC: 98 MG/DL — SIGNIFICANT CHANGE UP (ref 70–99)
HCT VFR BLD CALC: 31.8 % — LOW (ref 39–50)
HGB BLD-MCNC: 10.6 G/DL — LOW (ref 13–17)
MCHC RBC-ENTMCNC: 30 PG — SIGNIFICANT CHANGE UP (ref 27–34)
MCHC RBC-ENTMCNC: 33.3 GM/DL — SIGNIFICANT CHANGE UP (ref 32–36)
MCV RBC AUTO: 90.1 FL — SIGNIFICANT CHANGE UP (ref 80–100)
NRBC # BLD: 0 /100 WBCS — SIGNIFICANT CHANGE UP (ref 0–0)
PLATELET # BLD AUTO: 262 K/UL — SIGNIFICANT CHANGE UP (ref 150–400)
POTASSIUM SERPL-MCNC: 4.1 MMOL/L — SIGNIFICANT CHANGE UP (ref 3.5–5.3)
POTASSIUM SERPL-SCNC: 4.1 MMOL/L — SIGNIFICANT CHANGE UP (ref 3.5–5.3)
RBC # BLD: 3.53 M/UL — LOW (ref 4.2–5.8)
RBC # FLD: 13.5 % — SIGNIFICANT CHANGE UP (ref 10.3–14.5)
SODIUM SERPL-SCNC: 135 MMOL/L — SIGNIFICANT CHANGE UP (ref 135–145)
WBC # BLD: 15.57 K/UL — HIGH (ref 3.8–10.5)
WBC # FLD AUTO: 15.57 K/UL — HIGH (ref 3.8–10.5)

## 2018-02-09 RX ORDER — ACETYLCYSTEINE 200 MG/ML
3 VIAL (ML) MISCELLANEOUS EVERY 6 HOURS
Qty: 0 | Refills: 0 | Status: DISCONTINUED | OUTPATIENT
Start: 2018-02-09 | End: 2018-02-09

## 2018-02-09 RX ORDER — ACETYLCYSTEINE 200 MG/ML
3 VIAL (ML) MISCELLANEOUS EVERY 6 HOURS
Qty: 0 | Refills: 0 | Status: DISCONTINUED | OUTPATIENT
Start: 2018-02-09 | End: 2018-02-13

## 2018-02-09 RX ADMIN — Medication 0.5 MILLIGRAM(S): at 17:44

## 2018-02-09 RX ADMIN — Medication 3 MILLILITER(S): at 01:41

## 2018-02-09 RX ADMIN — Medication 3 MILLILITER(S): at 17:13

## 2018-02-09 RX ADMIN — Medication 650 MILLIGRAM(S): at 00:32

## 2018-02-09 RX ADMIN — Medication 3 MILLILITER(S): at 11:43

## 2018-02-09 RX ADMIN — ATORVASTATIN CALCIUM 20 MILLIGRAM(S): 80 TABLET, FILM COATED ORAL at 22:59

## 2018-02-09 RX ADMIN — TAMSULOSIN HYDROCHLORIDE 0.4 MILLIGRAM(S): 0.4 CAPSULE ORAL at 22:59

## 2018-02-09 RX ADMIN — PANTOPRAZOLE SODIUM 40 MILLIGRAM(S): 20 TABLET, DELAYED RELEASE ORAL at 05:20

## 2018-02-09 RX ADMIN — MEROPENEM 100 MILLIGRAM(S): 1 INJECTION INTRAVENOUS at 05:17

## 2018-02-09 NOTE — PROGRESS NOTE ADULT - SUBJECTIVE AND OBJECTIVE BOX
TMAX - 98.2    On day # 28 Meropenem    Vital Signs Last 24 Hrs  T(C): 36.1 (09 Feb 2018 17:44), Max: 36.8 (09 Feb 2018 13:50)  T(F): 97 (09 Feb 2018 17:44), Max: 98.2 (09 Feb 2018 13:50)  HR: 95 (09 Feb 2018 17:44) (78 - 95)  BP: 116/62 (09 Feb 2018 17:44) (105/54 - 131/52)  BP(mean): --  RR: 17 (09 Feb 2018 13:50) (17 - 19)  SpO2: 95% (09 Feb 2018 17:44) (95% - 97%)  Supplemental O2:  on NC O2     Awake, alert, asking for water to drink, but does not want to eat now.  No c/o pain at present or SOB, and no cough noted.  Above notes appreciated - Avalos remains in place now for recurrent episode of urinary retention.      PHYSICAL EXAM  General:  awake, alert, lying in bed at present, not using his NC O2 at present, in NAD  HEENT: conj pink, sclerae anicteric, PERRLA, no oral lesions noted   Neck:  supple, no nodes noted  Heart:  RR  Lungs:  decreased BS at bases bilaterally, otherwise fairly clear now  Abdomen:  soft, BS+, nontender to palpation  Extremities:  no edema LE's                     no calf tenderness  Skin:  warm, dry, no rash noted  Avalos remains in place and draining cloudy-appearing yellow urine now      I&O's Summary :    08 Feb 2018 07:01  -  09 Feb 2018 07:00  --------------------------------------------------------  IN: 850 mL / OUT: 975 mL / NET: -125 mL      LABS:  CBC Full  -  ( 09 Feb 2018 08:15 )  WBC Count : 15.57 K/uL  Hemoglobin : 10.6 g/dL  Hematocrit : 31.8 %  Platelet Count - Automated : 262 K/uL  Mean Cell Volume : 90.1 fl  Mean Cell Hemoglobin : 30.0 pg  Mean Cell Hemoglobin Concentration : 33.3 gm/dL  Auto Neutrophil # : x  Auto Lymphocyte # : x  Auto Monocyte # : x  Auto Eosinophil # : x  Auto Basophil # : x  Auto Neutrophil % : x  Auto Lymphocyte % : x  Auto Monocyte % : x  Auto Eosinophil % : x  Auto Basophil % : x    02-09    135  |  100  |  16  ----------------------------<  98  4.1   |  28  |  0.93    Ca    8.0<L>      09 Feb 2018 08:15        Radiology:     < from: Xray Chest 1 View- PORTABLE-Routine (02.05.18 @ 07:58) >  INTERPRETATION:  AP semierect chest on February 5, 2018 at 7:09 AM.   Patient is being followed for pneumonia.    Heart is magnified by technique.    Infiltrate emanating off the left hilum is again noted and there is some   left base infiltrate with effusion again seen.    These findings are similar to February 1.    Infiltrate off the right lower hilum suggests minimal improvement.   Scarlike area in the right mid lateral lower lung field is again noted.    IMPRESSION: There may be slight improvement in right-sided infiltrate.   There is a persistent left-sided infiltrate.          Impression:  Remains afebrile on present ab rx with Meropenem with WBC's remaining between 13.36 and 16.68 over the past week, with some slight improvement reported on last CXR of 2/5.      Suggestions:  Will therefore d/c Meropenem at this time and observe temps and labs.   follow-up re: possible Suprapubic Catheter placement.

## 2018-02-09 NOTE — PROGRESS NOTE ADULT - ASSESSMENT
chronic urinary retention . Continue Avalos catheter. May need bedside percutaneous suprapubic cystostomy.

## 2018-02-09 NOTE — PROGRESS NOTE ADULT - SUBJECTIVE AND OBJECTIVE BOX
Patient seen and examined bedside resting comfortably.  Avalos draining well.    T(F): 98.2 (02-09-18 @ 13:50), Max: 98.2 (02-09-18 @ 13:50)  HR: 87 (02-09-18 @ 13:50) (72 - 94)  BP: 131/52 (02-09-18 @ 13:50) (101/53 - 131/52)  RR: 17 (02-09-18 @ 13:50) (17 - 19)  SpO2: 95% (02-09-18 @ 13:50) (95% - 99%)          PHYSICAL EXAM:  General: NAD, WDWN  Neuro:  Alert & conscious  HEENT: NCAT, EOMI, conjunctiva clear  Lung: respirations nonlabored, good inspiratory effort  Abdomen: soft, NTND.   Extremities: no pedal edema or calf tenderness noted   : uncircumcised phallus, adequate meatus. Avalos draining well.    LABS:                        10.6   15.57 )-----------( 262      ( 09 Feb 2018 08:15 )             31.8     02-09    135  |  100  |  16  ----------------------------<  98  4.1   |  28  |  0.93    Ca    8.0<L>      09 Feb 2018 08:15        I&O's Detail    08 Feb 2018 07:01  -  09 Feb 2018 07:00  --------------------------------------------------------  IN:    dextrose 5% with potassium chloride 20 mEq/L: 750 mL    Solution: 100 mL  Total IN: 850 mL    OUT:    Indwelling Catheter - Urethral: 975 mL  Total OUT: 975 mL    Total NET: -125 mL          wbc    02-09 @ 08:15    15.57    02-08 @ 07:25    13.36    02-07 @ 07:23    14.05    02-06 @ 07:22    15.46    02-05 @ 06:07    14.30    02-04 @ 06:45    15.68    02-03 @ 06:00    15.50        cr   02-09 @ 08:15   0.93    02-08 @ 07:25   0.90    02-07 @ 07:23   0.82    02-06 @ 07:22   0.83    02-05 @ 06:07   0.98    02-04 @ 06:45   0.85    02-03 @ 06:00   0.88

## 2018-02-10 LAB
ANION GAP SERPL CALC-SCNC: 9 MMOL/L — SIGNIFICANT CHANGE UP (ref 5–17)
BUN SERPL-MCNC: 18 MG/DL — SIGNIFICANT CHANGE UP (ref 7–23)
CALCIUM SERPL-MCNC: 8.1 MG/DL — LOW (ref 8.5–10.1)
CHLORIDE SERPL-SCNC: 103 MMOL/L — SIGNIFICANT CHANGE UP (ref 96–108)
CO2 SERPL-SCNC: 28 MMOL/L — SIGNIFICANT CHANGE UP (ref 22–31)
CREAT SERPL-MCNC: 0.82 MG/DL — SIGNIFICANT CHANGE UP (ref 0.5–1.3)
GLUCOSE SERPL-MCNC: 71 MG/DL — SIGNIFICANT CHANGE UP (ref 70–99)
HCT VFR BLD CALC: 29.9 % — LOW (ref 39–50)
HGB BLD-MCNC: 10.1 G/DL — LOW (ref 13–17)
MCHC RBC-ENTMCNC: 30.5 PG — SIGNIFICANT CHANGE UP (ref 27–34)
MCHC RBC-ENTMCNC: 33.8 GM/DL — SIGNIFICANT CHANGE UP (ref 32–36)
MCV RBC AUTO: 90.3 FL — SIGNIFICANT CHANGE UP (ref 80–100)
NRBC # BLD: 0 /100 WBCS — SIGNIFICANT CHANGE UP (ref 0–0)
PLATELET # BLD AUTO: 255 K/UL — SIGNIFICANT CHANGE UP (ref 150–400)
POTASSIUM SERPL-MCNC: 3.7 MMOL/L — SIGNIFICANT CHANGE UP (ref 3.5–5.3)
POTASSIUM SERPL-SCNC: 3.7 MMOL/L — SIGNIFICANT CHANGE UP (ref 3.5–5.3)
RBC # BLD: 3.31 M/UL — LOW (ref 4.2–5.8)
RBC # FLD: 13.6 % — SIGNIFICANT CHANGE UP (ref 10.3–14.5)
SODIUM SERPL-SCNC: 140 MMOL/L — SIGNIFICANT CHANGE UP (ref 135–145)
WBC # BLD: 14.61 K/UL — HIGH (ref 3.8–10.5)
WBC # FLD AUTO: 14.61 K/UL — HIGH (ref 3.8–10.5)

## 2018-02-10 RX ORDER — IPRATROPIUM/ALBUTEROL SULFATE 18-103MCG
3 AEROSOL WITH ADAPTER (GRAM) INHALATION EVERY 6 HOURS
Qty: 0 | Refills: 0 | Status: DISCONTINUED | OUTPATIENT
Start: 2018-02-10 | End: 2018-02-13

## 2018-02-10 RX ORDER — TIOTROPIUM BROMIDE 18 UG/1
1 CAPSULE ORAL; RESPIRATORY (INHALATION) DAILY
Qty: 0 | Refills: 0 | Status: DISCONTINUED | OUTPATIENT
Start: 2018-02-10 | End: 2018-02-13

## 2018-02-10 RX ORDER — MORPHINE SULFATE 50 MG/1
10 CAPSULE, EXTENDED RELEASE ORAL EVERY 4 HOURS
Qty: 0 | Refills: 0 | Status: DISCONTINUED | OUTPATIENT
Start: 2018-02-10 | End: 2018-02-13

## 2018-02-10 RX ORDER — ALBUTEROL 90 UG/1
1 AEROSOL, METERED ORAL EVERY 4 HOURS
Qty: 0 | Refills: 0 | Status: DISCONTINUED | OUTPATIENT
Start: 2018-02-10 | End: 2018-02-13

## 2018-02-10 RX ADMIN — Medication 3 MILLILITER(S): at 12:06

## 2018-02-10 RX ADMIN — HALOPERIDOL DECANOATE 0.25 MILLIGRAM(S): 100 INJECTION INTRAMUSCULAR at 17:14

## 2018-02-10 RX ADMIN — Medication 200 MILLIGRAM(S): at 13:15

## 2018-02-10 RX ADMIN — Medication 3 MILLILITER(S): at 06:05

## 2018-02-10 RX ADMIN — Medication 1 TABLET(S): at 17:14

## 2018-02-10 RX ADMIN — Medication 81 MILLIGRAM(S): at 11:18

## 2018-02-10 RX ADMIN — Medication 20 MILLIEQUIVALENT(S): at 11:18

## 2018-02-10 RX ADMIN — Medication 3 MILLILITER(S): at 00:05

## 2018-02-10 RX ADMIN — Medication 0.5 MILLIGRAM(S): at 06:04

## 2018-02-10 NOTE — CHART NOTE - NSCHARTNOTEFT_GEN_A_CORE
Assessment:     89 y.o. male discharged from hospital (12/27); c PNA ; sent to rehab; admitted from there d/t SOB + flu  Pt lethargic, confused, arousable at times, minimally verbal  Poor PO intake continues; MD advised family re poor prognosis; family agrees to hospice eval due to FTT, advanced age; eval pending.   MOLST form completed; DNR/DNI; no feeding tube to be initiated.     Factors impacting intake: [ ] none [ ] nausea  [ ] vomiting [ ] diarrhea [ ] constipation  [ ]chewing problems [ ] swallowing issues  [X ] other: AMS, inability to self-feed; persistent lack of appetite     Diet Prescription: Diet, Dysphagia 3 Soft-Nectar Consistency Fluid:   Supplement Feeding Modality:  Oral  Ensure Pudding Cans or Servings Per Day:  1       Frequency:  Three Times a day (02-06-18 @ 16:13)    Intake: total feeding assistance required; pt continues to refuse most meals; 0-25% most meals; no substantial consumed    Current Weight: 65.8 kg (2/9); previous wt 69 kg (2/6); admission wt 74.8 (1/11)  % Weight Change: 5% loss x 3 days; total wt loss since admission 9 kg (12%) x 1 month    Pertinent Medications: MEDICATIONS  (STANDING):  ALBUTerol/ipratropium for Nebulization 3 milliLiter(s) Nebulizer every 6 hours  aspirin enteric coated 81 milliGRAM(s) Oral daily  ATENolol  Tablet 50 milliGRAM(s) Oral daily  buDESOnide   0.5 milliGRAM(s) Respule 0.5 milliGRAM(s) Inhalation every 12 hours  dextrose 5% with potassium chloride 20 mEq/L 1000 milliLiter(s) (50 mL/Hr) IV Continuous <Continuous>  furosemide    Tablet 40 milliGRAM(s) Oral daily  guaiFENesin    Syrup 200 milliGRAM(s) Oral every 4 hours  haloperidol     Tablet 0.25 milliGRAM(s) Oral two times a day  lactobacillus acidophilus 1 Tablet(s) Oral every 12 hours  pantoprazole  Injectable 40 milliGRAM(s) IV Push every 12 hours  potassium chloride    Tablet ER 20 milliEquivalent(s) Oral daily  tamsulosin 0.4 milliGRAM(s) Oral at bedtime    MEDICATIONS  (PRN):  acetaminophen   Tablet. 650 milliGRAM(s) Oral every 6 hours PRN Moderate Pain (4 - 6)  acetaminophen  Suppository 650 milliGRAM(s) Rectal every 6 hours PRN For Temp greater than 38 C (100.4 F)  acetylcysteine 20% Inhalation 3 milliLiter(s) Inhalation every 6 hours PRN cough with mucous  morphine Concentrate 10 milliGRAM(s) SubLingual every 4 hours PRN Mild Pain (1 - 3)    Pertinent Labs: 02-10 Na140 mmol/L Glu 71 mg/dL K+ 3.7 mmol/L Cr  0.82 mg/dL BUN 18 mg/dL Phos n/a   Alb n/a   PAB n/a        CAPILLARY BLOOD GLUCOSE    Skin: Stage II pressure ulcer on sacrum; no edema noted  + fecal & urinary incontinence; + Avalos    Estimated Needs:   [ X] no change since previous assessment  [ ] recalculated:     Previous Nutrition Diagnosis:   [ ] Inadequate Energy Intake [ ]Inadequate Oral Intake [ ] Excessive Energy Intake   [ ] Underweight [ ] Increased Nutrient Needs [ ] Overweight/Obesity   [ ] Altered GI Function [ ] Unintended Weight Loss [ ] Food & Nutrition Related Knowledge Deficit   [X ] Severe  Malnutrition in context of acute illness    Nutrition Diagnosis is [X ] ongoing  [ ] resolved [ ] not applicable     Nutrition focused physical exam conducted:    Subcutaneous fat loss: [severe ] Orbital fat pads region, [unable ]Buccal fat region, [unable ]Triceps region,  [severe ]Ribs region.   Muscle wasting: [ severe]Temples region, [severe ]Clavicle region, [severe ]Shoulder region, [unable ]Scapula region, [severe ]Interosseous region,  [moderate ]thigh region, [severe ]Calf region    New Nutrition Diagnosis: [X ] not applicable       Interventions:   Recommend  [X ] Change Diet To:  Dysphagia 1 - pureed consistency c nectar-thick liquids  [X ] Nutrition Supplement: Ensure Pudding x 3/day (provides 510 kcal, 12 g protein)   [ ] Nutrition Support  [ ] Other:     Monitoring and Evaluation:   [X ] PO intake [ x ] Tolerance to diet prescription [ x ] weights [ x ] labs[ x ] follow up per protocol  [ ] other:

## 2018-02-10 NOTE — PROGRESS NOTE ADULT - SUBJECTIVE AND OBJECTIVE BOX
pt not eating  lethargic  Off AB  < IV rate  d/c lipitor  Roxanol 10mg prn q 4h  Awitng hospice  Prognosis poor

## 2018-02-10 NOTE — PROGRESS NOTE ADULT - SUBJECTIVE AND OBJECTIVE BOX
Patient seen and examined bedside resting comfortably.  Patient lethargic in bed, but arousable.   Ortiz in place. No urinary complaints.   Afebrile overnight.    T(F): 97.1 (02-10-18 @ 17:09), Max: 98 (02-10-18 @ 11:57)  HR: 86 (02-10-18 @ 17:09) (68 - 96)  BP: 115/52 (02-10-18 @ 17:09) (104/68 - 137/49)  RR: 17 (02-10-18 @ 17:09) (16 - 17)  SpO2: 98% (02-10-18 @ 17:09) (95% - 100%)    PHYSICAL EXAM:  General: NAD, WDWN  Neuro:  Lethargic. Arousable to voice and painful stimuli.  CV: +S1+S2 regular rate and rhythm  Lung: clear to ausculation bilaterally, respirations nonlabored, good inspiratory effort  Abdomen: soft, NTND. Normoactive BS  : ortiz in situ draining clear yellow urine. No suprapubic tenderness. I: [id number]  Extremities: no pedal edema or calf tenderness noted. 2+ distal pulses b/l    LABS:                        10.1   14.61 )-----------( 255      ( 10 Feb 2018 06:55 )             29.9     02-10    140  |  103  |  18  ----------------------------<  71  3.7   |  28  |  0.82    Ca    8.1<L>      10 Feb 2018 06:55    Impression: 89y Male with PMH Paroxysmal atrial fibrillation, Gout, Pneumonia, Elevated cholesterol, and HTN. Admitted with multifocal pneumonia and urinary retention requiring ortiz catheter.    Plan:  -poor prognosis, awaiting hospice evaluation  -Continue ortiz catheter with I+O monitoring  -abx dc'ed per ID  -discuss with Dr. Pena

## 2018-02-11 LAB
ANION GAP SERPL CALC-SCNC: 9 MMOL/L — SIGNIFICANT CHANGE UP (ref 5–17)
BUN SERPL-MCNC: 22 MG/DL — SIGNIFICANT CHANGE UP (ref 7–23)
CALCIUM SERPL-MCNC: 8.3 MG/DL — LOW (ref 8.5–10.1)
CHLORIDE SERPL-SCNC: 104 MMOL/L — SIGNIFICANT CHANGE UP (ref 96–108)
CO2 SERPL-SCNC: 27 MMOL/L — SIGNIFICANT CHANGE UP (ref 22–31)
CREAT SERPL-MCNC: 0.82 MG/DL — SIGNIFICANT CHANGE UP (ref 0.5–1.3)
GLUCOSE SERPL-MCNC: 58 MG/DL — LOW (ref 70–99)
HCT VFR BLD CALC: 31.3 % — LOW (ref 39–50)
HGB BLD-MCNC: 10.2 G/DL — LOW (ref 13–17)
MCHC RBC-ENTMCNC: 29.9 PG — SIGNIFICANT CHANGE UP (ref 27–34)
MCHC RBC-ENTMCNC: 32.6 GM/DL — SIGNIFICANT CHANGE UP (ref 32–36)
MCV RBC AUTO: 91.8 FL — SIGNIFICANT CHANGE UP (ref 80–100)
NRBC # BLD: 0 /100 WBCS — SIGNIFICANT CHANGE UP (ref 0–0)
PLATELET # BLD AUTO: 283 K/UL — SIGNIFICANT CHANGE UP (ref 150–400)
POTASSIUM SERPL-MCNC: 4.1 MMOL/L — SIGNIFICANT CHANGE UP (ref 3.5–5.3)
POTASSIUM SERPL-SCNC: 4.1 MMOL/L — SIGNIFICANT CHANGE UP (ref 3.5–5.3)
RBC # BLD: 3.41 M/UL — LOW (ref 4.2–5.8)
RBC # FLD: 13.6 % — SIGNIFICANT CHANGE UP (ref 10.3–14.5)
SODIUM SERPL-SCNC: 140 MMOL/L — SIGNIFICANT CHANGE UP (ref 135–145)
WBC # BLD: 15.61 K/UL — HIGH (ref 3.8–10.5)
WBC # FLD AUTO: 15.61 K/UL — HIGH (ref 3.8–10.5)

## 2018-02-11 RX ADMIN — HALOPERIDOL DECANOATE 0.25 MILLIGRAM(S): 100 INJECTION INTRAMUSCULAR at 17:18

## 2018-02-11 RX ADMIN — Medication 81 MILLIGRAM(S): at 11:12

## 2018-02-11 RX ADMIN — Medication 200 MILLIGRAM(S): at 10:48

## 2018-02-11 RX ADMIN — Medication 1 TABLET(S): at 17:19

## 2018-02-11 NOTE — PROGRESS NOTE ADULT - SUBJECTIVE AND OBJECTIVE BOX
Patient is a 89y old  Male who presents with a chief complaint of SOB, cough (13 Jan 2018 09:33)      INTERVAL HPI/OVERNIGHT EVENTS: more alert, O x 2 , no distress "when am I going home?"    MEDICATIONS  (STANDING):  ALBUTerol    90 MICROgram(s) HFA Inhaler 1 Puff(s) Inhalation every 4 hours  aspirin enteric coated 81 milliGRAM(s) Oral daily  ATENolol  Tablet 50 milliGRAM(s) Oral daily  dextrose 5% with potassium chloride 20 mEq/L 1000 milliLiter(s) (50 mL/Hr) IV Continuous <Continuous>  furosemide    Tablet 40 milliGRAM(s) Oral daily  guaiFENesin    Syrup 200 milliGRAM(s) Oral every 4 hours  haloperidol     Tablet 0.25 milliGRAM(s) Oral two times a day  lactobacillus acidophilus 1 Tablet(s) Oral every 12 hours  pantoprazole  Injectable 40 milliGRAM(s) IV Push every 12 hours  potassium chloride    Tablet ER 20 milliEquivalent(s) Oral daily  tamsulosin 0.4 milliGRAM(s) Oral at bedtime  tiotropium 18 MICROgram(s) Capsule 1 Capsule(s) Inhalation daily    MEDICATIONS  (PRN):  acetaminophen   Tablet. 650 milliGRAM(s) Oral every 6 hours PRN Moderate Pain (4 - 6)  acetaminophen  Suppository 650 milliGRAM(s) Rectal every 6 hours PRN For Temp greater than 38 C (100.4 F)  acetylcysteine 20% Inhalation 3 milliLiter(s) Inhalation every 6 hours PRN cough with mucous  ALBUTerol/ipratropium for Nebulization 3 milliLiter(s) Nebulizer every 6 hours PRN Shortness of Breath and/or Wheezing  morphine Concentrate 10 milliGRAM(s) SubLingual every 4 hours PRN Mild Pain (1 - 3)      Allergies    aspirin (Stomach Upset)    Intolerances        REVIEW OF SYSTEMS:    comfrrtable  No CP  No SOB  pt is not eating at all    Vital Signs Last 24 Hrs  T(C): 36.2 (11 Feb 2018 05:34), Max: 36.7 (10 Feb 2018 11:57)  T(F): 97.2 (11 Feb 2018 05:34), Max: 98 (10 Feb 2018 11:57)  HR: 104 (11 Feb 2018 05:34) (84 - 105)  BP: 112/58 (11 Feb 2018 05:34) (112/58 - 137/49)  BP(mean): --  RR: 18 (11 Feb 2018 05:34) (16 - 18)  SpO2: 99% (11 Feb 2018 05:34) (97% - 99%)    PHYSICAL EXAM:  general       HEENT wnl  CHEST/LUNG: Clear to percussion bilaterally; No rales, rhonchi, wheezing, or rubs  HEART: Regular rate and rhythm; No murmurs, rubs, or gallops  ABDOMEN: Soft, Nontender, Nondistended; Bowel sounds present  EXTREMITIES:  2+ Peripheral Pulses, No clubbing, cyanosis, or edema  LYMPH: No lymphadenopathy noted  SKIN: No rashes or lesions  Avalos in place    LABS:                        10.2   15.61 )-----------( 283      ( 11 Feb 2018 07:05 )             31.3     02-11    140  |  104  |  22  ----------------------------<  58<L>  4.1   |  27  |  0.82    Ca    8.3<L>      11 Feb 2018 07:05          CAPILLARY BLOOD GLUCOSE                    RADIOLOGY & ADDITIONAL TESTS:    Imaging Personally Reviewed:  [y ] YES  [ ] NO    Consultant(s) Notes Reviewed:  [y ] YES  [ ] NO    Care Discussed with Consultants/Other Providers [ ] YES  [ ] NO    PROBLEMS:  PNEUMONIA, SEPSIS  DIFFICULTY BREATHING  Gram negative sepsis  Pneumonia  Delirium due to another medical condition, acute, mixed level of activity  FTT    Care discussed with family,         [  v]   yes  [  ]  No    imp:    stable[ ]    unstable[  ]     improving [   ]       unchanged  [v  ]                Plans:  PT  OOB to chair  Prognosis guarded

## 2018-02-12 LAB
ALBUMIN SERPL ELPH-MCNC: 2 G/DL — LOW (ref 3.3–5)
ALP SERPL-CCNC: 110 U/L — SIGNIFICANT CHANGE UP (ref 40–120)
ALT FLD-CCNC: 9 U/L — LOW (ref 12–78)
ANION GAP SERPL CALC-SCNC: 11 MMOL/L — SIGNIFICANT CHANGE UP (ref 5–17)
AST SERPL-CCNC: 18 U/L — SIGNIFICANT CHANGE UP (ref 15–37)
BILIRUB SERPL-MCNC: 1.2 MG/DL — SIGNIFICANT CHANGE UP (ref 0.2–1.2)
BUN SERPL-MCNC: 29 MG/DL — HIGH (ref 7–23)
CALCIUM SERPL-MCNC: 8.1 MG/DL — LOW (ref 8.5–10.1)
CHLORIDE SERPL-SCNC: 106 MMOL/L — SIGNIFICANT CHANGE UP (ref 96–108)
CO2 SERPL-SCNC: 25 MMOL/L — SIGNIFICANT CHANGE UP (ref 22–31)
CREAT SERPL-MCNC: 0.85 MG/DL — SIGNIFICANT CHANGE UP (ref 0.5–1.3)
GLUCOSE SERPL-MCNC: 49 MG/DL — LOW (ref 70–99)
HCT VFR BLD CALC: 31.7 % — LOW (ref 39–50)
HGB BLD-MCNC: 10.3 G/DL — LOW (ref 13–17)
MCHC RBC-ENTMCNC: 29.8 PG — SIGNIFICANT CHANGE UP (ref 27–34)
MCHC RBC-ENTMCNC: 32.5 GM/DL — SIGNIFICANT CHANGE UP (ref 32–36)
MCV RBC AUTO: 91.6 FL — SIGNIFICANT CHANGE UP (ref 80–100)
NRBC # BLD: 0 /100 WBCS — SIGNIFICANT CHANGE UP (ref 0–0)
PLATELET # BLD AUTO: 280 K/UL — SIGNIFICANT CHANGE UP (ref 150–400)
POTASSIUM SERPL-MCNC: 3.9 MMOL/L — SIGNIFICANT CHANGE UP (ref 3.5–5.3)
POTASSIUM SERPL-SCNC: 3.9 MMOL/L — SIGNIFICANT CHANGE UP (ref 3.5–5.3)
PREALB SERPL-MCNC: 6 MG/DL — LOW (ref 18–45)
PROT SERPL-MCNC: 6.2 GM/DL — SIGNIFICANT CHANGE UP (ref 6–8.3)
RBC # BLD: 3.46 M/UL — LOW (ref 4.2–5.8)
RBC # FLD: 13.5 % — SIGNIFICANT CHANGE UP (ref 10.3–14.5)
SODIUM SERPL-SCNC: 142 MMOL/L — SIGNIFICANT CHANGE UP (ref 135–145)
WBC # BLD: 12.99 K/UL — HIGH (ref 3.8–10.5)
WBC # FLD AUTO: 12.99 K/UL — HIGH (ref 3.8–10.5)

## 2018-02-12 RX ORDER — IPRATROPIUM/ALBUTEROL SULFATE 18-103MCG
3 AEROSOL WITH ADAPTER (GRAM) INHALATION
Qty: 0 | Refills: 0 | COMMUNITY
Start: 2018-02-12

## 2018-02-12 RX ORDER — ACETAMINOPHEN 500 MG
0 TABLET ORAL
Qty: 0 | Refills: 0 | COMMUNITY

## 2018-02-12 RX ORDER — TIOTROPIUM BROMIDE 18 UG/1
1 CAPSULE ORAL; RESPIRATORY (INHALATION)
Qty: 0 | Refills: 0 | COMMUNITY
Start: 2018-02-12

## 2018-02-12 RX ORDER — ALLOPURINOL 300 MG
1 TABLET ORAL
Qty: 0 | Refills: 0 | COMMUNITY

## 2018-02-12 RX ORDER — ACETAMINOPHEN 500 MG
1 TABLET ORAL
Qty: 0 | Refills: 0 | COMMUNITY
Start: 2018-02-12

## 2018-02-12 RX ORDER — TAMSULOSIN HYDROCHLORIDE 0.4 MG/1
1 CAPSULE ORAL
Qty: 0 | Refills: 0 | COMMUNITY
Start: 2018-02-12

## 2018-02-12 RX ORDER — MORPHINE SULFATE 50 MG/1
10 CAPSULE, EXTENDED RELEASE ORAL
Qty: 0 | Refills: 0 | COMMUNITY
Start: 2018-02-12

## 2018-02-12 RX ORDER — ACETAMINOPHEN 500 MG
2 TABLET ORAL
Qty: 0 | Refills: 0 | COMMUNITY
Start: 2018-02-12

## 2018-02-12 RX ORDER — FUROSEMIDE 40 MG
1 TABLET ORAL
Qty: 0 | Refills: 0 | COMMUNITY
Start: 2018-02-12

## 2018-02-12 RX ORDER — ATENOLOL 25 MG/1
1 TABLET ORAL
Qty: 0 | Refills: 0 | COMMUNITY

## 2018-02-12 RX ORDER — ATORVASTATIN CALCIUM 80 MG/1
1 TABLET, FILM COATED ORAL
Qty: 0 | Refills: 0 | COMMUNITY

## 2018-02-12 RX ORDER — PIPERACILLIN AND TAZOBACTAM 4; .5 G/20ML; G/20ML
0 INJECTION, POWDER, LYOPHILIZED, FOR SOLUTION INTRAVENOUS
Qty: 0 | Refills: 0 | COMMUNITY

## 2018-02-12 RX ORDER — ASPIRIN/CALCIUM CARB/MAGNESIUM 324 MG
1 TABLET ORAL
Qty: 0 | Refills: 0 | COMMUNITY
Start: 2018-02-12

## 2018-02-12 RX ORDER — VANCOMYCIN HCL 1 G
0 VIAL (EA) INTRAVENOUS
Qty: 0 | Refills: 0 | COMMUNITY

## 2018-02-12 RX ORDER — POTASSIUM CHLORIDE 20 MEQ
1 PACKET (EA) ORAL
Qty: 0 | Refills: 0 | COMMUNITY
Start: 2018-02-12

## 2018-02-12 RX ORDER — ATENOLOL 25 MG/1
1 TABLET ORAL
Qty: 0 | Refills: 0 | COMMUNITY
Start: 2018-02-12

## 2018-02-12 RX ORDER — ALBUTEROL 90 UG/1
1 AEROSOL, METERED ORAL
Qty: 0 | Refills: 0 | COMMUNITY
Start: 2018-02-12

## 2018-02-12 RX ORDER — IPRATROPIUM/ALBUTEROL SULFATE 18-103MCG
3 AEROSOL WITH ADAPTER (GRAM) INHALATION
Qty: 0 | Refills: 0 | COMMUNITY

## 2018-02-12 NOTE — PROGRESS NOTE ADULT - SUBJECTIVE AND OBJECTIVE BOX
HPI:  89 y/o white male with hx Paroxysmal A Fib, HTN, Increased Cholesterol, Gout, s/p Rt Cataract Surgery, admitted today via the ER from [facility] with SOB initially requiring NRB O2 and found to be febrile to 101.7 with wheezing and rhonchi on physical exam.  W/u with Rapid Influenza testing was done and now reported Positive for Influenza A and 2 Blood Cx's and Urine Cx were obtained and pending.  CXR was done and reported now with a RLL Infiltrate along with trace Pleural effusion.  Patient is originally from his own home and was admitted to [facility] from 12/18 to 12/22 during which time he was rx'ed for Multifocal Rt PNA with Zosyn and Vanco and then d/c'ed to [facility] on po Augmentin.  Patient was again seen in the ER on 1/6 after he apparently tripped on a telephone wire while at [facility] and sustained a laceration to the Lt Scalp which required sutures and patient was then d/c'ed  back to [facility].  Patient now c/o cough with only intermittent sputum production since yesterday along with increased SOB and fatigue.  No c/o cp, no abdominal pain, no N/V but c/o soft stools which he cannot always control.  No c/o dysuria or hesitancy, and claims his appetite is fair.   Patient was begun empirically on Zosyn and Vanco 2 days prior to ER but got > SOB (13 Jan 2018 09:33)  HPI:        SYMPTOMS---	                   DIDILITY    OTHER REVIEW OF SYSTEMS:  UNABLE TO OBTAIN  due to: SEDATION, CONFUSION    Baseline ADLs (prior to admission):  Independent [ ] moderately [ ] fully   Dependent   [ ] moderately [ ]fully    	                 T(C): 37.1 (02-12-18 @ 11:57), Max: 37.1 (02-12-18 @ 11:57)  T(F): 98.7 (02-12-18 @ 11:57), Max: 98.7 (02-12-18 @ 11:57)  HR: 90 (02-12-18 @ 11:57) (90 - 100)  BP: 134/51 (02-12-18 @ 11:57) (115/56 - 134/51)  RR: 18 (02-12-18 @ 11:57) (17 - 18)  SpO2: 96% (02-12-18 @ 11:57) (94% - 98%)  Wt(kg): --      GENERAL:    EYES : non icteric :               Other:     HEENT:      	atraumatic, normocephalic, PEERLA, sclera anicteric, dry oral mucosa   NECK:          Trach:        Vent:  CVS:          Tachypnic:               Bradycardic:              Normal:		   RESP:        tachypnic:                Dyspenic :                  Comfortable:	  GI:          NGT/PEG 	  :      ortiz      	  MUSC:       	Normal	Weakness	  Edema	   NEURO:     	No focal deficit	  Focal  PSYCH:           Alert	Oriented	  Lethargic     SKIN:         	Normal	  Other  LYMPH:      	Normal	  Other  	                     ALLERGIES: aspirin (Stomach Upset)    MEDICATIONS  (STANDING):  ALBUTerol    90 MICROgram(s) HFA Inhaler 1 Puff(s) Inhalation every 4 hours  aspirin enteric coated 81 milliGRAM(s) Oral daily  ATENolol  Tablet 50 milliGRAM(s) Oral daily  dextrose 5% with potassium chloride 20 mEq/L 1000 milliLiter(s) (50 mL/Hr) IV Continuous <Continuous>  furosemide    Tablet 40 milliGRAM(s) Oral daily  guaiFENesin    Syrup 200 milliGRAM(s) Oral every 4 hours  haloperidol     Tablet 0.25 milliGRAM(s) Oral two times a day  lactobacillus acidophilus 1 Tablet(s) Oral every 12 hours  pantoprazole  Injectable 40 milliGRAM(s) IV Push every 12 hours  potassium chloride    Tablet ER 20 milliEquivalent(s) Oral daily  tamsulosin 0.4 milliGRAM(s) Oral at bedtime  tiotropium 18 MICROgram(s) Capsule 1 Capsule(s) Inhalation daily    MEDICATIONS  (PRN):  acetaminophen   Tablet. 650 milliGRAM(s) Oral every 6 hours PRN Moderate Pain (4 - 6)  acetaminophen  Suppository 650 milliGRAM(s) Rectal every 6 hours PRN For Temp greater than 38 C (100.4 F)  acetylcysteine 20% Inhalation 3 milliLiter(s) Inhalation every 6 hours PRN cough with mucous  ALBUTerol/ipratropium for Nebulization 3 milliLiter(s) Nebulizer every 6 hours PRN Shortness of Breath and/or Wheezing  morphine Concentrate 10 milliGRAM(s) SubLingual every 4 hours PRN Mild Pain (1 - 3)    	                   LABS REVIEWED    H/H: 10.3/31.7   02-12 @ 07:17    BUN/CR: 29/0.85  02-12 @ 07:17    Albumin: 2.0  02-12 @ 07:17    CRP/SED RATE: --/-- 02-12 @ 07:17    Sodium: 142  02-12 @ 07:17                  	  ADVANCED DIRECTIVES:   FULL CODE [   ]  DNR [xx  ]    DNI [  x]     MOLST [ x ]  PSYCHOSOCIAL-SPIRITUAL ASSESSMENT:       Reviewed       GOALS OF CARE DISCUSSION       Palliative care info/counseling provided	           Family meeting       Advanced Directives addressed	           See previous Palliative Medicine Note  	        REFERRALS	        Palliative Med        Unit SW/Case Mgmt              Speech/Swallow        Hospice             Nutrition         Functional Assessment: KPS:  <20             PPS: v poor       FINAL IMPRESSION AND RECOMMENDATIONS: adv age   sx controlled   dnr/dni  cont current care  prognosis poor

## 2018-02-12 NOTE — PROGRESS NOTE ADULT - SUBJECTIVE AND OBJECTIVE BOX
Patient seen and examined at bedside.  Agitated.    T(F): 98.7 (02-12-18 @ 11:57), Max: 98.7 (02-12-18 @ 11:57)  HR: 90 (02-12-18 @ 11:57) (90 - 100)  BP: 134/51 (02-12-18 @ 11:57) (115/56 - 134/51)  RR: 18 (02-12-18 @ 11:57) (17 - 18)  SpO2: 96% (02-12-18 @ 11:57) (94% - 98%)    PHYSICAL EXAM:  General: Awake, agitated, demented  HEENT: NC in place  CV: +S1S2 regular rate and rhythm  Lung: Respirations nonlabored  Abdomen: Soft, NTND  Extremities: No pedal edema or calf tenderness noted   : Ortiz catheter in place draining clear, yellow urine, output: 725cc/24hrs. No suprapubic tenderness    LABS:                        10.3   12.99 )-----------( 280      ( 12 Feb 2018 07:17 )             31.7     02-12    142  |  106  |  29<H>  ----------------------------<  49<L>  3.9   |  25  |  0.85    Ca    8.1<L>      12 Feb 2018 07:17    TPro  6.2  /  Alb  2.0<L>  /  TBili  1.2  /  DBili  x   /  AST  18  /  ALT  9<L>  /  AlkPhos  110  02-12      Impression: 89y Male with PMH Paroxysmal atrial fibrillation, Gout, Pneumonia, Elevated cholesterol, and HTN. Admitted with multifocal pneumonia and urinary retention requiring ortiz catheter    Plan:  - continue ortiz, monitor UOP  - monitor renal function  - continue flomax  - possible TOV in near future, may need SPC if fails  - abx per medicine   - continue medical management

## 2018-02-12 NOTE — PROGRESS NOTE ADULT - SUBJECTIVE AND OBJECTIVE BOX
Patient is a 89y old  Male who presents with a chief complaint of SOB, cough (13 Jan 2018 09:33)      INTERVAL HPI/OVERNIGHT EVENTS: improved strength endurance, mobility    MEDICATIONS  (STANDING):  ALBUTerol    90 MICROgram(s) HFA Inhaler 1 Puff(s) Inhalation every 4 hours  aspirin enteric coated 81 milliGRAM(s) Oral daily  ATENolol  Tablet 50 milliGRAM(s) Oral daily  dextrose 5% with potassium chloride 20 mEq/L 1000 milliLiter(s) (50 mL/Hr) IV Continuous <Continuous>  furosemide    Tablet 40 milliGRAM(s) Oral daily  guaiFENesin    Syrup 200 milliGRAM(s) Oral every 4 hours  haloperidol     Tablet 0.25 milliGRAM(s) Oral two times a day  lactobacillus acidophilus 1 Tablet(s) Oral every 12 hours  pantoprazole  Injectable 40 milliGRAM(s) IV Push every 12 hours  potassium chloride    Tablet ER 20 milliEquivalent(s) Oral daily  tamsulosin 0.4 milliGRAM(s) Oral at bedtime  tiotropium 18 MICROgram(s) Capsule 1 Capsule(s) Inhalation daily    MEDICATIONS  (PRN):  acetaminophen   Tablet. 650 milliGRAM(s) Oral every 6 hours PRN Moderate Pain (4 - 6)  acetaminophen  Suppository 650 milliGRAM(s) Rectal every 6 hours PRN For Temp greater than 38 C (100.4 F)  acetylcysteine 20% Inhalation 3 milliLiter(s) Inhalation every 6 hours PRN cough with mucous  ALBUTerol/ipratropium for Nebulization 3 milliLiter(s) Nebulizer every 6 hours PRN Shortness of Breath and/or Wheezing  morphine Concentrate 10 milliGRAM(s) SubLingual every 4 hours PRN Mild Pain (1 - 3)      Allergies    aspirin (Stomach Upset)    Intolerances        REVIEW OF SYSTEMS:  no CP, no SOB, No GI/  Still minimal food intake      Vital Signs Last 24 Hrs  T(C): 36.1 (12 Feb 2018 04:45), Max: 36.9 (11 Feb 2018 11:20)  T(F): 97 (12 Feb 2018 04:45), Max: 98.4 (11 Feb 2018 11:20)  HR: 100 (12 Feb 2018 04:45) (89 - 100)  BP: 115/56 (12 Feb 2018 04:45) (113/62 - 117/75)  BP(mean): --  RR: 17 (12 Feb 2018 04:45) (16 - 17)  SpO2: 94% (12 Feb 2018 04:45) (94% - 98%)    PHYSICAL EXAM:  general       HEENT wnl  CHEST/LUNG: Clear to percussion bilaterally; No rales, rhonchi, wheezing, or rubs  HEART: Regular rate and rhythm; No murmurs, rubs, or gallops  ABDOMEN: Soft, Nontender, Nondistended; Bowel sounds present  EXTREMITIES:  2+ Peripheral Pulses, No clubbing, cyanosis, or edema  LYMPH: No lymphadenopathy noted  SKIN: No rashes or lesions    LABS:                        10.3   12.99 )-----------( 280      ( 12 Feb 2018 07:17 )             31.7     02-12    142  |  106  |  29<H>  ----------------------------<  49<L>  3.9   |  25  |  0.85    Ca    8.1<L>      12 Feb 2018 07:17    TPro  6.2  /  Alb  2.0<L>  /  TBili  1.2  /  DBili  x   /  AST  18  /  ALT  9<L>  /  AlkPhos  110  02-12        CAPILLARY BLOOD GLUCOSE                    RADIOLOGY & ADDITIONAL TESTS:    Imaging Personally Reviewed:  [y ] YES  [ ] NO    Consultant(s) Notes Reviewed:  [y ] YES  [ ] NO    Care Discussed with Consultants/Other Providers [ ] YES  [ ] NO    PROBLEMS:  PNEUMONIA, SEPSIS  DIFFICULTY BREATHING  Gram negative sepsis  Pneumonia  Delirium due to another medical condition, acute, mixed level of activity  FTT      Care discussed with family,         [  ]   yes  [  ]  No    imp:    stable[ ]    unstable[  ]     improving [ v  ]       unchanged  [  ]                Plans:  PT reevaluation

## 2018-02-13 VITALS
HEART RATE: 77 BPM | SYSTOLIC BLOOD PRESSURE: 144 MMHG | DIASTOLIC BLOOD PRESSURE: 52 MMHG | RESPIRATION RATE: 19 BRPM | OXYGEN SATURATION: 99 %

## 2018-02-13 LAB
ANION GAP SERPL CALC-SCNC: 13 MMOL/L — SIGNIFICANT CHANGE UP (ref 5–17)
APPEARANCE UR: ABNORMAL
BACTERIA # UR AUTO: ABNORMAL
BILIRUB UR-MCNC: NEGATIVE — SIGNIFICANT CHANGE UP
BUN SERPL-MCNC: 35 MG/DL — HIGH (ref 7–23)
CALCIUM SERPL-MCNC: 8.6 MG/DL — SIGNIFICANT CHANGE UP (ref 8.5–10.1)
CHLORIDE SERPL-SCNC: 107 MMOL/L — SIGNIFICANT CHANGE UP (ref 96–108)
CO2 SERPL-SCNC: 23 MMOL/L — SIGNIFICANT CHANGE UP (ref 22–31)
COLOR SPEC: YELLOW — SIGNIFICANT CHANGE UP
CREAT SERPL-MCNC: 0.84 MG/DL — SIGNIFICANT CHANGE UP (ref 0.5–1.3)
DIFF PNL FLD: ABNORMAL
EPI CELLS # UR: SIGNIFICANT CHANGE UP
GLUCOSE SERPL-MCNC: 46 MG/DL — LOW (ref 70–99)
GLUCOSE UR QL: NEGATIVE MG/DL — SIGNIFICANT CHANGE UP
HCT VFR BLD CALC: 34.3 % — LOW (ref 39–50)
HGB BLD-MCNC: 11.2 G/DL — LOW (ref 13–17)
KETONES UR-MCNC: ABNORMAL
LEUKOCYTE ESTERASE UR-ACNC: ABNORMAL
MCHC RBC-ENTMCNC: 30.5 PG — SIGNIFICANT CHANGE UP (ref 27–34)
MCHC RBC-ENTMCNC: 32.7 GM/DL — SIGNIFICANT CHANGE UP (ref 32–36)
MCV RBC AUTO: 93.5 FL — SIGNIFICANT CHANGE UP (ref 80–100)
NITRITE UR-MCNC: NEGATIVE — SIGNIFICANT CHANGE UP
NRBC # BLD: 0 /100 WBCS — SIGNIFICANT CHANGE UP (ref 0–0)
PH UR: 5 — SIGNIFICANT CHANGE UP (ref 5–8)
PLATELET # BLD AUTO: 291 K/UL — SIGNIFICANT CHANGE UP (ref 150–400)
POTASSIUM SERPL-MCNC: 3.9 MMOL/L — SIGNIFICANT CHANGE UP (ref 3.5–5.3)
POTASSIUM SERPL-SCNC: 3.9 MMOL/L — SIGNIFICANT CHANGE UP (ref 3.5–5.3)
PROT UR-MCNC: 100 MG/DL
RBC # BLD: 3.67 M/UL — LOW (ref 4.2–5.8)
RBC # FLD: 13.8 % — SIGNIFICANT CHANGE UP (ref 10.3–14.5)
RBC CASTS # UR COMP ASSIST: ABNORMAL /HPF (ref 0–4)
SODIUM SERPL-SCNC: 143 MMOL/L — SIGNIFICANT CHANGE UP (ref 135–145)
SP GR SPEC: 1.02 — SIGNIFICANT CHANGE UP (ref 1.01–1.02)
UROBILINOGEN FLD QL: NEGATIVE MG/DL — SIGNIFICANT CHANGE UP
WBC # BLD: 17.48 K/UL — HIGH (ref 3.8–10.5)
WBC # FLD AUTO: 17.48 K/UL — HIGH (ref 3.8–10.5)
WBC UR QL: ABNORMAL

## 2018-02-13 RX ORDER — SODIUM CHLORIDE 9 MG/ML
1000 INJECTION, SOLUTION INTRAVENOUS
Qty: 0 | Refills: 0 | Status: DISCONTINUED | OUTPATIENT
Start: 2018-02-13 | End: 2018-02-13

## 2018-02-13 RX ADMIN — DEXTROSE MONOHYDRATE, SODIUM CHLORIDE, AND POTASSIUM CHLORIDE 50 MILLILITER(S): 50; .745; 4.5 INJECTION, SOLUTION INTRAVENOUS at 05:47

## 2018-02-13 RX ADMIN — SODIUM CHLORIDE 75 MILLILITER(S): 9 INJECTION, SOLUTION INTRAVENOUS at 11:19

## 2018-02-13 RX ADMIN — PANTOPRAZOLE SODIUM 40 MILLIGRAM(S): 20 TABLET, DELAYED RELEASE ORAL at 05:49

## 2018-02-13 NOTE — PROGRESS NOTE ADULT - SUBJECTIVE AND OBJECTIVE BOX
Patient seen and examined bedside resting comfortably.  No complaints offered.   Has Ortiz catheter    T(F): 97.5 (02-13-18 @ 05:00), Max: 98.7 (02-12-18 @ 11:57)  HR: 83 (02-13-18 @ 05:00) (75 - 90)  BP: 124/53 (02-13-18 @ 05:00) (124/53 - 135/71)  RR: 18 (02-13-18 @ 05:00) (17 - 18)  SpO2: 95% (02-13-18 @ 05:00) (95% - 100%)  Wt(kg): --  CAPILLARY BLOOD GLUCOSE      PHYSICAL EXAM:  General: NAD, alert and awake  HEENT: NCAT, EOMI, conjunctiva clear  Chest: nonlabored respirations, good inspiratory effort  Abdomen: soft, NTND.   : uncircumcised phallus, adequate meatus. Ortiz catheter in SITU with yellow urine, 700ml/24hrs    LABS:                        11.2   17.48 )-----------( 291      ( 13 Feb 2018 07:28 )             34.3   02-13    143  |  107  |  35<H>  ----------------------------<  46<L>  3.9   |  23  |  0.84    Ca    8.6      13 Feb 2018 07:28    TPro  6.2  /  Alb  2.0<L>  /  TBili  1.2  /  DBili  x   /  AST  18  /  ALT  9<L>  /  AlkPhos  110  02-12    I&O's Detail    12 Feb 2018 07:01  -  13 Feb 2018 07:00  --------------------------------------------------------  IN:    dextrose 5% with potassium chloride 20 mEq/L: 150 mL    Oral Fluid: 50 mL  Total IN: 200 mL    OUT:    Indwelling Catheter - Urethral: 700 mL  Total OUT: 700 mL    Total NET: -500 mL      13 Feb 2018 07:01  -  13 Feb 2018 10:47  --------------------------------------------------------  IN:    dextrose 5% with potassium chloride 20 mEq/L: 150 mL    Oral Fluid: 380 mL  Total IN: 530 mL    OUT:  Total OUT: 0 mL    Total NET: 530 mL        Impression: 89y Male with PMH Paroxysmal atrial fibrillation, Gout, Pneumonia, Elevated cholesterol, and HTN. Admitted with multifocal pneumonia and urinary retention requiring ortiz catheter, Now DNR, DNI    Plan:  - continue ortiz, May be discharged with ortiz  - continue flomax  - abx per medicine   - continue medical management   discussed with Dr Pena Patient seen and examined bedside resting comfortably.  No complaints offered.   Has Ortiz catheter    T(F): 97.5 (02-13-18 @ 05:00), Max: 98.7 (02-12-18 @ 11:57)  HR: 83 (02-13-18 @ 05:00) (75 - 90)  BP: 124/53 (02-13-18 @ 05:00) (124/53 - 135/71)  RR: 18 (02-13-18 @ 05:00) (17 - 18)  SpO2: 95% (02-13-18 @ 05:00) (95% - 100%)  Wt(kg): --  CAPILLARY BLOOD GLUCOSE      PHYSICAL EXAM:  General: NAD, alert and awake  HEENT: NCAT, EOMI, conjunctiva clear  Chest: nonlabored respirations, good inspiratory effort  Abdomen: soft, NTND.   : uncircumcised phallus, adequate meatus. Ortiz catheter in SITU with yellow urine, 700ml/24hrs    LABS:                        11.2   17.48 )-----------( 291      ( 13 Feb 2018 07:28 )             34.3   02-13    143  |  107  |  35<H>  ----------------------------<  46<L>  3.9   |  23  |  0.84    Ca    8.6      13 Feb 2018 07:28    TPro  6.2  /  Alb  2.0<L>  /  TBili  1.2  /  DBili  x   /  AST  18  /  ALT  9<L>  /  AlkPhos  110  02-12    I&O's Detail    12 Feb 2018 07:01  -  13 Feb 2018 07:00  --------------------------------------------------------  IN:    dextrose 5% with potassium chloride 20 mEq/L: 150 mL    Oral Fluid: 50 mL  Total IN: 200 mL    OUT:    Indwelling Catheter - Urethral: 700 mL  Total OUT: 700 mL    Total NET: -500 mL      13 Feb 2018 07:01  -  13 Feb 2018 10:47  --------------------------------------------------------  IN:    dextrose 5% with potassium chloride 20 mEq/L: 150 mL    Oral Fluid: 380 mL  Total IN: 530 mL    OUT:  Total OUT: 0 mL    Total NET: 530 mL        Impression: 89y Male with PMH Paroxysmal atrial fibrillation, Gout, Pneumonia, Elevated cholesterol, and HTN. Admitted with multifocal pneumonia and urinary retention requiring ortiz catheter, Now DNR, DNI    Plan:  - continue ortiz, May be discharged to LTC with ortiz  - continue flomax  - abx per medicine   - continue medical management   discussed with Dr Pena

## 2018-02-13 NOTE — PROGRESS NOTE ADULT - PROVIDER SPECIALTY LIST ADULT
Cardiology
Family Medicine
Infectious Disease
Palliative Care
Pulmonology
Urology
Family Medicine
Family Medicine
Infectious Disease

## 2018-02-16 DIAGNOSIS — A41.50 GRAM-NEGATIVE SEPSIS, UNSPECIFIED: ICD-10-CM

## 2018-02-16 DIAGNOSIS — R33.9 RETENTION OF URINE, UNSPECIFIED: ICD-10-CM
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
## 2018-02-16 DIAGNOSIS — F05 DELIRIUM DUE TO KNOWN PHYSIOLOGICAL CONDITION: ICD-10-CM

## 2018-02-16 DIAGNOSIS — Z98.41 CATARACT EXTRACTION STATUS, RIGHT EYE: ICD-10-CM

## 2018-02-16 DIAGNOSIS — E87.6 HYPOKALEMIA: ICD-10-CM

## 2018-02-16 DIAGNOSIS — R62.7 ADULT FAILURE TO THRIVE: ICD-10-CM

## 2018-02-16 DIAGNOSIS — Z51.5 ENCOUNTER FOR PALLIATIVE CARE: ICD-10-CM

## 2018-02-16 DIAGNOSIS — M10.9 GOUT, UNSPECIFIED: ICD-10-CM

## 2018-02-16 DIAGNOSIS — Z66 DO NOT RESUSCITATE: ICD-10-CM

## 2018-02-16 DIAGNOSIS — E78.00 PURE HYPERCHOLESTEROLEMIA, UNSPECIFIED: ICD-10-CM

## 2018-02-16 DIAGNOSIS — J10.00 INFLUENZA DUE TO OTHER IDENTIFIED INFLUENZA VIRUS WITH UNSPECIFIED TYPE OF PNEUMONIA: ICD-10-CM

## 2018-02-16 DIAGNOSIS — I48.0 PAROXYSMAL ATRIAL FIBRILLATION: ICD-10-CM

## 2018-02-16 DIAGNOSIS — I10 ESSENTIAL (PRIMARY) HYPERTENSION: ICD-10-CM

## 2018-02-16 DIAGNOSIS — E43 UNSPECIFIED SEVERE PROTEIN-CALORIE MALNUTRITION: ICD-10-CM

## 2018-02-16 DIAGNOSIS — J18.9 PNEUMONIA, UNSPECIFIED ORGANISM: ICD-10-CM

## 2018-02-16 DIAGNOSIS — H91.90 UNSPECIFIED HEARING LOSS, UNSPECIFIED EAR: ICD-10-CM

## 2018-02-16 DIAGNOSIS — R13.10 DYSPHAGIA, UNSPECIFIED: ICD-10-CM

## 2018-02-16 DIAGNOSIS — R31.0 GROSS HEMATURIA: ICD-10-CM

## 2019-03-05 NOTE — ED ADULT NURSE NOTE - NS TRANSFER PATIENT BELONGINGS
Contributing Nutrition Diagnosis  Altered Nutrition Related Lab Values    Related to (etiology):   Altered Absorption of nutrients and non-compliance with nutrition related recommendations    Signs and Symptoms (as evidenced by):   Elevated glucose    Interventions/Recommendations (treatment strategy):  Diabetic diet 3-4 carb servings at meals, Glucerna BID    Nutrition Diagnosis Status:   Continues     Clothing

## 2019-05-10 NOTE — PROGRESS NOTE BEHAVIORAL HEALTH - NSBHCONSULTOBS_PSY_A_CORE
Enhanced supervision
Laceration    A laceration is a cut that goes through all of the layers of the skin and into the tissue that is right under the skin. Some lacerations heal on their own. Others need to be closed with skin adhesive strips, skin glue, stitches (sutures), or staples. Proper laceration care minimizes the risk of infection and helps the laceration to heal better.  If non-absorbable stitches or staples have been placed, they must be taken out within the time frame instructed by your healthcare provider.    SEEK IMMEDIATE MEDICAL CARE IF YOU HAVE ANY OF THE FOLLOWING SYMPTOMS: swelling around the wound, worsening pain, drainage from the wound, red streaking going away from your wound, inability to move finger or toe near the laceration, or discoloration of skin near the laceration.
Enhanced supervision
Enhanced supervision

## 2020-01-24 NOTE — ED PROVIDER NOTE - PMH
Elevated cholesterol    Gout    HTN (hypertension)    Paroxysmal atrial fibrillation    Pneumonia Detail Level: Detailed

## 2021-08-06 NOTE — PHYSICAL THERAPY INITIAL EVALUATION ADULT - IMPAIRMENTS FOUND, PT EVAL
no aerobic capacity/endurance/integumentary integrity/ventilation and respiration/gas exchange/visual motor/cranial and peripheral nerve integrity/joint integrity and mobility/neuromotor development and sensory integration/poor safety awareness/gait, locomotion, and balance/ROM/muscle strength/sensory integrity

## 2021-08-07 NOTE — PROGRESS NOTE ADULT - PROBLEM SELECTOR PROBLEM 2
PSA mildly elevated  We will give him a course of antibiotics and reassess a total and free PSA in about a month  HTN (hypertension), benign Multifocal pneumonia

## 2021-12-09 NOTE — SWALLOW BEDSIDE ASSESSMENT ADULT - SWALLOW EVAL: THERAPY FREQUENCY
DISCONTINUE FOSAMAX  START PROLIA   LABS   WEIGHT BEARING EXERCISES   FALL PRECAUTIONS
as schedule permits

## 2022-01-06 NOTE — PROGRESS NOTE ADULT - ASSESSMENT
[FreeTextEntry1] : Pt here for follow up on blood pressure and diabetes, tolerates meds no headaches no  dizziness. 89 y/o white male with hx Paroxysmal A Fib, HTN, HLD, Gout, s/p recent [facility]  admission for Rt Multifocal PNA  from 12/18 - 12/22, s/p trip and fall at [facility] with subsequent Lt Scalp laceration requiring sutures, now admitted with fever, cough, SOB, and fatigue and found to have Influenza A Positive by RVP and new RLL PNA with trace effusion reported along with leukocytosis.    Con't with:    MEDICATIONS  (STANDING):  ALBUTerol/ipratropium for Nebulization 3 milliLiter(s) Nebulizer every 6 hours  aspirin enteric coated 81 milliGRAM(s) Oral daily  ATENolol  Tablet 50 milliGRAM(s) Oral daily  atorvastatin 20 milliGRAM(s) Oral at bedtime  azithromycin   Tablet 500 milliGRAM(s) Oral daily  buDESOnide   0.5 milliGRAM(s) Respule 0.5 milliGRAM(s) Inhalation every 12 hours  dextrose 5% + sodium chloride 0.9%. 1000 milliLiter(s) (75 mL/Hr) IV Continuous <Continuous>  lactobacillus acidophilus 1 Tablet(s) Oral every 12 hours  meropenem IVPB 1000 milliGRAM(s) IV Intermittent every 8 hours  oseltamivir 30 milliGRAM(s) Oral two times a day  pantoprazole  Injectable 40 milliGRAM(s) IV Push every 12 hours  vancomycin  IVPB 750 milliGRAM(s) IV Intermittent every 12 hours    Not a good AC candidate long-term.  Supportive care.    Brennan Davies MD, FACC

## 2022-02-11 NOTE — BEHAVIORAL HEALTH ASSESSMENT NOTE - NS ED [facility] [employer] MEDICAL RECORD REVIEWED CONSENT OBTAINED N DETAILS
Refill request received for Pravachol 40mg    Last office visit: 7/30/21  Next office visit: Visit date not found  Last refill: 7/16/21  Last labs: 7/20/21     Other

## 2022-09-06 NOTE — ED PROCEDURE NOTE - [facility] ED LOCAL ANESTHESIA1
ANTICOAGULATION MANAGEMENT     Nabor Cunningham 84 year old male is on warfarin with supratherapeutic INR result. (Goal INR 2.0-3.0)    Recent labs: (last 7 days)     09/06/22  1035   INR 3.1*       ASSESSMENT       Source(s): Chart Review and Home Care/Facility Nurse       Warfarin doses taken: Warfarin taken as instructed    Diet: No new diet changes identified    New illness, injury, or hospitalization: No    Medication/supplement changes: None noted    Signs or symptoms of bleeding or clotting: No    Previous INR: Therapeutic last visit; previously outside of goal range    Additional findings: None       PLAN     Recommended plan for no diet, medication or health factor changes affecting INR     Dosing Instructions: decrease your warfarin dose (6.7% change) with next INR in 1 week       Summary  As of 9/6/2022    Full warfarin instructions:  4 mg every day   Next INR check:  9/12/2022             Telephone call with [facility] home care nurse who agrees to plan and repeated back plan correctly    Orders given to  Homecare nurse/facility to recheck    Education provided: Please call back if any changes to your diet, medications or how you've been taking warfarin    Plan made per [facility] anticoagulation protocol    Aixa Webster, RN  Anticoagulation Clinic  9/6/2022    _______________________________________________________________________     Anticoagulation Episode Summary     Current INR goal:  2.0-3.0   TTR:  54.8 % (1 y)   Target end date:  Indefinite   Send INR reminders to:  [facility]    Indications    Chronic atrial fibrillation (H) [I48.20]  Long-term (current) use of anticoagulants [Z79.01] [Z79.01]           Comments:  Contact dwayne Matias with dosing information.         Anticoagulation Care Providers     Provider Role Specialty Phone number    Heron Mayo MD Referring Family Medicine [phone]          
Received message from home care the patient will be starting doxycycline today or tomorrow.    Left detailed message for [facility] patient should have INR rechecked on Friday 9/9/22 due to drug interaction.    MINA DomínguezN, RN  Anticoagulation Clinic        
1% lidocaine

## 2022-09-13 NOTE — DISCHARGE NOTE ADULT - MEDICATION SUMMARY - MEDICATIONS TO TAKE
60 I will START or STAY ON the medications listed below when I get home from the hospital:    aspirin 325 mg oral tablet  -- 1 tab(s) by mouth once a day  -- Indication: For Paroxysmal atrial fibrillation    allopurinol 100 mg oral tablet  -- 1 tab(s) by mouth 2 times a day  -- Indication: For gout    Lipitor 10 mg oral tablet  -- 1 tab(s) by mouth once a day  -- Indication: For Hyperlipidemia    atenolol 50 mg oral tablet  -- 1 tab(s) by mouth once a day  -- Indication: For Hypertension    hydroCHLOROthiazide 25 mg oral tablet  -- 1 tab(s) by mouth once a day  -- Indication: For Hypertension    amoxicillin-clavulanate 875 mg-125 mg oral tablet  -- 1 tab(s) by mouth 2 times a day  -- Indication: For Pneumonia

## 2024-09-30 NOTE — PROGRESS NOTE ADULT - SUBJECTIVE AND OBJECTIVE BOX
Patient presented for nurse visit/Blood draw. 22g needle X 1 attempt in left antecubital.  Patient tolerated procedure well.     APPEARS COMFORTABLE  STABLE CARDIOVASCULAR STATUS; CONTINUING WITH PRESENT MANAGEMENT.